# Patient Record
Sex: FEMALE | Race: WHITE | NOT HISPANIC OR LATINO | Employment: OTHER | URBAN - METROPOLITAN AREA
[De-identification: names, ages, dates, MRNs, and addresses within clinical notes are randomized per-mention and may not be internally consistent; named-entity substitution may affect disease eponyms.]

---

## 2017-07-07 ENCOUNTER — HOSPITAL ENCOUNTER (OUTPATIENT)
Dept: MAMMOGRAPHY | Facility: CLINIC | Age: 62
Discharge: HOME | End: 2017-07-07
Payer: COMMERCIAL

## 2017-07-07 DIAGNOSIS — Z12.31 ENCOUNTER FOR SCREENING MAMMOGRAM FOR MALIGNANT NEOPLASM OF BREAST: ICD-10-CM

## 2017-07-07 PROCEDURE — G0202 SCR MAMMO BI INCL CAD: HCPCS

## 2017-07-07 PROCEDURE — 77063 BREAST TOMOSYNTHESIS BI: CPT

## 2018-06-06 ENCOUNTER — ANNUAL EXAM (OUTPATIENT)
Dept: OBGYN CLINIC | Age: 63
End: 2018-06-06
Payer: MEDICARE

## 2018-06-06 VITALS
HEIGHT: 65 IN | DIASTOLIC BLOOD PRESSURE: 94 MMHG | SYSTOLIC BLOOD PRESSURE: 134 MMHG | WEIGHT: 168 LBS | BODY MASS INDEX: 27.99 KG/M2

## 2018-06-06 DIAGNOSIS — Z01.419 ENCOUNTER FOR GYNECOLOGICAL EXAMINATION WITHOUT ABNORMAL FINDING: Primary | ICD-10-CM

## 2018-06-06 DIAGNOSIS — Z78.0 POSTMENOPAUSAL: ICD-10-CM

## 2018-06-06 DIAGNOSIS — Z12.31 ENCOUNTER FOR SCREENING MAMMOGRAM FOR MALIGNANT NEOPLASM OF BREAST: ICD-10-CM

## 2018-06-06 DIAGNOSIS — Z82.62 FAMILY HISTORY OF DISUSE OSTEOPOROSIS: ICD-10-CM

## 2018-06-06 PROCEDURE — G0101 CA SCREEN;PELVIC/BREAST EXAM: HCPCS | Performed by: OBSTETRICS & GYNECOLOGY

## 2018-06-06 RX ORDER — CHLORAL HYDRATE 500 MG
1000 CAPSULE ORAL
COMMUNITY

## 2018-06-06 RX ORDER — FEXOFENADINE HYDROCHLORIDE 60 MG/1
TABLET, FILM COATED ORAL
COMMUNITY

## 2018-06-06 NOTE — PROGRESS NOTES
Assessment/Plan:    Encounter for gynecological examination without abnormal finding  PapHPV current, no longer indicated  Mammogram ordered  Colonoscopy current  DEXA ordered  Encourage healthy diet, exercise, Calcium 1200mg per day and at least 800 iu Vitamin D daily  Some pelvic floor weakness noted on exam  Discussed strengthening exercises  Diagnoses and all orders for this visit:    Encounter for gynecological examination without abnormal finding    Encounter for screening mammogram for malignant neoplasm of breast  -     Mammo screening bilateral w 3d & cad; Future    Postmenopausal  -     DXA bone density spine hip and pelvis; Future    Family history of disuse osteoporosis  -     DXA bone density spine hip and pelvis; Future    Other orders  -     Aspirin 81 MG EC tablet; Take by mouth  -     fexofenadine (ALLEGRA) 60 MG tablet; Take by mouth  -     Cyanocobalamin (VITAMIN B 12) 100 MCG LOZG; Take by mouth  -     NOVOLOG 100 UNIT/ML injection;   -     Insulin Infusion Pump KIT; by Does not apply route  -     Omega-3 Fatty Acids (FISH OIL) 1,000 mg; Take 1,000 mg by mouth          Subjective:      Patient ID: Faby Omer is a 70 y o  female  Patient here for yearly exam   Age of first period 14yrs old     lmp: patient is   Last pap: 9/14/15 neg HPV neg  Last mammo: 17 BR2 neg (3D)  Colonoscopy:  (due )  Patient is not a smoker  Patient is not a drinker  Patient tries to exercise  NOted some increase abdominal/pelvic pressure after this past weekend with her grandchildren  No changes in bowel or bladder  Exercises daily- cardio some weight training-upper body,legs      Gynecologic Exam   The patient's pertinent negatives include no genital itching, genital lesions, genital odor, genital rash, pelvic pain, vaginal bleeding or vaginal discharge  The patient is experiencing no pain  Associated symptoms include constipation   Pertinent negatives include no chills, diarrhea, fever, frequency, nausea, sore throat, urgency or vomiting  She is sexually active  She is postmenopausal        The following portions of the patient's history were reviewed and updated as appropriate:   She  has no past medical history on file  She   Patient Active Problem List    Diagnosis Date Noted    Encounter for gynecological examination without abnormal finding 06/06/2018    Type I diabetes mellitus, uncontrolled ([facility] ) 06/13/2012    DVT of upper extremity (deep vein thrombosis) ([facility] ) 04/03/2012     She  has no past surgical history on file  Her family history is not on file  She  reports that she has never smoked  She has never used smokeless tobacco  She reports that she does not drink alcohol or use drugs  Current Outpatient Prescriptions   Medication Sig Dispense Refill    Aspirin 81 MG EC tablet Take by mouth      Cyanocobalamin (VITAMIN B 12) 100 MCG LOZG Take by mouth      fexofenadine (ALLEGRA) 60 MG tablet Take by mouth      Insulin Infusion Pump KIT by Does not apply route      NOVOLOG 100 UNIT/ML injection       Omega-3 Fatty Acids (FISH OIL) 1,000 mg Take 1,000 mg by mouth       No current facility-administered medications for this visit  No current outpatient prescriptions on file prior to visit  No current facility-administered medications on file prior to visit  She is allergic to penicillins; sulfa antibiotics; and ampicillin       Review of Systems   Constitutional: Negative for activity change, appetite change, chills, fatigue and fever  HENT: Negative for rhinorrhea, sneezing and sore throat  Eyes: Negative for visual disturbance  Respiratory: Negative for cough, shortness of breath and wheezing  Cardiovascular: Negative for chest pain, palpitations and leg swelling  Gastrointestinal: Positive for constipation  Negative for abdominal distention, diarrhea, nausea and vomiting     Genitourinary: Negative for difficulty urinating, frequency, pelvic pain, urgency and vaginal discharge  Neurological: Negative for syncope and light-headedness  Objective:      /94 (BP Location: Right arm, Patient Position: Sitting, Cuff Size: Standard)   Ht 5' 4 8" (1 646 m)   Wt 76 2 kg (168 lb)   LMP  (LMP Unknown)   BMI 28 13 kg/m²          Physical Exam   Constitutional: She is oriented to person, place, and time  Genitourinary: Vagina normal and uterus normal  No breast swelling, tenderness, discharge or bleeding  There is no rash, tenderness, lesion or injury on the right labia  There is no rash, tenderness, lesion or injury on the left labia  Uterus is not deviated, not enlarged, not fixed and not tender  Cervix exhibits no motion tenderness, no discharge and no friability  Right adnexum displays no mass, no tenderness and no fullness  Left adnexum displays no mass, no tenderness and no fullness  No tenderness or bleeding in the vagina  No vaginal discharge found  Genitourinary Comments: Lax vaginal walls but mild prolapse  cystocele    Abdominal wall is firm from scar tissue from small bowel removal/fistula repair   Neurological: She is alert and oriented to person, place, and time

## 2018-06-06 NOTE — ASSESSMENT & PLAN NOTE
PapHPV current, no longer indicated  Mammogram ordered  Colonoscopy current  DEXA ordered  Encourage healthy diet, exercise, Calcium 1200mg per day and at least 800 iu Vitamin D daily  Some pelvic floor weakness noted on exam  Discussed strengthening exercises

## 2018-06-06 NOTE — PATIENT INSTRUCTIONS
Core Strengthening Exercises   WHAT YOU NEED TO KNOW:   What do I need to know about core strengthening exercises? Your core includes the muscles of your lower back, hip, pelvis, and abdomen  Core strengthening exercises help heal and strengthen these muscles  This helps prevent another injury, and keeps your pelvis, spine, and hips in the correct position  What do I need to know about exercise safety? Talk to your healthcare provider before you start an exercise program  A physical therapist can teach you how to do core strengthening exercises safely  · Do the exercises on a mat or firm surface  A firm surface will support your spine and avoid low back pain  Do not do these exercises on a bed  · Move slowly and smoothly  Avoid fast or jerky motions  · Stop if you feel pain  Core exercises should not feel painful  Stop if you feel pain  · Breathe normally during core exercises  Do not hold your breath  This may cause an increase in blood pressure and prevent muscle strengthening  Your healthcare provider will tell you when to inhale and exhale during the exercise  · Begin all of your exercises with abdominal bracing  Abdominal bracing helps warm up your core muscles  You can also practice abdominal bracing throughout the day while you are sitting or standing  Lie on your back with your knees bent and feet flat on the floor  Place your arms in a relaxed position beside your body  Tighten your abdominal muscles  Pull your belly button in and up toward your spine  Hold for 5 seconds  Relax your muscles  Repeat 10 times  How do I perform core strengthening exercises? Your healthcare provider will tell you how often to do these exercises  The provider will also tell you how many repetitions of each exercise you should do  Hold each exercise for 5 seconds or as directed  As you get stronger, increase your hold to 10 to 15 seconds   You can do some of these exercises on a stability ball, or with a weight  Ask your healthcare provider how to use a stability ball or weight for these exercises:  · Bent leg side bridge:  Lie on one side with your legs, hips, and shoulders in a straight line  Prop yourself up onto your forearm so your elbow is directly below your shoulder  Bend your knees to 90°  Lift your hips off the floor  Balance yourself on your forearm and the side of your knee  Hold this position  Repeat on the other side  · Straight leg side bridge:  Lie on one side with your legs, hips, and shoulders in a straight line  Prop yourself up onto your forearm so your elbow is directly below your shoulder  Your top leg can rest in front of your bottom leg for support  Lift your hips off the floor  Balance yourself on your forearm and the outside of your flexed foot  Do not let your ankle bend sideways  Hold this position  Repeat on the other side  · Superman:  Lie on your stomach  Extend your arms forward on the floor  Tighten your abdominal muscles and lift your right hand and left leg off the floor  Hold this position  Slowly return to the starting position  Tighten your abdominal muscles and lift your left hand and right leg off the floor  Hold this position  Slowly return to the starting position  · Curl up:  Lie on your back with your knees bent and feet flat on the floor  Place your hands, palms down, underneath your lower back  Next, with your elbows on the floor, lift your shoulders and chest 2 to 3 inches off the floor  Keep your head in line with your shoulders  Hold this position  Slowly return to the starting position  · Straight leg raises:  Lie on your back with one leg straight  Bend the other knee and place your foot flat on the floor  Tighten your abdominal muscles  Keep your leg straight and slowly lift it straight up 6 to 12 inches off the floor  Hold this position  Lower your leg slowly  Do as many repetitions as directed on this side   Repeat with the other leg  · Plank:  Lie on your stomach  Bend your elbows and place your forearms flat on the floor  Lift your chest, stomach, and knees off the floor  Make sure your elbows are below your shoulders  Your body should be in a straight line  Do not let your hips or lower back sink to the ground  Squeeze your abdominal muscles together and hold for 15 seconds  To make this exercise harder, hold for 30 seconds or lift 1 leg at a time  · Bicycles:  Lie on your back  Bend both knees and bring them toward your chest  Your calves should be parallel to the floor  Place the palms of your hands on the back of your head  Straighten your right leg and keep it lifted 2 inches off the floor  Raise your head and shoulders off the floor and twist towards your left  Keep your head and shoulders lifted  Bend your right knee while you straighten your left leg  Keep your left leg 2 inches off the floor  Twist your head and chest towards the left leg  Continue to straighten 1 leg at a time and twist        When should I contact my healthcare provider? · You have sharp or worsening pain during exercise or at rest     · You have questions or concerns about your condition, care, or exercise program   CARE AGREEMENT:   You have the right to help plan your care  Learn about your health condition and how it may be treated  Discuss treatment options with your caregivers to decide what care you want to receive  You always have the right to refuse treatment  The above information is an  only  It is not intended as medical advice for individual conditions or treatments  Talk to your doctor, nurse or pharmacist before following any medical regimen to see if it is safe and effective for you  © 2017 [employer] Information is for End User's use only and may not be sold, redistributed or otherwise used for commercial purposes   All illustrations and images included in CareNotes® are the copyrighted property of [employer]  or [employer]  Wellness Visit for Adults   WHAT YOU NEED TO KNOW:   What is a wellness visit? A wellness visit is when you see your healthcare provider to get screened for health problems  You can also get advice on how to stay healthy  Write down your questions so you remember to ask them  Ask your healthcare provider how often you should have a wellness visit  What happens at a wellness visit? Your healthcare provider will ask about your health, and your family history of health problems  This includes high blood pressure, heart disease, and cancer  He or she will ask if you have symptoms that concern you, if you smoke, and about your mood  You may also be asked about your intake of medicines, supplements, food, and alcohol  Any of the following may be done:  · Your weight  will be checked  Your height may also be checked so your body mass index (BMI) can be calculated  Your BMI shows if you are at a healthy weight  · Your blood pressure  and heart rate will be checked  Your temperature may also be checked  · Blood and urine tests  may be done  Blood tests may be done to check your cholesterol levels  Abnormal cholesterol levels increase your risk for heart disease and stroke  You may also need a blood or urine test to check for diabetes if you are at increased risk  Urine tests may be done to look for signs of an infection or kidney disease  · A physical exam  includes checking your heartbeat and lungs with a stethoscope  Your healthcare provider may also check your skin to look for sun damage  · Screening tests  may be recommended  A screening test is done to check for diseases that may not cause symptoms  The screening tests you may need depend on your age, gender, family history, and lifestyle habits  For example, colorectal screening may be recommended if you are 48years old or older  What screening tests do I need if I am a woman? · A Pap smear  is used to screen for cervical cancer  Pap smears are usually done every 3 to 5 years depending on your age  You may need them more often if you have had abnormal Pap smear test results in the past  Ask your healthcare provider how often you should have a Pap smear  · A mammogram  is an x-ray of your breasts to screen for breast cancer  Experts recommend mammograms every 2 years starting at age 48 years  You may need a mammogram at age 52 years or younger if you have an increased risk for breast cancer  Talk to your healthcare provider about when you should start having mammograms and how often you need them  What vaccines might I need? · Get an influenza vaccine  every year  The influenza vaccine protects you from the flu  Several types of viruses cause the flu  The viruses change over time, so new vaccines are made each year  · Get a tetanus-diphtheria (Td) booster vaccine  every 10 years  This vaccine protects you against tetanus and diphtheria  Tetanus is a severe infection that may cause painful muscle spasms and lockjaw  Diphtheria is a severe bacterial infection that causes a thick covering in the back of your mouth and throat  · Get a human papillomavirus (HPV) vaccine  if you are female and aged 23 to 32 or male 23 to 24 and never received it  This vaccine protects you from HPV infection  HPV is the most common infection spread by sexual contact  HPV may also cause vaginal, penile, and anal cancers  · Get a pneumococcal vaccine  if you are aged 72 years or older  The pneumococcal vaccine is an injection given to protect you from pneumococcal disease  Pneumococcal disease is an infection caused by pneumococcal bacteria  The infection may cause pneumonia, meningitis, or an ear infection  · Get a shingles vaccine  if you are aged 61 or older, even if you have had shingles before  The shingles vaccine is an injection to protect you from the varicella-zoster virus   This is the same virus that causes chickenpox  Shingles is a painful rash that develops in people who had chickenpox or have been exposed to the virus  How can I eat healthy? My Plate is a model for planning healthy meals  It shows the types and amounts of foods that should go on your plate  Fruits and vegetables make up about half of your plate, and grains and protein make up the other half  A serving of dairy is included on the side of your plate  The amount of calories and serving sizes you need depends on your age, gender, weight, and height  Examples of healthy foods are listed below:  · Eat a variety of vegetables  such as dark green, red, and orange vegetables  You can also include canned vegetables low in sodium (salt) and frozen vegetables without added butter or sauces  · Eat a variety of fresh fruits , canned fruit in 100% juice, frozen fruit, and dried fruit  · Include whole grains  At least half of the grains you eat should be whole grains  Examples include whole-wheat bread, wheat pasta, brown rice, and whole-grain cereals such as oatmeal     · Eat a variety of protein foods such as seafood (fish and shellfish), lean meat, and poultry without skin (turkey and chicken)  Examples of lean meats include pork leg, shoulder, or tenderloin, and beef round, sirloin, tenderloin, and extra lean ground beef  Other protein foods include eggs and egg substitutes, beans, peas, soy products, nuts, and seeds  · Choose low-fat dairy products such as skim or 1% milk or low-fat yogurt, cheese, and cottage cheese  · Limit unhealthy fats  such as butter, hard margarine, and shortening  How much exercise do I need? Exercise at least 30 minutes per day on most days of the week  Some examples of exercise include walking, biking, dancing, and swimming  You can also fit in more physical activity by taking the stairs instead of the elevator or parking farther away from stores   Include muscle strengthening activities 2 days each week  Regular exercise provides many health benefits  It helps you manage your weight, and decreases your risk for type 2 diabetes, heart disease, stroke, and high blood pressure  Exercise can also help improve your mood  Ask your healthcare provider about the best exercise plan for you  What are some general health and safety guidelines I should follow? · Do not smoke  Nicotine and other chemicals in cigarettes and cigars can cause lung damage  Ask your healthcare provider for information if you currently smoke and need help to quit  E-cigarettes or smokeless tobacco still contain nicotine  Talk to your healthcare provider before you use these products  · Limit alcohol  A drink of alcohol is 12 ounces of beer, 5 ounces of wine, or 1½ ounces of liquor  · Lose weight, if needed  Being overweight increases your risk of certain health conditions  These include heart disease, high blood pressure, type 2 diabetes, and certain types of cancer  · Protect your skin  Do not sunbathe or use tanning beds  Use sunscreen with a SPF 15 or higher  Apply sunscreen at least 15 minutes before you go outside  Reapply sunscreen every 2 hours  Wear protective clothing, hats, and sunglasses when you are outside  · Drive safely  Always wear your seatbelt  Make sure everyone in your car wears a seatbelt  A seatbelt can save your life if you are in an accident  Do not use your cell phone when you are driving  This could distract you and cause an accident  Pull over if you need to make a call or send a text message  · Practice safe sex  Use latex condoms if are sexually active and have more than one partner  Your healthcare provider may recommend screening tests for sexually transmitted infections (STIs)  · Wear helmets, lifejackets, and protective gear  Always wear a helmet when you ride a bike or motorcycle, go skiing, or play sports that could cause a head injury   Wear protective equipment when you play sports  Wear a lifejacket when you are on a boat or doing water sports  CARE AGREEMENT:   You have the right to help plan your care  Learn about your health condition and how it may be treated  Discuss treatment options with your caregivers to decide what care you want to receive  You always have the right to refuse treatment  The above information is an  only  It is not intended as medical advice for individual conditions or treatments  Talk to your doctor, nurse or pharmacist before following any medical regimen to see if it is safe and effective for you  © 2017 [employer] Information is for End User's use only and may not be sold, redistributed or otherwise used for commercial purposes  All illustrations and images included in CareNotes® are the copyrighted property of [employer]  or [employer]

## 2018-10-11 ENCOUNTER — HOSPITAL ENCOUNTER (OUTPATIENT)
Dept: MAMMOGRAPHY | Facility: CLINIC | Age: 63
Discharge: HOME | End: 2018-10-11
Payer: MEDICARE

## 2018-10-11 DIAGNOSIS — Z82.62 FAMILY HISTORY OF DISUSE OSTEOPOROSIS: ICD-10-CM

## 2018-10-11 DIAGNOSIS — Z78.0 POSTMENOPAUSAL: ICD-10-CM

## 2018-10-11 DIAGNOSIS — Z12.31 ENCOUNTER FOR SCREENING MAMMOGRAM FOR MALIGNANT NEOPLASM OF BREAST: ICD-10-CM

## 2018-10-11 PROCEDURE — 77067 SCR MAMMO BI INCL CAD: CPT

## 2018-10-11 PROCEDURE — 77063 BREAST TOMOSYNTHESIS BI: CPT

## 2018-10-11 PROCEDURE — 77080 DXA BONE DENSITY AXIAL: CPT

## 2018-10-15 ENCOUNTER — TELEPHONE (OUTPATIENT)
Dept: OBGYN CLINIC | Age: 63
End: 2018-10-15

## 2018-10-15 NOTE — TELEPHONE ENCOUNTER
Called kamran's home to advise of normal bone density results, also to encourage healthy diet, exercise, calcium 1200mg per day and at least 800 iu vitamin D daily  Unable to physically speak with patient so left detailed voicemail advising patient  Also advised if any further questions or concerns to give our office a call back  [place] office number provided

## 2018-10-15 NOTE — TELEPHONE ENCOUNTER
----- Message from Kasi Causey MD sent at 10/15/2018  3:58 PM EDT -----  Please call Natalio Mary - her DEXA shows normal bone density  Encourage healthy diet, exercise, Calcium 1200mg per day and at least 800 iu Vitamin D daily

## 2019-12-06 ENCOUNTER — TRANSCRIBE ORDERS (OUTPATIENT)
Dept: ADMINISTRATIVE | Facility: HOSPITAL | Age: 64
End: 2019-12-06

## 2019-12-06 DIAGNOSIS — Z12.31 SCREENING MAMMOGRAM FOR HIGH-RISK PATIENT: Primary | ICD-10-CM

## 2019-12-12 ENCOUNTER — HOSPITAL ENCOUNTER (OUTPATIENT)
Dept: MAMMOGRAPHY | Facility: CLINIC | Age: 64
Discharge: HOME | End: 2019-12-12
Payer: MEDICARE

## 2019-12-12 VITALS — WEIGHT: 168 LBS | BODY MASS INDEX: 27.99 KG/M2 | HEIGHT: 65 IN

## 2019-12-12 DIAGNOSIS — Z12.31 SCREENING MAMMOGRAM FOR HIGH-RISK PATIENT: ICD-10-CM

## 2019-12-12 PROCEDURE — 77063 BREAST TOMOSYNTHESIS BI: CPT

## 2019-12-12 PROCEDURE — 77067 SCR MAMMO BI INCL CAD: CPT

## 2019-12-19 ENCOUNTER — HOSPITAL ENCOUNTER (OUTPATIENT)
Dept: ULTRASOUND IMAGING | Facility: CLINIC | Age: 64
Discharge: HOME | End: 2019-12-19
Payer: MEDICARE

## 2019-12-19 ENCOUNTER — HOSPITAL ENCOUNTER (OUTPATIENT)
Dept: MAMMOGRAPHY | Facility: CLINIC | Age: 64
Discharge: HOME | End: 2019-12-19
Payer: MEDICARE

## 2019-12-19 DIAGNOSIS — R92.8 ABNORMAL MAMMOGRAM: ICD-10-CM

## 2019-12-19 PROCEDURE — 77065 DX MAMMO INCL CAD UNI: CPT

## 2019-12-19 PROCEDURE — 76642 ULTRASOUND BREAST LIMITED: CPT

## 2019-12-19 PROCEDURE — G0279 TOMOSYNTHESIS, MAMMO: HCPCS

## 2019-12-19 RX ORDER — DOCUSATE SODIUM 100 MG/1
100 CAPSULE, LIQUID FILLED ORAL 2 TIMES DAILY
COMMUNITY
End: 2020-10-28 | Stop reason: ALTCHOICE

## 2019-12-19 NOTE — PROGRESS NOTES
Met with patient and Dr Reynoso Guardian                regarding recommendation for;      _____ RIGHT __x(2)____LEFT      __x___Ultrasound guided  ______Stereotactic  Breast biopsy  __x___Verbalized understanding        Blood thinners:  _____yes __x___no    Date stopped: ____n/a_______    Biopsy teaching sheet given:  ____x___yes ______no

## 2019-12-23 ENCOUNTER — HOSPITAL ENCOUNTER (OUTPATIENT)
Dept: MAMMOGRAPHY | Facility: CLINIC | Age: 64
Discharge: HOME | End: 2019-12-23

## 2019-12-23 ENCOUNTER — HOSPITAL ENCOUNTER (OUTPATIENT)
Dept: ULTRASOUND IMAGING | Facility: CLINIC | Age: 64
Discharge: HOME | End: 2019-12-23
Admitting: RADIOLOGY
Payer: MEDICARE

## 2019-12-23 VITALS — SYSTOLIC BLOOD PRESSURE: 168 MMHG | DIASTOLIC BLOOD PRESSURE: 87 MMHG | HEART RATE: 70 BPM

## 2019-12-23 DIAGNOSIS — R92.8 ABNORMAL MAMMOGRAM: ICD-10-CM

## 2019-12-23 DIAGNOSIS — R92.8 ABNORMAL ULTRASOUND OF BREAST: ICD-10-CM

## 2019-12-23 PROCEDURE — 88361 TUMOR IMMUNOHISTOCHEM/COMPUT: CPT | Performed by: PATHOLOGY

## 2019-12-23 PROCEDURE — 19083 BX BREAST 1ST LESION US IMAG: CPT

## 2019-12-23 PROCEDURE — 88341 IMHCHEM/IMCYTCHM EA ADD ANTB: CPT | Performed by: PATHOLOGY

## 2019-12-23 PROCEDURE — 88305 TISSUE EXAM BY PATHOLOGIST: CPT | Performed by: PATHOLOGY

## 2019-12-23 PROCEDURE — 88342 IMHCHEM/IMCYTCHM 1ST ANTB: CPT | Performed by: PATHOLOGY

## 2019-12-23 RX ORDER — LIDOCAINE HYDROCHLORIDE 10 MG/ML
5 INJECTION, SOLUTION INFILTRATION; PERINEURAL ONCE
Status: COMPLETED | OUTPATIENT
Start: 2019-12-23 | End: 2019-12-23

## 2019-12-23 RX ADMIN — LIDOCAINE HYDROCHLORIDE 5 ML: 10 INJECTION, SOLUTION INFILTRATION; PERINEURAL at 09:24

## 2019-12-23 RX ADMIN — LIDOCAINE HYDROCHLORIDE 5 ML: 10 INJECTION, SOLUTION INFILTRATION; PERINEURAL at 09:25

## 2019-12-23 NOTE — PROGRESS NOTES
Procedure type:    ___x__ultrasound guided _____stereotactic    Breast:    __x___Left _____Right    Location: 2 o'clock 10 CMFn    Needle: 12 Gauge jimena    # of passes: 5    Clip: Tophat    Performed by: Dr Ashly Suazo held for 5 minutes by: Amira Engel Strips:    ___x__yes _____no    Band aid:    __x___yes_____no    Tape and guaze:    ___x__yes _____no    Tolerated procedure:    ___x__yes _____no

## 2019-12-23 NOTE — PROGRESS NOTES
Ice pack given:    ___x__yes _____no    Discharge instructions signed by patient:    ___x__yes _____no    Discharge instructions given to patient:    __x___yes _____no    Discharged via:    __x___amulatory    _____wheelchair    _____stretcher    Stable on discharge:    ___x__yes ____no  Patient would like results over the phone

## 2019-12-23 NOTE — DISCHARGE INSTR - OTHER ORDERS
POST LARGE CORE BREAST BIOPSY PATIENT INFORMATION      1  Place an ice pack inside your bra over the top of the dressing every hour for 20 minutes (20 minutes on, 60 minutes off)  Do this until bedtime  2  Do not shower or bathe until the following morning  3  You may bathe your breast carefully with the steri-strips in place  Be careful    Not to loosen them  The steri-strips will fall off in 3-5 days  4  You may have mild discomfort, and you may have some bruising where the   Needle entered the skin  This should clear within 5-7 days  5  If you need medicine for discomfort, take acetaminophen products such as   Tylenol  You may also take Advil or Motrin products  6  Do not participate in strenuous activities such as-tennis, aerobics, skiing,  Weight lifting, etc  for 24 hours  Refrain from swimming/soaking for 72 hours  7  Wearing a bra for sleeping may be more comfortable for the first 24-48 hours  8  Watch for continued bleeding, pain or fever over 101; please call with any questions or concerns  For procedures done at the [facility] call:  Karlene Nageotte RN at [phone]  Floyd Mustafa RN at [phone]                    *After 4 PM call the Interventional Radiology Department                    [phone] and ask to speak with the nurse on call  For procedures done at the [address] [place] [address] call:         Venice James RN at   *After 4 PM call the Interventional Radiology Department   [phone] and ask to speak with the nurse on call  For procedures done at [address] [place] [address] call: The Radiology Nurse at [phone]  *After 4 PM call your physician, or go to the Emergency Department  9          The final results of your biopsy are usually available within one week

## 2019-12-24 NOTE — PROGRESS NOTES
Post procedure call completed    Bleeding: _____yes __X___no    Pain: _____yes ___X___no (Pt denies breast pain, pt states she is sore in shoulder and ribs from positioning, used ice and took ibuprofen)    Redness/Swelling: ______yes ___X___no (Pt with sm amt of bruising)    Band aid removed: __X___yes _____no    Steri-Strips intact: ___X___yes _____no    Pt with no questions at this time, adv will call when results available, adv to call with any questions or concerns, has name/# for contact

## 2019-12-26 ENCOUNTER — TELEPHONE (OUTPATIENT)
Dept: MAMMOGRAPHY | Facility: CLINIC | Age: 64
End: 2019-12-26

## 2019-12-26 ENCOUNTER — TELEPHONE (OUTPATIENT)
Dept: HEMATOLOGY ONCOLOGY | Facility: CLINIC | Age: 64
End: 2019-12-26

## 2019-12-26 NOTE — TELEPHONE ENCOUNTER
Call made to patient, adv appt made with Dr Paradise Hoffmann's office on Thursday January 16th at 56, arrive by [phone], directions given to office, adv new patient paperwork would be mailed to her for completion prior to arrival, pt with no questions at this time, pt has name/# for contact, adv to contact me anytime for questions/concerns/help

## 2019-12-26 NOTE — TELEPHONE ENCOUNTER
New Patient Encounter    New Patient Intake Form   Patient Details:  Kathleen Jules  1955  [id number]    Background Information:  [address] starts by opening a telephone encounter and gathering the following information   Who is calling to schedule? If not self, relationship to patient? Christine   Referring Provider RBC   What is the diagnosis? Left invasive ductal carcinoma   Is there any prior history of Cancer? No   If yes, please explain n/a   When was the diagnosis? 12/2019   Is patient aware of diagnosis? Yes   Reason for visit? NP DX   Have you had any testing done? If so: when, where? Yes   Are records in [place]? yes   Was the patient told to bring a disk? no   Scheduling Information:   Preferred [place]: [facility]     Requesting Specific Provider? Dr Coco Carlson   Are there any dates/time the patient cannot be seen? Would like an appt between 10am and 1pm      Miscellaneous:    After completing the above information, please route to Financial Counselor and the appropriate Nurse Navigator for review

## 2019-12-26 NOTE — TELEPHONE ENCOUNTER
Call placed to patient after pt given biopsy results from radiologist, Dr Waqas Harris, questions answered, adv next step is to set patient up with surgeon, options discussed and patient wanted to have appt made with Dr Cristiane Carlson, determined pt availability (would like appointment between 10a-1p at the [facility]) and adv pt I would make appt for her and call her back

## 2020-01-14 PROBLEM — Z17.0 MALIGNANT NEOPLASM OF UPPER-OUTER QUADRANT OF LEFT BREAST IN FEMALE, ESTROGEN RECEPTOR POSITIVE (HCC): Status: ACTIVE | Noted: 2020-01-14

## 2020-01-14 PROBLEM — C50.412 MALIGNANT NEOPLASM OF UPPER-OUTER QUADRANT OF LEFT BREAST IN FEMALE, ESTROGEN RECEPTOR POSITIVE (HCC): Status: ACTIVE | Noted: 2020-01-14

## 2020-01-15 NOTE — TELEPHONE ENCOUNTER
Pt has active medicare part A & B effective 01/01/18  Called [employer] & spoke to Carlos Zarate call ref# [id number]  This is an active supplemental plan that is effective 01/01/18  This plan follows medicare guidelines  If medicare pays they pay  There will be no pt responsibility   No need for me to call the pt

## 2020-01-16 ENCOUNTER — DOCUMENTATION (OUTPATIENT)
Dept: HEMATOLOGY ONCOLOGY | Facility: CLINIC | Age: 65
End: 2020-01-16

## 2020-01-16 ENCOUNTER — TELEPHONE (OUTPATIENT)
Dept: SURGICAL ONCOLOGY | Facility: CLINIC | Age: 65
End: 2020-01-16

## 2020-01-16 ENCOUNTER — CONSULT (OUTPATIENT)
Dept: SURGICAL ONCOLOGY | Facility: CLINIC | Age: 65
End: 2020-01-16
Payer: MEDICARE

## 2020-01-16 VITALS
BODY MASS INDEX: 28.49 KG/M2 | HEIGHT: 65 IN | WEIGHT: 171 LBS | DIASTOLIC BLOOD PRESSURE: 104 MMHG | SYSTOLIC BLOOD PRESSURE: 204 MMHG | HEART RATE: 80 BPM | TEMPERATURE: 98.1 F | RESPIRATION RATE: 16 BRPM

## 2020-01-16 DIAGNOSIS — Z17.0 MALIGNANT NEOPLASM OF UPPER-OUTER QUADRANT OF LEFT BREAST IN FEMALE, ESTROGEN RECEPTOR POSITIVE (HCC): Primary | ICD-10-CM

## 2020-01-16 DIAGNOSIS — Z80.3 FAMILY HISTORY OF BREAST CANCER: ICD-10-CM

## 2020-01-16 DIAGNOSIS — C50.412 MALIGNANT NEOPLASM OF UPPER-OUTER QUADRANT OF LEFT BREAST IN FEMALE, ESTROGEN RECEPTOR POSITIVE (HCC): Primary | ICD-10-CM

## 2020-01-16 PROCEDURE — 99205 OFFICE O/P NEW HI 60 MIN: CPT | Performed by: SURGERY

## 2020-01-16 NOTE — PROGRESS NOTES
BCN Navigator:  Met with patient and her spouse after MD visit  Introduced myself, & explained role of nurse navigator  Offered therapeutic listening & emotional support  Answered all questions to their satisfaction  Informed and provided written material pertaining to cancer support services available, including: Cancer counselors, LAFF program, P/T, Nutrition and treatment classes, Nick Archer and My Chart  Patient was given the patient guidebook and my contact information  I encouraged her to call for questions/concerns  Patient escorted to surgery scheduler  Will follow up as needed

## 2020-01-16 NOTE — PROGRESS NOTES
Surgical Oncology Consult Note       [address] [facility]  [facility] [place]  [address] [facility] [address]  [place] [address]    Pagan Japanese  1955  [id number]  [address]  [facility] [place]  [address] [facility] [place] [address]      Chief Complaint:     Chief Complaint   Patient presents with    Consult    Breast Cancer     New Diagnosis       Assessment and Plan:   Assessment/Plan   The patient presents with a new diagnosis of left breast invasive carcinoma  There may be a 2nd site  I have reviewed this with Dr Vincent Franz  We have recommended an MRI  The patient also has a family history of breast cancer and we recommended genetic testing which will try to coordinate today  I will see her back following these studies  Oncology History:        Malignant neoplasm of upper-outer quadrant of left breast in female, estrogen receptor positive ([facility] [address] )    12/23/2019 Biopsy     Left breast ultrasound-guided biopsy  2 o'clock, 10 cm from nipple  Invasive mammary carcinoma of no special type  Grade 1    OK 90  HER2 1+    Concordant  Appears unifocal - difficulty assessing accurate size on mammo vs US  2 1 cm vs 1 1 cm; possible concern for adjacent mass on original US not able to be visualized on biopsy  Left axilla US negative  Right breast clear  History of Present Illness: This is a 49-year-old woman who went for a screening mammogram which demonstrated stable calcifications on the left  There are no abnormalities on the right side  There is a new mass at the 2 o'clock position 10 cm from the nipple  The patient subsequent had diagnostic mammogram and ultrasound which suggested that there was a 2nd lesion approximately a cm and half away  Both of these were recommended to be biopsied however at the time of biopsy only the initial lesion could be seen and it was biopsied and anti Pat clip was placed    The pathology returned invasive ductal carcinoma grade 1 %, AZ 90% HER2 1+/negative  Evaluation of the axilla with ultrasound showed no evidence of adenopathy  The patient does have a family history of her mother having breast cancer in her 45s as well as a sister in her 62s  The patient presents now for an opinion regarding further management  Review of Systems:   Review of Systems   All other systems reviewed and are negative  Past Medical History:      Patient Active Problem List   Diagnosis    DVT of upper extremity (deep vein thrombosis) (HCC)    Type I diabetes mellitus, uncontrolled ([facility] [address] )    Encounter for gynecological examination without abnormal finding    Malignant neoplasm of upper-outer quadrant of left breast in female, estrogen receptor positive ([facility] [address] )      History reviewed  No pertinent past medical history       Past Surgical History:   Procedure Laterality Date    BREAST BIOPSY Left 2009    neg    US GUIDED BREAST BIOPSY LEFT COMPLETE Left 12/23/2019        Family History   Problem Relation Age of Onset    Breast cancer Mother 43    Cervical cancer Mother 58    Breast cancer Sister 61    No Known Problems Maternal Grandmother     No Known Problems Paternal Grandmother     No Known Problems Maternal Aunt     Bone cancer Paternal Aunt 43    No Known Problems Paternal Aunt     Cancer Father         Bladder        Social History     Socioeconomic History    Marital status: /Civil Union     Spouse name: Not on file    Number of children: Not on file    Years of education: Not on file    Highest education level: Not on file   Occupational History    Not on file   Social Needs    Financial resource strain: Not on file    Food insecurity:     Worry: Not on file     Inability: Not on file    Transportation needs:     Medical: Not on file     Non-medical: Not on file   Tobacco Use    Smoking status: Never Smoker    Smokeless tobacco: Never Used   Substance and Sexual Activity    Alcohol use: No    Drug use: No    Sexual activity: Not Currently     Birth control/protection: Post-menopausal   Lifestyle    Physical activity:     Days per week: Not on file     Minutes per session: Not on file    Stress: Not on file   Relationships    Social connections:     Talks on phone: Not on file     Gets together: Not on file     Attends [employer] service: Not on file     Active member of club or organization: Not on file     Attends meetings of clubs or organizations: Not on file     Relationship status: Not on file    Intimate partner violence:     Fear of current or ex partner: Not on file     Emotionally abused: Not on file     Physically abused: Not on file     Forced sexual activity: Not on file   Other Topics Concern    Not on file   Social History Narrative    Not on file        Current Outpatient Medications:     Aspirin 81 MG EC tablet, Take by mouth, Disp: , Rfl:     Cholecalciferol (VITAMIN D3 PO), Take by mouth, Disp: , Rfl:     Cyanocobalamin (VITAMIN B 12) 100 MCG LOZG, Take by mouth, Disp: , Rfl:     docusate sodium (COLACE) 100 mg capsule, Take 100 mg by mouth 2 (two) times a day, Disp: , Rfl:     fexofenadine (ALLEGRA) 60 MG tablet, Take by mouth, Disp: , Rfl:     hydrocortisone 0 5 % cream, Apply topically 2 (two) times a day, Disp: , Rfl:     Insulin Infusion Pump KIT, by Does not apply route, Disp: , Rfl:     LOSARTAN POTASSIUM PO, Take 50 mg by mouth, Disp: , Rfl:     NOVOLOG 100 UNIT/ML injection, , Disp: , Rfl:     Omega-3 Fatty Acids (FISH OIL) 1,000 mg, Take 1,000 mg by mouth, Disp: , Rfl:     Specialty Vitamins Products (MARTI-RX DIABETIC VITAMIN PO), Take by mouth daily, Disp: , Rfl:      Allergies   Allergen Reactions    Penicillins Hives    Sulfa Antibiotics     Ampicillin Rash       Physical Exam:     Vitals:    01/16/20 1249   BP: (!) 204/104   Pulse: 80   Resp: 16   Temp: 98 1 °F (36 7 °C)     Physical Exam   Constitutional: She is oriented to person, place, and time  She appears well-developed and well-nourished  HENT:   Head: Normocephalic and atraumatic  Mouth/Throat: Oropharynx is clear and moist    Eyes: Pupils are equal, round, and reactive to light  EOM are normal    Neck: Normal range of motion  Neck supple  No JVD present  No tracheal deviation present  No thyromegaly present  Cardiovascular: Normal rate, regular rhythm, normal heart sounds and intact distal pulses  Exam reveals no gallop and no friction rub  No murmur heard  Pulmonary/Chest: Effort normal and breath sounds normal  No respiratory distress  She has no wheezes  She has no rales  Examination of the right breast in both the sitting and supine position demonstrate no skin changes nipple discharge dominant masses or axillary adenopathy  Examination of the left breast demonstrates a well-healed prior incision in the upper inner breast   At the 2 to 3 o'clock position is area of ecchymosis  There is a small mass consistent with a hematoma  I do not appreciate any adenopath  There is no nipple discharge or worrisome skin findings  Abdominal: Soft  She exhibits no distension and no mass  There is no hepatomegaly  There is no tenderness  There is no rebound and no guarding  The patient has a draining fistula track cover with clean gauze as outlined on the diagram    Musculoskeletal: Normal range of motion  She exhibits no edema or tenderness  Lymphadenopathy:     She has no cervical adenopathy  Neurological: She is alert and oriented to person, place, and time  No cranial nerve deficit  Skin: Skin is warm and dry  No rash noted  No erythema  Psychiatric: She has a normal mood and affect  Her behavior is normal    Vitals reviewed  Results:   I reviewed her mammogram and ultrasound with Dr Haleigh Sanchez  He is concerned that there may be a 2nd malignancy after reviewing the films  He has recommended an MRI I agree with this approach    This was conveyed to the patient  Patient has a Kim filter in place her card states that is made out of titanium and is MRI safe  Discussion/Summary:   Clinically this is a stage I left breast cancer however it may be multifocal   We have coordinated an MRI  I have also recommended genetic testing  We will see her back following these studies  The a 2nd area identified which may be malignant could most likely be encompassed with a single lumpectomy  This was conveyed to the patient  Advance Care Planning/Advance Directives:  I discussed the disease status, treatment plans and follow-up with the patient

## 2020-01-17 ENCOUNTER — TELEPHONE (OUTPATIENT)
Dept: GYNECOLOGIC ONCOLOGY | Facility: CLINIC | Age: 65
End: 2020-01-17

## 2020-01-17 NOTE — TELEPHONE ENCOUNTER
Pre-Test Genetic Counseling Consult Note    Patient Name: Sushma Baker   /Age: 1955/65 y o  Referring Provider: Renetta Han MD, PhD, FACS    Date of Service: 2020  Genetic Counselor: Cornell Metzger [facility]    Interpretation Services: None    Service: Type: Telephone consult   Length of Visit: 30 minutes      Светлана Gibbons was referred to the [address] [place] [address] and Genetic Assessment Program due to her personal and family history of breast cancer  Cancer and Treatment History:    Malignant neoplasm of upper-outer quadrant of left breast in female, estrogen receptor positive ([facility] [address] )     2019 Biopsy- Left breast ultrasound-guided biopsy  2 o'clock, 10 cm from nipple  Invasive mammary carcinoma of no special type  Grade 1  % Positive   AZ 90% Positive   HER2 1+   Appears unifocal - difficulty assessing accurate size on mammo vs US  2 1 cm vs 1 1 cm; possible concern for adjacent mass on original US not able to be visualized on biopsy  Left axilla US negative  Right breast clear  Appointment with Dr Rachel Centeno on 2020    Medical and Surgical History  Pertinent surgical history:   Past Surgical History:   Procedure Laterality Date    BREAST BIOPSY Left     neg    US GUIDED BREAST BIOPSY LEFT COMPLETE Left 2019      Pertinent medical history:No past medical history on file        Other History:  Height:   Ht Readings from Last 1 Encounters:   20 5' 4 5" (1 638 m)     Weight:   Wt Readings from Last 1 Encounters:   20 77 6 kg (171 lb)      Relevant Family History:  Ancestry: Mexican/Yemeni (Non-Orthodoxy) (maternal & paternal)    Please refer to the scanned pedigree in the Media Tab for a full pedigree    Relevant History:  -Mother breast cancer age 39  -Sister breast cancer 63's  -Maternal first cousin (male) prostate cancer 52's    -Father bladder cancer 63's  -Paternal aunt bone cancer  -Paternal fist-cousin once removed (male)  from melanoma in his 30's    *All history is reported as provided by the patient; records are not available for review, except where indicated  Assessment:    Meets NCCN V1 2020 Testing Criteria for High-Penetrance Breast and/or Ovarian Caner Susceptibility genes   (This often includes BRCA1, BRCA2, CDH1, PALB2, PTEN and TP53 among others )   1  Breast cancer with at least one of the following:   o Diagnosed at any age with:  o ?1 close blood relative with breast cancer at age ? Xiomara Damon meets NCCN testing criteria as she was diagnosed with breast cancer (any age) and has a mother (first-degree relative) diagnosed with breast cancer under the age of 48  Genetic testing for hereditary cancer is available via single gene analysis or panel testing of multiple genes associated with an increased risk of cancer  Since [employer] personal and family history is most concerning for hereditary breast cancer and there are no other cancers such as colon, uterine or ovarian cancer in her family history, she decided to focus on genes associated with hereditary breast cancer  The risks, benefits, and limitations of genetic testing were reviewed with the patient, as well as genetic discrimination laws, and possible test results (positive, negative, variants of uncertain significance) and their clinical implications  Ellie Erwin decided to proceed with testing and provided consent  Plan:     Summary: Genetic testing is indicated for Ellie Erwin based on meeting NCCN testing criteria  Sample Collection: Ellie Erwin was mailed an [facility] Blood Collection Kit and requisition form overnight to her home  We encouraged her to go to her local [facility] for a blood drawn as soon as possible  Genetic Testing Preformed:    1  [facility] Breast Cancer STAT Panel (9 genes): ALEKSANDAR, BRCA1, BRCA2, CDH1, CHEK2, PALB2, PTEN, STK11, TP53    2   [facility] Breast Cancer Panel (16 genes): ALEKSANDAR, BARD1, BRCA1, BRCA2, BRIP1, CDH1, CHEK2, NBN, NF1, PALB2, PTEN, RAD50, RAD51C, RAD51D, STK11, TP53    Genetic Testing Lab: [facility]    Results take approximately 2-3 weeks to complete once test is started  We will contact Dayana Kirk once results are available  Additional recommendations for surveillance/medical management will be made pending genetic test results

## 2020-01-19 ENCOUNTER — HOSPITAL ENCOUNTER (OUTPATIENT)
Dept: RADIOLOGY | Facility: HOSPITAL | Age: 65
Discharge: HOME | End: 2020-01-19
Attending: SURGERY
Payer: MEDICARE

## 2020-01-19 VITALS — BODY MASS INDEX: 28.16 KG/M2 | WEIGHT: 169 LBS | HEIGHT: 65 IN

## 2020-01-19 DIAGNOSIS — Z17.0 MALIGNANT NEOPLASM OF UPPER-OUTER QUADRANT OF LEFT BREAST IN FEMALE, ESTROGEN RECEPTOR POSITIVE (HCC): ICD-10-CM

## 2020-01-19 DIAGNOSIS — C50.412 MALIGNANT NEOPLASM OF UPPER-OUTER QUADRANT OF LEFT BREAST IN FEMALE, ESTROGEN RECEPTOR POSITIVE (HCC): ICD-10-CM

## 2020-01-19 PROCEDURE — A9585 GADOBUTROL INJECTION: HCPCS | Performed by: SURGERY

## 2020-01-19 PROCEDURE — C8937 CAD BREAST MRI: HCPCS

## 2020-01-19 PROCEDURE — C8908 MRI W/O FOL W/CONT, BREAST,: HCPCS

## 2020-01-19 RX ADMIN — GADOBUTROL 7 ML: 604.72 INJECTION INTRAVENOUS at 14:44

## 2020-01-20 ENCOUNTER — APPOINTMENT (OUTPATIENT)
Dept: LAB | Facility: CLINIC | Age: 65
End: 2020-01-20
Payer: MEDICARE

## 2020-01-20 ENCOUNTER — TRANSCRIBE ORDERS (OUTPATIENT)
Dept: ADMINISTRATIVE | Facility: HOSPITAL | Age: 65
End: 2020-01-20

## 2020-01-20 DIAGNOSIS — Z80.3 FAMILY HISTORY OF MALIGNANT NEOPLASM OF BREAST: Primary | ICD-10-CM

## 2020-01-20 DIAGNOSIS — C50.412 MALIGNANT NEOPLASM OF UPPER-OUTER QUADRANT OF LEFT FEMALE BREAST, UNSPECIFIED ESTROGEN RECEPTOR STATUS (HCC): ICD-10-CM

## 2020-01-20 DIAGNOSIS — Z80.3 FAMILY HISTORY OF MALIGNANT NEOPLASM OF BREAST: ICD-10-CM

## 2020-01-20 DIAGNOSIS — Z17.0 ESTROGEN RECEPTOR POSITIVE: ICD-10-CM

## 2020-01-20 PROCEDURE — 36415 COLL VENOUS BLD VENIPUNCTURE: CPT

## 2020-01-22 LAB — MISCELLANEOUS LAB TEST RESULT: NORMAL

## 2020-01-23 ENCOUNTER — TELEPHONE (OUTPATIENT)
Dept: SURGICAL ONCOLOGY | Facility: CLINIC | Age: 65
End: 2020-01-23

## 2020-01-23 NOTE — TELEPHONE ENCOUNTER
Called patient to review breast MRI results  There was no answer; message left with call back number provided  Patient returned the phone call  Explained that the breast MRI did not show any new worrisome findings  Informed patient that Dr James Cordoba reviewed her MRI and based on the report and images, would take a little bit larger piece during her surgery  Patient verbalized understanding and was appreciative of the phone call  Denies any questions at this time

## 2020-01-27 ENCOUNTER — TELEPHONE (OUTPATIENT)
Dept: SURGICAL ONCOLOGY | Facility: CLINIC | Age: 65
End: 2020-01-27

## 2020-01-27 NOTE — TELEPHONE ENCOUNTER
Called patient regarding her genetic testing results, which were negative  Patient verbalized understanding and was appreciative of the phone call  Denies any questions at this time  Informed patient that a paper copy of her results would be given at her appointment  Pre-op appointment with Dr Kimberly Solis verified for 1/30/2020

## 2020-01-30 ENCOUNTER — APPOINTMENT (OUTPATIENT)
Dept: LAB | Facility: CLINIC | Age: 65
End: 2020-01-30
Payer: MEDICARE

## 2020-01-30 ENCOUNTER — HOSPITAL ENCOUNTER (OUTPATIENT)
Dept: RADIOLOGY | Facility: HOSPITAL | Age: 65
Discharge: HOME | End: 2020-01-30
Attending: SURGERY
Payer: MEDICARE

## 2020-01-30 ENCOUNTER — OFFICE VISIT (OUTPATIENT)
Dept: SURGICAL ONCOLOGY | Facility: CLINIC | Age: 65
End: 2020-01-30
Payer: MEDICARE

## 2020-01-30 VITALS
DIASTOLIC BLOOD PRESSURE: 78 MMHG | TEMPERATURE: 98 F | SYSTOLIC BLOOD PRESSURE: 140 MMHG | HEIGHT: 65 IN | BODY MASS INDEX: 28.82 KG/M2 | HEART RATE: 88 BPM | RESPIRATION RATE: 16 BRPM | WEIGHT: 173 LBS

## 2020-01-30 DIAGNOSIS — Z17.0 MALIGNANT NEOPLASM OF UPPER-OUTER QUADRANT OF LEFT BREAST IN FEMALE, ESTROGEN RECEPTOR POSITIVE (HCC): Primary | ICD-10-CM

## 2020-01-30 DIAGNOSIS — Z01.818 PREOP EXAMINATION: ICD-10-CM

## 2020-01-30 DIAGNOSIS — C50.412 MALIGNANT NEOPLASM OF UPPER-OUTER QUADRANT OF LEFT BREAST IN FEMALE, ESTROGEN RECEPTOR POSITIVE (HCC): ICD-10-CM

## 2020-01-30 DIAGNOSIS — Z17.0 MALIGNANT NEOPLASM OF UPPER-OUTER QUADRANT OF LEFT BREAST IN FEMALE, ESTROGEN RECEPTOR POSITIVE (HCC): ICD-10-CM

## 2020-01-30 DIAGNOSIS — Z13.71 BRCA GENE MUTATION NEGATIVE: ICD-10-CM

## 2020-01-30 DIAGNOSIS — C50.412 MALIGNANT NEOPLASM OF UPPER-OUTER QUADRANT OF LEFT BREAST IN FEMALE, ESTROGEN RECEPTOR POSITIVE (HCC): Primary | ICD-10-CM

## 2020-01-30 LAB
ALBUMIN SERPL BCP-MCNC: 3.5 G/DL (ref 3.5–5)
ALP SERPL-CCNC: 99 U/L (ref 46–116)
ALT SERPL W P-5'-P-CCNC: 22 U/L (ref 12–78)
ANION GAP SERPL CALCULATED.3IONS-SCNC: 8 MMOL/L (ref 4–13)
AST SERPL W P-5'-P-CCNC: 16 U/L (ref 5–45)
ATRIAL RATE: 67 BPM
BASOPHILS # BLD AUTO: 0.03 THOUSANDS/ΜL (ref 0–0.1)
BASOPHILS NFR BLD AUTO: 1 % (ref 0–1)
BILIRUB SERPL-MCNC: 0.17 MG/DL (ref 0.2–1)
BUN SERPL-MCNC: 15 MG/DL (ref 5–25)
CALCIUM ALBUM COR SERPL-MCNC: 10.6 MG/DL (ref 8.3–10.1)
CALCIUM SERPL-MCNC: 10.2 MG/DL (ref 8.3–10.1)
CHLORIDE SERPL-SCNC: 106 MMOL/L (ref 100–108)
CO2 SERPL-SCNC: 28 MMOL/L (ref 21–32)
CREAT SERPL-MCNC: 0.68 MG/DL (ref 0.6–1.3)
EOSINOPHIL # BLD AUTO: 0.13 THOUSAND/ΜL (ref 0–0.61)
EOSINOPHIL NFR BLD AUTO: 2 % (ref 0–6)
ERYTHROCYTE [DISTWIDTH] IN BLOOD BY AUTOMATED COUNT: 13.4 % (ref 11.6–15.1)
GFR SERPL CREATININE-BSD FRML MDRD: 91 ML/MIN/1.73SQ M
GLUCOSE SERPL-MCNC: 171 MG/DL (ref 65–140)
HCT VFR BLD AUTO: 39.2 % (ref 34.8–46.1)
HGB BLD-MCNC: 12.5 G/DL (ref 11.5–15.4)
IMM GRANULOCYTES # BLD AUTO: 0.01 THOUSAND/UL (ref 0–0.2)
IMM GRANULOCYTES NFR BLD AUTO: 0 % (ref 0–2)
LYMPHOCYTES # BLD AUTO: 1.95 THOUSANDS/ΜL (ref 0.6–4.47)
LYMPHOCYTES NFR BLD AUTO: 36 % (ref 14–44)
MCH RBC QN AUTO: 28 PG (ref 26.8–34.3)
MCHC RBC AUTO-ENTMCNC: 31.9 G/DL (ref 31.4–37.4)
MCV RBC AUTO: 88 FL (ref 82–98)
MONOCYTES # BLD AUTO: 0.35 THOUSAND/ΜL (ref 0.17–1.22)
MONOCYTES NFR BLD AUTO: 6 % (ref 4–12)
NEUTROPHILS # BLD AUTO: 2.98 THOUSANDS/ΜL (ref 1.85–7.62)
NEUTS SEG NFR BLD AUTO: 55 % (ref 43–75)
NRBC BLD AUTO-RTO: 0 /100 WBCS
P AXIS: 0 DEGREES
PLATELET # BLD AUTO: 147 THOUSANDS/UL (ref 149–390)
PMV BLD AUTO: 10.3 FL (ref 8.9–12.7)
POTASSIUM SERPL-SCNC: 4 MMOL/L (ref 3.5–5.3)
PR INTERVAL: 150 MS
PROT SERPL-MCNC: 7.4 G/DL (ref 6.4–8.2)
QRS AXIS: 53 DEGREES
QRSD INTERVAL: 80 MS
QT INTERVAL: 392 MS
QTC INTERVAL: 414 MS
RBC # BLD AUTO: 4.46 MILLION/UL (ref 3.81–5.12)
SODIUM SERPL-SCNC: 142 MMOL/L (ref 136–145)
T WAVE AXIS: 37 DEGREES
VENTRICULAR RATE: 67 BPM
WBC # BLD AUTO: 5.45 THOUSAND/UL (ref 4.31–10.16)

## 2020-01-30 PROCEDURE — 99214 OFFICE O/P EST MOD 30 MIN: CPT | Performed by: SURGERY

## 2020-01-30 PROCEDURE — 71046 X-RAY EXAM CHEST 2 VIEWS: CPT

## 2020-01-30 PROCEDURE — 93010 ELECTROCARDIOGRAM REPORT: CPT | Performed by: INTERNAL MEDICINE

## 2020-01-30 PROCEDURE — 80053 COMPREHEN METABOLIC PANEL: CPT

## 2020-01-30 PROCEDURE — 85025 COMPLETE CBC W/AUTO DIFF WBC: CPT

## 2020-01-30 PROCEDURE — 36415 COLL VENOUS BLD VENIPUNCTURE: CPT

## 2020-01-30 PROCEDURE — 93005 ELECTROCARDIOGRAM TRACING: CPT

## 2020-01-30 RX ORDER — DICYCLOMINE HCL 20 MG
20 TABLET ORAL EVERY 6 HOURS
COMMUNITY
End: 2020-02-13

## 2020-01-30 RX ORDER — OXYCODONE HYDROCHLORIDE AND ACETAMINOPHEN 5; 325 MG/1; MG/1
1 TABLET ORAL EVERY 6 HOURS PRN
Qty: 6 TABLET | Refills: 0 | Status: SHIPPED | OUTPATIENT
Start: 2020-01-30 | End: 2021-01-15 | Stop reason: ALTCHOICE

## 2020-01-30 NOTE — PATIENT INSTRUCTIONS
Pre-Surgery Instructions:   Medication Instructions    Aspirin 81 MG EC tablet per anesthesia guidelines     Cholecalciferol (VITAMIN D3 PO) Stop taking 1 week prior to surgery    Cyanocobalamin (VITAMIN B 12) 100 MCG LOZG Stop taking 1 week prior to surgery    dicyclomine (BENTYL) 20 mg tablet Stop taking 1 days prior to surgery    docusate sodium (COLACE) 100 mg capsule Stop taking 1 days prior to surgery    fexofenadine (ALLEGRA) 60 MG tablet Stop taking 1 days prior to surgery    hydrocortisone 0 5 % cream Stop taking 1 days prior to surgery         LOSARTAN POTASSIUM PO Stop taking 1 days prior to surgery    NOVOLOG 100 UNIT/ML injection Stop taking 1 days prior to surgery    Omega-3 Fatty Acids (FISH OIL) 1,000 mg Stop taking 1 week prior to surgery    Specialty Vitamins Products (MARTI-RX DIABETIC VITAMIN PO) Stop taking 1 week prior to surgery             Breast Lumpectomy   AMBULATORY CARE:   What you need to know about a lumpectomy:  A lumpectomy is surgery to remove a mass in your breast  Breast tissue that surrounds the mass may also be taken  A lumpectomy is also known as breast-conserving surgery, a partial mastectomy, or a segmental mastectomy  How to prepare for a lumpectomy:   · You may need a mammogram or ultrasound before surgery  These tests may be done the same day as your surgery or at an earlier time  Your healthcare provider may use pictures from these tests to soco the location of the mass  The marker will show him where to make your incision  · Your healthcare provider will talk to you about how to prepare for surgery  He may tell you not to eat or drink anything after midnight on the day of your surgery  He will tell you what medicines to take or not take on the day of your surgery  You may need to stop taking blood thinners or aspirin several days before your surgery  Arrange for someone to drive you home and stay with you for 24 hours after surgery   This person can help care for you, and monitor for any problems  What will happen during a lumpectomy:  You will be given general anesthesia to keep you asleep and free from pain during surgery  You may be given an antibiotic through your IV to help prevent a bacterial infection  Your healthcare provider will make an incision in your breast and remove the mass  He may also remove breast tissue or lymph nodes that are close to the mass  A drain may be inserted near your incision to remove extra fluid  This will decrease swelling and help your incision heal  Your healthcare provider will close your incision with stitches or strips of medical tape and cover it with a bandage  He may also wrap a tight-fitting bandage around both of your breasts  This may decrease swelling, bleeding, and pain  What will happen after a lumpectomy:  Healthcare providers will monitor you until you are awake  You may able to go home when you are awake and your pain is controlled  Instead you may need to spend the night in the hospital    Risks of a lumpectomy:  You may bleed more than expected or get an infection  Nerves, blood vessels, and muscles may be damaged during your surgery  You may have swelling in your arm closest to the lumpectomy or where lymph nodes were removed  This swelling is called lymphedema  Lymphedema may cause tingling, numbness, stiffness, and weakness in your arm  This may be permanent  You may get a blood clot in your arm or leg  The blood clot may travel to your heart lungs, or brain  This may become life-threatening  Call 911 for any of the following:   · You feel lightheaded, short of breath, and have chest pain  · You cough up blood  · You have trouble breathing  Seek care immediately if:   · Blood soaks through your bandage  · Your stitches come apart  · Your bruise suddenly gets bigger  · Your leg or arm is larger than normal and painful    Contact your healthcare provider if:   · You have a fever or chills  · Your wound is red, swollen, or draining pus  · You have nausea or are vomiting  · Your skin is itchy, swollen, or you have a rash  · Your pain does not get better after you take pain medicine  · Your drain falls out or stops draining fluid  · Your drain has pus or foul-smelling fluid coming out of it  · You have questions or concerns about your condition or care  Medicines: You may need any of the following:  · Antibiotics  help prevent a bacterial infection  · Prescription pain medicine  may be given  Ask your healthcare provider how to take this medicine safely  Some prescription pain medicines contain acetaminophen  Do not take other medicines that contain acetaminophen without talking to your healthcare provider  Too much acetaminophen may cause liver damage  Prescription pain medicine may cause constipation  Ask your healthcare provider how to prevent or treat constipation  · NSAIDs , such as ibuprofen, help decrease swelling, pain, and fever  NSAIDs can cause stomach bleeding or kidney problems in certain people  If you take blood thinner medicine, always ask your healthcare provider if NSAIDs are safe for you  Always read the medicine label and follow directions  · Take your medicine as directed  Contact your healthcare provider if you think your medicine is not helping or if you have side effects  Tell him or her if you are allergic to any medicine  Keep a list of the medicines, vitamins, and herbs you take  Include the amounts, and when and why you take them  Bring the list or the pill bottles to follow-up visits  Carry your medicine list with you in case of an emergency  Care for your wound as directed: If you have a tight-fitting bandage, you can remove it in 24 to 48 hours, or as directed  Ask your healthcare provider when your incision can get wet  You may need to take a sponge bath until your drain is removed   Carefully wash around the incision with soap and water  It is okay to allow the soap and water to gently run over your incision  Gently pat dry the area and put on new, clean bandages as directed  Change your bandages when they get wet or dirty  Check your incision every day for redness, pus, or swelling  Self-care:   · Apply ice  on your breast for 15 to 20 minutes every hour or as directed  Use an ice pack, or put crushed ice in a plastic bag  Cover it with a towel  Ice helps prevent tissue damage and decreases swelling and pain  · Rest  as directed  Do not lift anything heavy  Do not push or pull with your arms  Take short walks around the house  Gradually walk further as you feel better  Ask your healthcare provider when you can return to your normal activities  · Empty your drain  as directed  You may need to write down how much you empty from your drain each day  Ask your healthcare provider for more information about how to empty your drain  · Wear a supportive bra  as directed  Wait until you remove the tight-fitting bandage to wear a bra  You may be given a surgical bra or told to wear a sports bra  A supportive bra may help hold your bandages in place  It may also help with swelling and pain  Do not  wear bras with lace or underwire  They may rub against your incision and cause discomfort  Arm stretches: Your healthcare provider may show you how to do arm stretches  Arm stretches may prevent stiff arms or shoulders  You may need to wait until after your drains are removed to begin stretching  Do not do arm stretches until your healthcare provider says it is okay  Ask your healthcare provider for more information about arm stretches  Follow up with your healthcare provider as directed:  Write down your questions so you remember to ask them during your visits  © 2017 [employer] Information is for End User's use only and may not be sold, redistributed or otherwise used for commercial purposes   All illustrations and images included in CareNotes® are the copyrighted property of [employer]  or [employer]  The above information is an  only  It is not intended as medical advice for individual conditions or treatments  Talk to your doctor, nurse or pharmacist before following any medical regimen to see if it is safe and effective for you  Breast Cancer [place] Lymph Node Biopsy   AMBULATORY CARE:   What you need to know about a sentinel lymph node biopsy (SLNB):  A sentinel lymph node (SLN) is usually the lymph node closest to the breast tumor  It is usually found in the armpit, or along the sternum (breastbone) or collarbone  A biopsy is a procedure used to find and remove a SLN  During the biopsy, the SLN will be tested for cancer cells  If the test is positive, it may mean that breast cancer has spread outside of your breast  This information can help your healthcare provider decide what other treatments you need  How to prepare for a SLNB:   · You may need a nuclear scan before your procedure  During a nuclear scan, healthcare providers will inject a small amount of radioactive liquid in your breast  Radioactive liquid will move to the location of your lymph nodes and help them show up better in pictures  A camera will take pictures of the lymph nodes  The pictures will help your healthcare provider plan for your procedure  · Your healthcare provider will talk to you about how to prepare for your procedure  He may tell you not to eat or drink anything after midnight on the day of your procedure  He will tell you what medicines to take or not take on the day of your procedure  You may be given contrast liquid during your biopsy  Tell your healthcare provider if you have ever had an allergic reaction to contrast liquid  Arrange for someone to drive you home and stay with you after your procedure    What will happen during a SLNB:   · You may be given an antibiotic through your IV to help prevent a bacterial infection  Tell the healthcare provider if you have ever had an allergic reaction to an antibiotic  You may be given general anesthesia to keep you asleep and free from pain during your procedure  You may instead be given local anesthesia to numb the area  With local anesthesia, you may still feel pressure or pushing during the procedure, but you should not feel any pain  · Your healthcare provider will inject blue contrast liquid, radioactive liquid, or both near the tumor  The liquid will move to the SLN  Your healthcare provider may use an instrument to help find the SLN  He will do this by gently moving an instrument over your skin  The instrument will show pictures of the SLN on a monitor  Your healthcare provider will make a small incision in the skin that covers the SLN  The incision is usually in your armpit or chest  The SLN will be removed and checked for cancer cells  If cancer is found, your healthcare provider may remove several more lymph nodes for testing  Your incision may be closed with stitches or strips of medical tape and covered with a bandage  What will happen after a SLNB:  Healthcare providers will monitor you until you are awake  You may be able to go home after you are awake and your pain is controlled  Your urine or bowel movement may be blue for 24 to 48 hours after your procedure  This is caused by the blue contrast liquid given to you during the procedure  You may have bruising or swelling at the biopsy site  This is normal and expected  The arm closest to the biopsy site may be sore  This should get better within 48 to 72 hours  Risks of a SLNB:  You may bleed more than expected or get an infection  You may develop a condition called lymphedema  Lymphedema is tissue swelling in your arm nearest to where the SLN was removed  You may have long-term pain or discomfort in your arm  Your skin in the arm may be permanently thick or hard   Your nerves may be damaged during your procedure  This may cause numbness or tingling in your arm  It may also cause difficulty moving your arm  You may have an allergic reaction to the contrast liquid  This may require medicine or other treatments  Seek care immediately if:   · Blood soaks through your bandage  · Your stitches come apart  · Your bruise suddenly gets larger and feels firm  Contact your healthcare provider if:   · You have a fever or chills  · Your wound is red, swollen, or draining pus  · You have nausea or are vomiting  · Your skin is itchy, swollen, or you have a rash  · Your pain does not get better after you take medicine for pain  · You have questions or concerns about your condition or care  Medicines: You may need any of the following:  · NSAIDs , such as ibuprofen, help decrease swelling, pain, and fever  This medicine is available with or without a doctor's order  NSAIDs can cause stomach bleeding or kidney problems in certain people  If you take blood thinner medicine, always ask your healthcare provider if NSAIDs are safe for you  Always read the medicine label and follow directions  · Acetaminophen  decreases pain and fever  It is available without a doctor's order  Ask how much to take and how often to take it  Follow directions  Read the labels of all other medicines you are using to see if they also contain acetaminophen, or ask your doctor or pharmacist  Acetaminophen can cause liver damage if not taken correctly  Do not use more than 4 grams (4,000 milligrams) total of acetaminophen in one day  · Prescription pain medicine  may be given  Ask your healthcare provider how to take this medicine safely  Some prescription pain medicines contain acetaminophen  Do not take other medicines that contain acetaminophen without talking to your healthcare provider  Too much acetaminophen may cause liver damage  Prescription pain medicine may cause constipation   Ask your healthcare provider how to prevent or treat constipation  · Take your medicine as directed  Contact your healthcare provider if you think your medicine is not helping or if you have side effects  Tell him or her if you are allergic to any medicine  Keep a list of the medicines, vitamins, and herbs you take  Include the amounts, and when and why you take them  Bring the list or the pill bottles to follow-up visits  Carry your medicine list with you in case of an emergency  Care for your incision wound as directed:  Ask your healthcare provider when your wound can get wet  Carefully wash around the wound with soap and water  It is okay to let soap and water gently run over your wound  Do not  scrub your wound  Gently pat dry the area and put on new, clean bandages as directed  Change your bandages when they get wet or dirty  If you have strips of medical tape, let them fall of on their own  It may take 10 to 14 days for them to fall off  Check your wound every day for signs of infection, such as redness, swelling, or pus  Do not put powders or lotions on your wound  If lymph nodes have been taken from your armpit, ask your healthcare provider when you can wear deodorant  Self-care:   · Apply ice  on your wound for 15 to 20 minutes every hour or as directed  Use an ice pack, or put crushed ice in a plastic bag  Cover it with a towel before you apply it to your skin  Ice helps prevent tissue damage and decreases swelling and pain  · Elevate  your arm nearest to the biopsy site as often as you can  This will help decrease swelling and pain  Prop your arm on pillows or blankets to keep it elevated above the level of your heart comfortably  · Do not do strenuous activities  for 24 to 48 hours  Strenuous activities include heavy lifting, sports, or running  If lymph nodes were taken from your armpit, do not push or pull with your arm  These activities may put too much stress on your wound   Rest and take short walks around the house  Ask your healthcare provider when you can return to your normal activities  · Drink plenty of liquids  as directed  This will help flush out contrast liquid from your body  Ask how much liquid to drink each day and which liquids are best for you  Ask your healthcare provider how to prevent lymphedema and infection:  Lymphedema is fluid buildup in fatty tissues under your skin  Lymphedema may happen in the arm closest to where lymph nodes were removed  An infection in your skin can make lymphedema worse  Ask your healthcare provider how you can decrease your risk for skin infections and lymphedema  Follow up with your healthcare provider as directed:  Write down your questions so you remember to ask them during your visits  © 2017 [address] [employer]  Information is for End User's use only and may not be sold, redistributed or otherwise used for commercial purposes  All illustrations and images included in CareNotes® are the copyrighted property of [employer]  or [employer]  The above information is an  only  It is not intended as medical advice for individual conditions or treatments  Talk to your doctor, nurse or pharmacist before following any medical regimen to see if it is safe and effective for you

## 2020-01-30 NOTE — H&P (VIEW-ONLY)
Surgical Oncology Follow Up       [address] [facility],[address]  [facility]  [address] [facility] [address]    Ralph Hernandez  1955  [id number]  [address] [facility],[address]  [facility] [place]  [address] [address]    Chief Complaint   Patient presents with    Pre-op Exam          Assessment & Plan:   Patient presents for follow-up visit  She has had her MRI which demonstrated a possible 2nd lesion adjacent to the 1st and could be resected in a single resection  Her genetic testing was negative  We went through the process of informed consent for a left breast needle localization lumpectomy in conjunction with a sentinel lymph node biopsy and possible axillary dissection  We will coordinate the surgery next mutual convenience  Cancer History:        Malignant neoplasm of upper-outer quadrant of left breast in female, estrogen receptor positive ([facility] [address] )    12/23/2019 Biopsy     Left breast ultrasound-guided biopsy  2 o'clock, 10 cm from nipple  Invasive mammary carcinoma of no special type  Grade 1    IN 90  HER2 1+    Concordant  Appears unifocal - difficulty assessing accurate size on mammo vs US  2 1 cm vs 1 1 cm; possible concern for adjacent mass on original US not able to be visualized on biopsy  Left axilla US negative  Right breast clear  1/17/2020 Genetic Testing     The following genes were evaluated: ALEKSANDAR, BARD1, BRCA1, BRCA2, BRIP1, CDH1, CHEK2, NBN, NF1, PALB2, PTEN, RAD50, STK11, TP53  Negative result  No pathogenic sequence variants or deletions/dupllications identified  [facility]           Interval History:   Patient has had her genetic testing as well as an MRI  I have reviewed the films  We have contact her regarding her genetic salts previously  Review of Systems:   Review of Systems   All other systems reviewed and are negative        Past Medical History     Patient Active Problem List   Diagnosis  DVT of upper extremity (deep vein thrombosis) (HCC)    Type I diabetes mellitus, uncontrolled ([facility] [address] )    Encounter for gynecological examination without abnormal finding    Malignant neoplasm of upper-outer quadrant of left breast in female, estrogen receptor positive ([facility] [address] )     Past Medical History:   Diagnosis Date    BRCA gene mutation negative 01/2020    [facility]    Breast cancer [facility])      Past Surgical History:   Procedure Laterality Date    BREAST BIOPSY Left 2009    neg    BREAST BIOPSY Left 2019    positive- could not find second lump     US GUIDED BREAST BIOPSY LEFT COMPLETE Left 12/23/2019     Family History   Problem Relation Age of Onset    Breast cancer Mother 43    Cervical cancer Mother 58    Breast cancer Sister 61    No Known Problems Maternal Grandmother     No Known Problems Paternal Grandmother     No Known Problems Maternal Aunt     Bone cancer Paternal Aunt 43    No Known Problems Paternal Aunt     Cancer Father         Bladder     Social History     Socioeconomic History    Marital status: /Civil Union     Spouse name: Not on file    Number of children: Not on file    Years of education: Not on file    Highest education level: Not on file   Occupational History    Not on file   Social Needs    Financial resource strain: Not on file    Food insecurity:     Worry: Not on file     Inability: Not on file    Transportation needs:     Medical: Not on file     Non-medical: Not on file   Tobacco Use    Smoking status: Never Smoker    Smokeless tobacco: Never Used   Substance and Sexual Activity    Alcohol use: No    Drug use: No    Sexual activity: Not Currently     Birth control/protection: Post-menopausal   Lifestyle    Physical activity:     Days per week: Not on file     Minutes per session: Not on file    Stress: Not on file   Relationships    Social connections:     Talks on phone: Not on file     Gets together: Not on file     Attends Synagogue service: Not on file     Active member of club or organization: Not on file     Attends meetings of clubs or organizations: Not on file     Relationship status: Not on file    Intimate partner violence:     Fear of current or ex partner: Not on file     Emotionally abused: Not on file     Physically abused: Not on file     Forced sexual activity: Not on file   Other Topics Concern    Not on file   Social History Narrative    Not on file       Current Outpatient Medications:     Aspirin 81 MG EC tablet, Take by mouth, Disp: , Rfl:     Cholecalciferol (VITAMIN D3 PO), Take by mouth, Disp: , Rfl:     Cyanocobalamin (VITAMIN B 12) 100 MCG LOZG, Take by mouth, Disp: , Rfl:     dicyclomine (BENTYL) 20 mg tablet, Take 20 mg by mouth every 6 (six) hours, Disp: , Rfl:     docusate sodium (COLACE) 100 mg capsule, Take 100 mg by mouth 2 (two) times a day, Disp: , Rfl:     fexofenadine (ALLEGRA) 60 MG tablet, Take by mouth, Disp: , Rfl:     hydrocortisone 0 5 % cream, Apply topically 2 (two) times a day, Disp: , Rfl:     Insulin Infusion Pump KIT, by Does not apply route, Disp: , Rfl:     LOSARTAN POTASSIUM PO, Take 50 mg by mouth, Disp: , Rfl:     NOVOLOG 100 UNIT/ML injection, , Disp: , Rfl:     Omega-3 Fatty Acids (FISH OIL) 1,000 mg, Take 1,000 mg by mouth, Disp: , Rfl:     Specialty Vitamins Products (MARTI-RX DIABETIC VITAMIN PO), Take by mouth daily, Disp: , Rfl:   Allergies   Allergen Reactions    Penicillins Hives    Sulfa Antibiotics     Ampicillin Rash       Physical Exam:     Vitals:    01/30/20 1509   BP: 140/78   Pulse: 88   Resp: 16   Temp: 98 °F (36 7 °C)     Physical Exam   Constitutional: She is oriented to person, place, and time  She appears well-developed and well-nourished  HENT:   Head: Normocephalic and atraumatic  Mouth/Throat: Oropharynx is clear and moist    Eyes: Pupils are equal, round, and reactive to light  EOM are normal    Neck: Normal range of motion  Neck supple  No JVD present   No tracheal deviation present  No thyromegaly present  Cardiovascular: Normal rate, regular rhythm, normal heart sounds and intact distal pulses  Exam reveals no gallop and no friction rub  No murmur heard  Pulmonary/Chest: Effort normal and breath sounds normal  No respiratory distress  She has no wheezes  She has no rales  Examination of the right breast demonstrates no worrisome skin findings dominant masses or axillary adenopathy  Examination of the left breast demonstrates a well-healed biopsy soco in the upper outer quadrant there is a subtle area of increased parenchymal tissue possibly be resulting from her prior hematoma  But I cannot describe this is a dominant mass  I do not appreciate any axillary adenopathy  Abdominal: Soft  She exhibits no distension and no mass  There is no hepatomegaly  There is no tenderness  There is no rebound and no guarding  Musculoskeletal: Normal range of motion  She exhibits no edema or tenderness  Lymphadenopathy:     She has no cervical adenopathy  Neurological: She is alert and oriented to person, place, and time  No cranial nerve deficit  Skin: Skin is warm and dry  No rash noted  No erythema  Psychiatric: She has a normal mood and affect  Her behavior is normal    Vitals reviewed  Results & Discussion:   I reviewed her MRI films as well as the report  I discussed these findings with the patient  We agreed to move forward with the lumpectomy  She is aware that we are trying to target basically 2 lesions based on a single clip  All complications as outlined on the consent form were reviewed with the patient  Additionally she understands that we would recommend radiation therapy and anti hormonal therapy as adjuvant therapies and possibly chemotherapy based on factors on her final pathology  We will coordinate the surgery next mutual convenience            Advance Care Planning/Advance Directives:  I discussed the disease status, treatment plans and follow-up with the patient

## 2020-01-30 NOTE — PROGRESS NOTES
Surgical Oncology Follow Up       [address] [facility],[address]  [facility] [place]  [address] [facility] [address]    [facility]  1955  [id number]  [address] [facility],[address] [facility]  [facility] [place]  [address] [facility] [address]    Chief Complaint   Patient presents with    Pre-op Exam          Assessment & Plan:   Patient presents for follow-up visit  She has had her MRI which demonstrated a possible 2nd lesion adjacent to the 1st and could be resected in a single resection  Her genetic testing was negative  We went through the process of informed consent for a left breast needle localization lumpectomy in conjunction with a sentinel lymph node biopsy and possible axillary dissection  We will coordinate the surgery next mutual convenience  Cancer History:        Malignant neoplasm of upper-outer quadrant of left breast in female, estrogen receptor positive ([facility] [address] )    12/23/2019 Biopsy     Left breast ultrasound-guided biopsy  2 o'clock, 10 cm from nipple  Invasive mammary carcinoma of no special type  Grade 1    GA 90  HER2 1+    Concordant  Appears unifocal - difficulty assessing accurate size on mammo vs US  2 1 cm vs 1 1 cm; possible concern for adjacent mass on original US not able to be visualized on biopsy  Left axilla US negative  Right breast clear  1/17/2020 Genetic Testing     The following genes were evaluated: ALEKSANDAR, BARD1, BRCA1, BRCA2, BRIP1, CDH1, CHEK2, NBN, NF1, PALB2, PTEN, RAD50, STK11, TP53  Negative result  No pathogenic sequence variants or deletions/dupllications identified  [facility]           Interval History:   Patient has had her genetic testing as well as an MRI  I have reviewed the films  We have contact her regarding her genetic salts previously  Review of Systems:   Review of Systems   All other systems reviewed and are negative        Past Medical History     Patient Active Problem List   Diagnosis  DVT of upper extremity (deep vein thrombosis) (HCC)    Type I diabetes mellitus, uncontrolled ([facility] [address] )    Encounter for gynecological examination without abnormal finding    Malignant neoplasm of upper-outer quadrant of left breast in female, estrogen receptor positive ([facility] [address] )     Past Medical History:   Diagnosis Date    BRCA gene mutation negative 01/2020    [facility]    Breast cancer [facility])      Past Surgical History:   Procedure Laterality Date    BREAST BIOPSY Left 2009    neg    BREAST BIOPSY Left 2019    positive- could not find second lump     US GUIDED BREAST BIOPSY LEFT COMPLETE Left 12/23/2019     Family History   Problem Relation Age of Onset    Breast cancer Mother 43    Cervical cancer Mother 58    Breast cancer Sister 61    No Known Problems Maternal Grandmother     No Known Problems Paternal Grandmother     No Known Problems Maternal Aunt     Bone cancer Paternal Aunt 43    No Known Problems Paternal Aunt     Cancer Father         Bladder     Social History     Socioeconomic History    Marital status: /Civil Union     Spouse name: Not on file    Number of children: Not on file    Years of education: Not on file    Highest education level: Not on file   Occupational History    Not on file   Social Needs    Financial resource strain: Not on file    Food insecurity:     Worry: Not on file     Inability: Not on file    Transportation needs:     Medical: Not on file     Non-medical: Not on file   Tobacco Use    Smoking status: Never Smoker    Smokeless tobacco: Never Used   Substance and Sexual Activity    Alcohol use: No    Drug use: No    Sexual activity: Not Currently     Birth control/protection: Post-menopausal   Lifestyle    Physical activity:     Days per week: Not on file     Minutes per session: Not on file    Stress: Not on file   Relationships    Social connections:     Talks on phone: Not on file     Gets together: Not on file     Attends Judaism service: Not on file     Active member of club or organization: Not on file     Attends meetings of clubs or organizations: Not on file     Relationship status: Not on file    Intimate partner violence:     Fear of current or ex partner: Not on file     Emotionally abused: Not on file     Physically abused: Not on file     Forced sexual activity: Not on file   Other Topics Concern    Not on file   Social History Narrative    Not on file       Current Outpatient Medications:     Aspirin 81 MG EC tablet, Take by mouth, Disp: , Rfl:     Cholecalciferol (VITAMIN D3 PO), Take by mouth, Disp: , Rfl:     Cyanocobalamin (VITAMIN B 12) 100 MCG LOZG, Take by mouth, Disp: , Rfl:     dicyclomine (BENTYL) 20 mg tablet, Take 20 mg by mouth every 6 (six) hours, Disp: , Rfl:     docusate sodium (COLACE) 100 mg capsule, Take 100 mg by mouth 2 (two) times a day, Disp: , Rfl:     fexofenadine (ALLEGRA) 60 MG tablet, Take by mouth, Disp: , Rfl:     hydrocortisone 0 5 % cream, Apply topically 2 (two) times a day, Disp: , Rfl:     Insulin Infusion Pump KIT, by Does not apply route, Disp: , Rfl:     LOSARTAN POTASSIUM PO, Take 50 mg by mouth, Disp: , Rfl:     NOVOLOG 100 UNIT/ML injection, , Disp: , Rfl:     Omega-3 Fatty Acids (FISH OIL) 1,000 mg, Take 1,000 mg by mouth, Disp: , Rfl:     Specialty Vitamins Products (MARTI-RX DIABETIC VITAMIN PO), Take by mouth daily, Disp: , Rfl:   Allergies   Allergen Reactions    Penicillins Hives    Sulfa Antibiotics     Ampicillin Rash       Physical Exam:     Vitals:    01/30/20 1509   BP: 140/78   Pulse: 88   Resp: 16   Temp: 98 °F (36 7 °C)     Physical Exam   Constitutional: She is oriented to person, place, and time  She appears well-developed and well-nourished  HENT:   Head: Normocephalic and atraumatic  Mouth/Throat: Oropharynx is clear and moist    Eyes: Pupils are equal, round, and reactive to light  EOM are normal    Neck: Normal range of motion  Neck supple  No JVD present   No tracheal deviation present  No thyromegaly present  Cardiovascular: Normal rate, regular rhythm, normal heart sounds and intact distal pulses  Exam reveals no gallop and no friction rub  No murmur heard  Pulmonary/Chest: Effort normal and breath sounds normal  No respiratory distress  She has no wheezes  She has no rales  Examination of the right breast demonstrates no worrisome skin findings dominant masses or axillary adenopathy  Examination of the left breast demonstrates a well-healed biopsy soco in the upper outer quadrant there is a subtle area of increased parenchymal tissue possibly be resulting from her prior hematoma  But I cannot describe this is a dominant mass  I do not appreciate any axillary adenopathy  Abdominal: Soft  She exhibits no distension and no mass  There is no hepatomegaly  There is no tenderness  There is no rebound and no guarding  Musculoskeletal: Normal range of motion  She exhibits no edema or tenderness  Lymphadenopathy:     She has no cervical adenopathy  Neurological: She is alert and oriented to person, place, and time  No cranial nerve deficit  Skin: Skin is warm and dry  No rash noted  No erythema  Psychiatric: She has a normal mood and affect  Her behavior is normal    Vitals reviewed  Results & Discussion:   I reviewed her MRI films as well as the report  I discussed these findings with the patient  We agreed to move forward with the lumpectomy  She is aware that we are trying to target basically 2 lesions based on a single clip  All complications as outlined on the consent form were reviewed with the patient  Additionally she understands that we would recommend radiation therapy and anti hormonal therapy as adjuvant therapies and possibly chemotherapy based on factors on her final pathology  We will coordinate the surgery next mutual convenience            Advance Care Planning/Advance Directives:  I discussed the disease status, treatment plans and follow-up with the patient

## 2020-01-31 PROCEDURE — 1124F ACP DISCUSS-NO DSCNMKR DOCD: CPT | Performed by: SURGERY

## 2020-02-07 ENCOUNTER — TELEPHONE (OUTPATIENT)
Dept: GYNECOLOGIC ONCOLOGY | Facility: CLINIC | Age: 65
End: 2020-02-07

## 2020-02-07 NOTE — TELEPHONE ENCOUNTER
I left a VM for Komal Steen letting her know that her genetic test result is complete  I encouraged her to call the main Genetics number [phone] to discuss further

## 2020-02-13 NOTE — TELEPHONE ENCOUNTER
Post-Test Genetic Counseling Consult Note    Patient Name: Ga Mcdaniel DOB/Age: 1955/65 y o  Referring Provider: Elena Sultana MD, PhD, FACS    Date of Service: 2020  [facility]    Interpretation Services: None    Service: Type: Telephone consult   Length of Visit: <30 minutes       Pastor Cornejo and I spoke today for post-test genetic counseling to discuss the results of her genetic test for hereditary cancer  We met previously on 20 for pre-test counseling  SUMMARY:    Test(s):     1  [facility] Breast Cancer STAT Panel (9 genes): ALEKSANDAR, BRCA1, BRCA2, CDH1, CHEK2, PALB2, PTEN, STK11, TP53     Result: Negative - No reportable genetic variants were identified     2  [facility] Breast Cancer Panel (16 genes): ALEKSANDAR, BARD1, BRCA1, BRCA2, BRIP1, CDH1, CHEK2, NBN, NF1, PALB2, PTEN, RAD50, RAD51C, RAD51D, STK11, TP53     Result: Negative - No reportable genetic variants were identified    Assessment:     A negative result significantly reduces the likelihood that Pastor Cornejo has a hereditary cancer syndrome  However, this testing is unable to completely rule out the presence of hereditary cancer  It remains possible that:  - There is a variant in an area of a gene which was not tested or there is a variant not detectable due to technical limitations of this this test      - There is a variant in another gene that was not included in this test or in a gene not known to be linked to cancer or tumors  - A family member has a genetic variant that the patient did not inherit  - The cancer in the family is sporadic and is related to non-hereditary factors  Recommendations:  Recommendations for Pastor Cornejo are outlined below however the surveillance and medical management should continue as clinically indicated and as determined appropriate by her healthcare providers      Breast Cancer Screening:  -Follow the surveillance and medical management recommendations made by treating healthcare providers regarding personal history of breast cancer  Colon Cancer Screening:   -Population-based screening guidelines are appropriate  Gynecologic Cancer Screening/Risk Reduction  -Routine gynecologic screening   -There are no standard screening recommendations for endometrial or ovarian cancer  Fransisco Reji was strongly encouraged to contact us regarding any changes in her personal or family history of cancer as these changes could alter our recommendation regarding genetic testing and/or cancer screening

## 2020-02-27 ENCOUNTER — ANESTHESIA EVENT (OUTPATIENT)
Dept: PERIOP | Facility: HOSPITAL | Age: 65
End: 2020-02-27
Payer: MEDICARE

## 2020-02-28 ENCOUNTER — ANESTHESIA (OUTPATIENT)
Dept: PERIOP | Facility: HOSPITAL | Age: 65
End: 2020-02-28
Payer: MEDICARE

## 2020-02-28 ENCOUNTER — HOSPITAL ENCOUNTER (OUTPATIENT)
Dept: MAMMOGRAPHY | Facility: HOSPITAL | Age: 65
Discharge: HOME | End: 2020-02-28
Attending: SURGERY
Payer: MEDICARE

## 2020-02-28 ENCOUNTER — APPOINTMENT (OUTPATIENT)
Dept: MAMMOGRAPHY | Facility: HOSPITAL | Age: 65
End: 2020-02-28
Payer: MEDICARE

## 2020-02-28 ENCOUNTER — HOSPITAL ENCOUNTER (OUTPATIENT)
Facility: HOSPITAL | Age: 65
Setting detail: OUTPATIENT SURGERY
Discharge: HOME | End: 2020-02-28
Attending: SURGERY | Admitting: SURGERY
Payer: MEDICARE

## 2020-02-28 ENCOUNTER — HOSPITAL ENCOUNTER (OUTPATIENT)
Dept: NUCLEAR MEDICINE | Facility: HOSPITAL | Age: 65
Discharge: HOME | End: 2020-02-28
Attending: SURGERY
Payer: MEDICARE

## 2020-02-28 VITALS
WEIGHT: 173 LBS | TEMPERATURE: 98.4 F | RESPIRATION RATE: 18 BRPM | DIASTOLIC BLOOD PRESSURE: 84 MMHG | SYSTOLIC BLOOD PRESSURE: 127 MMHG | HEIGHT: 65 IN | BODY MASS INDEX: 28.82 KG/M2 | OXYGEN SATURATION: 94 % | HEART RATE: 93 BPM

## 2020-02-28 VITALS — BODY MASS INDEX: 28.82 KG/M2 | WEIGHT: 173 LBS | HEIGHT: 65 IN

## 2020-02-28 DIAGNOSIS — C50.412 MALIGNANT NEOPLASM OF UPPER-OUTER QUADRANT OF LEFT BREAST IN FEMALE, ESTROGEN RECEPTOR POSITIVE (HCC): ICD-10-CM

## 2020-02-28 DIAGNOSIS — Z17.0 MALIGNANT NEOPLASM OF UPPER-OUTER QUADRANT OF LEFT BREAST IN FEMALE, ESTROGEN RECEPTOR POSITIVE (HCC): ICD-10-CM

## 2020-02-28 LAB — GLUCOSE SERPL-MCNC: 201 MG/DL (ref 65–140)

## 2020-02-28 PROCEDURE — 38792 RA TRACER ID OF SENTINL NODE: CPT | Performed by: SURGERY

## 2020-02-28 PROCEDURE — 82948 REAGENT STRIP/BLOOD GLUCOSE: CPT

## 2020-02-28 PROCEDURE — 19301 PARTIAL MASTECTOMY: CPT | Performed by: SURGERY

## 2020-02-28 PROCEDURE — A9541 TC99M SULFUR COLLOID: HCPCS

## 2020-02-28 PROCEDURE — NC001 PR NO CHARGE: Performed by: SURGERY

## 2020-02-28 PROCEDURE — 38792 RA TRACER ID OF SENTINL NODE: CPT

## 2020-02-28 PROCEDURE — 19281 PERQ DEVICE BREAST 1ST IMAG: CPT

## 2020-02-28 PROCEDURE — 88307 TISSUE EXAM BY PATHOLOGIST: CPT | Performed by: PATHOLOGY

## 2020-02-28 PROCEDURE — 38900 IO MAP OF SENT LYMPH NODE: CPT | Performed by: SURGERY

## 2020-02-28 PROCEDURE — 19301 PARTIAL MASTECTOMY: CPT | Performed by: PHYSICIAN ASSISTANT

## 2020-02-28 PROCEDURE — 38525 BIOPSY/REMOVAL LYMPH NODES: CPT | Performed by: SURGERY

## 2020-02-28 RX ORDER — DIPHENHYDRAMINE HYDROCHLORIDE 50 MG/ML
12.5 INJECTION INTRAMUSCULAR; INTRAVENOUS ONCE AS NEEDED
Status: DISCONTINUED | OUTPATIENT
Start: 2020-02-28 | End: 2020-02-28 | Stop reason: HOSPADM

## 2020-02-28 RX ORDER — EPHEDRINE SULFATE 50 MG/ML
INJECTION INTRAVENOUS AS NEEDED
Status: DISCONTINUED | OUTPATIENT
Start: 2020-02-28 | End: 2020-02-28 | Stop reason: SURG

## 2020-02-28 RX ORDER — FENTANYL CITRATE 50 UG/ML
INJECTION, SOLUTION INTRAMUSCULAR; INTRAVENOUS AS NEEDED
Status: DISCONTINUED | OUTPATIENT
Start: 2020-02-28 | End: 2020-02-28 | Stop reason: SURG

## 2020-02-28 RX ORDER — GLYCOPYRROLATE 0.2 MG/ML
INJECTION INTRAMUSCULAR; INTRAVENOUS AS NEEDED
Status: DISCONTINUED | OUTPATIENT
Start: 2020-02-28 | End: 2020-02-28 | Stop reason: SURG

## 2020-02-28 RX ORDER — CLINDAMYCIN PHOSPHATE 900 MG/50ML
900 INJECTION INTRAVENOUS ONCE
Status: COMPLETED | OUTPATIENT
Start: 2020-02-28 | End: 2020-02-28

## 2020-02-28 RX ORDER — ONDANSETRON 2 MG/ML
INJECTION INTRAMUSCULAR; INTRAVENOUS AS NEEDED
Status: DISCONTINUED | OUTPATIENT
Start: 2020-02-28 | End: 2020-02-28 | Stop reason: SURG

## 2020-02-28 RX ORDER — LIDOCAINE HYDROCHLORIDE 10 MG/ML
5 INJECTION, SOLUTION EPIDURAL; INFILTRATION; INTRACAUDAL; PERINEURAL ONCE
Status: COMPLETED | OUTPATIENT
Start: 2020-02-28 | End: 2020-02-28

## 2020-02-28 RX ORDER — FENTANYL CITRATE/PF 50 MCG/ML
25 SYRINGE (ML) INJECTION
Status: DISCONTINUED | OUTPATIENT
Start: 2020-02-28 | End: 2020-02-28 | Stop reason: HOSPADM

## 2020-02-28 RX ORDER — SODIUM CHLORIDE, SODIUM LACTATE, POTASSIUM CHLORIDE, CALCIUM CHLORIDE 600; 310; 30; 20 MG/100ML; MG/100ML; MG/100ML; MG/100ML
125 INJECTION, SOLUTION INTRAVENOUS CONTINUOUS
Status: DISCONTINUED | OUTPATIENT
Start: 2020-02-28 | End: 2020-02-28 | Stop reason: HOSPADM

## 2020-02-28 RX ORDER — HYDROMORPHONE HCL/PF 1 MG/ML
0.5 SYRINGE (ML) INJECTION
Status: DISCONTINUED | OUTPATIENT
Start: 2020-02-28 | End: 2020-02-28 | Stop reason: HOSPADM

## 2020-02-28 RX ORDER — OXYCODONE HYDROCHLORIDE AND ACETAMINOPHEN 5; 325 MG/1; MG/1
1 TABLET ORAL EVERY 4 HOURS PRN
Status: DISCONTINUED | OUTPATIENT
Start: 2020-02-28 | End: 2020-02-28 | Stop reason: HOSPADM

## 2020-02-28 RX ORDER — SODIUM CHLORIDE, SODIUM LACTATE, POTASSIUM CHLORIDE, CALCIUM CHLORIDE 600; 310; 30; 20 MG/100ML; MG/100ML; MG/100ML; MG/100ML
INJECTION, SOLUTION INTRAVENOUS CONTINUOUS PRN
Status: DISCONTINUED | OUTPATIENT
Start: 2020-02-28 | End: 2020-02-28

## 2020-02-28 RX ORDER — MAGNESIUM HYDROXIDE 1200 MG/15ML
LIQUID ORAL AS NEEDED
Status: DISCONTINUED | OUTPATIENT
Start: 2020-02-28 | End: 2020-02-28 | Stop reason: HOSPADM

## 2020-02-28 RX ORDER — BUPIVACAINE HYDROCHLORIDE 2.5 MG/ML
INJECTION, SOLUTION EPIDURAL; INFILTRATION; INTRACAUDAL AS NEEDED
Status: DISCONTINUED | OUTPATIENT
Start: 2020-02-28 | End: 2020-02-28 | Stop reason: HOSPADM

## 2020-02-28 RX ORDER — MIDAZOLAM HYDROCHLORIDE 2 MG/2ML
INJECTION, SOLUTION INTRAMUSCULAR; INTRAVENOUS AS NEEDED
Status: DISCONTINUED | OUTPATIENT
Start: 2020-02-28 | End: 2020-02-28 | Stop reason: SURG

## 2020-02-28 RX ORDER — DEXAMETHASONE SODIUM PHOSPHATE 10 MG/ML
INJECTION, SOLUTION INTRAMUSCULAR; INTRAVENOUS AS NEEDED
Status: DISCONTINUED | OUTPATIENT
Start: 2020-02-28 | End: 2020-02-28 | Stop reason: SURG

## 2020-02-28 RX ORDER — PROPOFOL 10 MG/ML
INJECTION, EMULSION INTRAVENOUS AS NEEDED
Status: DISCONTINUED | OUTPATIENT
Start: 2020-02-28 | End: 2020-02-28 | Stop reason: SURG

## 2020-02-28 RX ORDER — LIDOCAINE HYDROCHLORIDE 10 MG/ML
INJECTION, SOLUTION EPIDURAL; INFILTRATION; INTRACAUDAL; PERINEURAL AS NEEDED
Status: DISCONTINUED | OUTPATIENT
Start: 2020-02-28 | End: 2020-02-28 | Stop reason: SURG

## 2020-02-28 RX ORDER — ONDANSETRON 2 MG/ML
4 INJECTION INTRAMUSCULAR; INTRAVENOUS ONCE AS NEEDED
Status: DISCONTINUED | OUTPATIENT
Start: 2020-02-28 | End: 2020-02-28 | Stop reason: HOSPADM

## 2020-02-28 RX ADMIN — ONDANSETRON 4 MG: 2 INJECTION INTRAMUSCULAR; INTRAVENOUS at 11:20

## 2020-02-28 RX ADMIN — EPHEDRINE SULFATE 15 MG: 50 INJECTION, SOLUTION INTRAVENOUS at 12:02

## 2020-02-28 RX ADMIN — FENTANYL CITRATE 25 MCG: 50 INJECTION INTRAMUSCULAR; INTRAVENOUS at 11:38

## 2020-02-28 RX ADMIN — SODIUM CHLORIDE, SODIUM LACTATE, POTASSIUM CHLORIDE, AND CALCIUM CHLORIDE: .6; .31; .03; .02 INJECTION, SOLUTION INTRAVENOUS at 12:28

## 2020-02-28 RX ADMIN — PROPOFOL 50 MG: 10 INJECTION, EMULSION INTRAVENOUS at 11:21

## 2020-02-28 RX ADMIN — DEXAMETHASONE SODIUM PHOSPHATE 4 MG: 10 INJECTION, SOLUTION INTRAMUSCULAR; INTRAVENOUS at 11:20

## 2020-02-28 RX ADMIN — SODIUM CHLORIDE, SODIUM LACTATE, POTASSIUM CHLORIDE, AND CALCIUM CHLORIDE: .6; .31; .03; .02 INJECTION, SOLUTION INTRAVENOUS at 11:07

## 2020-02-28 RX ADMIN — PHENYLEPHRINE HYDROCHLORIDE 100 MCG: 10 INJECTION INTRAVENOUS at 11:38

## 2020-02-28 RX ADMIN — PROPOFOL 150 MG: 10 INJECTION, EMULSION INTRAVENOUS at 11:20

## 2020-02-28 RX ADMIN — LIDOCAINE HYDROCHLORIDE 5 ML: 10 INJECTION, SOLUTION EPIDURAL; INFILTRATION; INTRACAUDAL; PERINEURAL at 09:28

## 2020-02-28 RX ADMIN — GLYCOPYRROLATE 0.2 MG: 0.2 INJECTION, SOLUTION INTRAMUSCULAR; INTRAVENOUS at 12:01

## 2020-02-28 RX ADMIN — LIDOCAINE HYDROCHLORIDE 50 MG: 10 INJECTION, SOLUTION EPIDURAL; INFILTRATION; INTRACAUDAL; PERINEURAL at 11:20

## 2020-02-28 RX ADMIN — CLINDAMYCIN PHOSPHATE 900 MG: 18 INJECTION, SOLUTION INTRAMUSCULAR; INTRAVENOUS at 11:23

## 2020-02-28 RX ADMIN — MIDAZOLAM HYDROCHLORIDE 2 MG: 1 INJECTION, SOLUTION INTRAMUSCULAR; INTRAVENOUS at 11:09

## 2020-02-28 RX ADMIN — EPHEDRINE SULFATE 5 MG: 50 INJECTION, SOLUTION INTRAVENOUS at 11:59

## 2020-02-28 RX ADMIN — FENTANYL CITRATE 25 MCG: 50 INJECTION INTRAMUSCULAR; INTRAVENOUS at 12:26

## 2020-02-28 RX ADMIN — FENTANYL CITRATE 25 MCG: 50 INJECTION INTRAMUSCULAR; INTRAVENOUS at 11:20

## 2020-02-28 RX ADMIN — FENTANYL CITRATE 25 MCG: 50 INJECTION INTRAMUSCULAR; INTRAVENOUS at 11:49

## 2020-02-28 NOTE — OP NOTE
OPERATIVE REPORT  PATIENT NAME: Luis F Francis    :  1955  MRN: [id number]  Pt Location: AN OR ROOM 02    SURGERY DATE: 2020    Surgeon(s) and Role:     * Alyce Winters MD - Primary     * Marisela Fletcher PA-C - Assisting    Preop Diagnosis:  Malignant neoplasm of upper-outer quadrant of left breast in female, estrogen receptor positive ([facility] ) [C50 412, Z17 0]    Post-Op Diagnosis Codes:     * Malignant neoplasm of upper-outer quadrant of left breast in female, estrogen receptor positive ([facility] ) [C50 412, Z17 0]    Procedure(s) (LRB):  BREAST NEEDLE LOCALIZED LUMPECTOMY (NEEDLE LOC AT 0900) (Left)  SENTINEL LYMPH NODE BIOPSY; LYMPHATIC MAPPING WITH BLUE DYE AND RADIOACTIVE DYE (INJECT AT 1100 BY DR BENOIT IN THE OR) (Left)    Specimen(s):  ID Type Source Tests Collected by Time Destination   1 : Final lateral margin red Tissue Breast, Left TISSUE Joaquin Ferrera MD 2020 1144    2 : Left breast lumpectomy marked per protocol Tissue Breast, Left TISSUE Joaquin Ferrera MD 2020 1153    3 : Edwards lymph node left axilla Tissue Lymph Node, Edwards TISSUE EXAM Alyce Winters MD 2020 1201    4 : medial margin yellow Tissue Breast, Left TISSUE EXAM Alyce Winters MD 2020 1204    5 : superior margin orange Tissue Breast, Left TISSUE Joaquin Ferrera MD 2020 1205    6 : inferior margin blue Tissue Breast, Left TISSUE Joaquin Ferrera MD 2020 1205    7 : anterior margin green Tissue Breast, Left TISSUE Joaquin Ferrera MD 2020 1207    8 : posterior margin black Tissue Breast, Left TISSUE EXAM Alyce Winters MD 2020 1207        Estimated Blood Loss:   Minimal    Drains:  * No LDAs found *    Anesthesia Type:   General    Operative Indications:  Malignant neoplasm of upper-outer quadrant of left breast in female, estrogen receptor positive ([facility] ) [C50 412, Z17 0]      Operative Findings:  Good specimen radiograph, sentinel lymph node was blue and radioactive enlarged but appeared to be fatty replaced    Complications:   None    Procedure and Technique:  Lumpectomy  The patient was brought to the operation room and placed under general anesthesia  Attention was paid to ensure appropriate padding and correct positioning  The left breast was injected intradermally with technetium sulfur colloid followed by 0 3cc of methylene blue  These areas were vigorously massaged to facilitate identification of the sentinel lymph node (SLN)  The breast and axillary region were then prepped and draped in a sterile fashion  I initiated a time-out, identifying the patient, the correct side and the above procedure  All parties agreed with the time out  The planned incision was then injected with 0 25% Marcaine for local anesthesia  The incision was sharply incised encompassing the blue dye  The localizing wire was used to guide the dissection  Superior, inferior, medial and lateral planes were developed around the localizing wire and the specimen truncated posteriorly with electrocautery just beyond the tip of the wire  The specimen was then inked for orientation purposes  A specimen radiograph was taken in two dimensions  Additional margins were removed to optimize complete removal of the tumor  The additional margins were inked on the most distal area  a lateral is skin, final medial was muscle  All specimens were sent to pathology for permanent analysis  Superficial bleeding controlled with electrocautery and the wound was extensively irrigated  The wound was closed with two layers, an inner layer of 3-0 vicryl and a subcuticular layer of 4-0 monocryl  Steri-strips were applied  SLN Biopsy  The previously marked location of the sentinel lymph node was injected with 0 25% Marcaine  A curivilinear incision was made and dissection was taken down into the axillar proper with electrocautery  The mau counter was used to help localize the SLN    The sentinel lymph node was identified and removed with electrocautery  SLN Findings  The SLN was blue and radioactive  The lymph node was slightly enlarged but appeared to be fatty replaced and it was sent for permanent pathologic analysis  I was present for the entire procedure, no resident was available for the case the advanced practitioner was used to facilitate retraction particular for a deep sentinel lymph node      Patient Disposition:  PACU     SIGNATURE: Krysten Noriega MD  DATE: February 28, 2020  TIME: 12:11 PM

## 2020-02-28 NOTE — ANESTHESIA PREPROCEDURE EVALUATION
Review of Systems/Medical History  Patient summary reviewed  Chart reviewed  No history of anesthetic complications     Cardiovascular  Exercise tolerance (METS): >4,  Hypertension controlled,   Comment: PE 2011, only on ASA  , PE,  Pulmonary  Negative pulmonary ROS        GI/Hepatic  Negative GI/hepatic ROS     Comment: Pancreatitis c/b PE, Mucocuanteous fistual      Negative  ROS        Endo/Other  Diabetes well controlled type 2 Insulin,   Comment: Insulin pump at basal rate (0 95-1u/hour)  GYN  Negative gynecology ROS   Breast cancer        Hematology   Musculoskeletal       Neurology  Negative neurology ROS      Psychology   Negative psychology ROS              Physical Exam    Airway    Mallampati score: II  TM Distance: >3 FB  Neck ROM: full     Dental   No notable dental hx     Cardiovascular  Rhythm: regular, Rate: normal, Cardiovascular exam normal    Pulmonary  Pulmonary exam normal Breath sounds clear to auscultation,     Other Findings        Anesthesia Plan  ASA Score- 2     Anesthesia Type- general with ASA Monitors  Additional Monitors:   Airway Plan: LMA  Plan Factors-  Patient did not smoke on day of surgery  Induction- intravenous  Postoperative Plan- Plan for postoperative opioid use  Informed Consent- Anesthetic plan and risks discussed with patient  I personally reviewed this patient with the CRNA  Discussed and agreed on the Anesthesia Plan with the CRNA  Juan Scruggs

## 2020-02-28 NOTE — INTERVAL H&P NOTE
H&P reviewed  After examining the patient I find no changes in the patients condition since the H&P had been written      Vitals:    02/28/20 0823   BP: (!) 202/93   Pulse: (!) 114   Resp: 20   Temp: (!) 97 2 °F (36 2 °C)   SpO2: 98%

## 2020-02-28 NOTE — ANESTHESIA POSTPROCEDURE EVALUATION
Post-Op Assessment Note    CV Status:  Stable  Pain Score: 0    Pain management: adequate     Mental Status:  Awake and alert   Hydration Status:  Stable   PONV Controlled:  None   Airway Patency:  Patent   Post Op Vitals Reviewed: Yes      Staff: CRNA   Comments: Patient resting in PACU, airway patent, VSS  No c/o pain or nausea             BP  147/75 (105)   Temp  98 4   Pulse  99   Resp  10   SpO2   100% on FM

## 2020-02-28 NOTE — DISCHARGE INSTRUCTIONS
POST-OPERATIVE BREAST CARE INSTRUCTIONS    Wound Closure/Dressing: Your wound is closed with:   Steri strips-white pieces of tape that hold your incision together along with surgical glue           Dissolvable sutures-underneath the skin    Wound care:     If you have gauze over your wound you may remove it the day after surgery  Leave the Steri-strips on, they will fall off on their own in 1-2 weeks   You may shower using soap and water to clean your wound  Gently pat dry  Drain Care: If you do not have a drain, disregard this section   Visiting Nursing should have been arranged upon discharge from hospital   A nurse will call your home to schedule an initial visit  Please call the number below if you have not received a call within 48 hours of hospital discharge   You may shower with the PEYMAN drains  Wash area around the drains with soap and water and pat dry   Record drain outputs on chart given to you at pre op visit and bring that chart to office at post op appt   PEYMAN drains can be removed in the office by a nurse either at post op visit OR when drain output is 30 ccs or less in a 24 hour period for three days in a row   If you had plastic surgery or reconstructive surgery, the plastic surgeon will manage the drains  Other:       You can begin wearing your own bra when it feels comfortable   You may resume using deodorant the day after surgery                 Post-op visit:  your post-op visit is scheduled for:  2020 @ 3:00 PM    Call our office at [phone] if you have any  of the followin  Redness, swelling, heat, unusual drainage or heavy bleeding from wound  2  Fever greater than 101 °  3  Pain not relieved by medication

## 2020-03-18 ENCOUNTER — TELEPHONE (OUTPATIENT)
Dept: SURGICAL ONCOLOGY | Facility: CLINIC | Age: 65
End: 2020-03-18

## 2020-03-18 NOTE — TELEPHONE ENCOUNTER
Contacted patient regarding their appointment tomorrow  Patient would like to keep their appointment  Explained that [address] is working to keep all of our patients safe during this time and that we need to ask them pre-screening questions  Patient answered NO to all screening questions  Informed patient that [address] has implanted a new policy that allows them to bring only 1 other person to the visit (and the visitor will have to answer the screening questions at time of appointment)  Explained that if there are any changes prior to their appointment we will be in contact

## 2020-03-19 ENCOUNTER — OFFICE VISIT (OUTPATIENT)
Dept: SURGICAL ONCOLOGY | Facility: CLINIC | Age: 65
End: 2020-03-19

## 2020-03-19 VITALS
SYSTOLIC BLOOD PRESSURE: 162 MMHG | TEMPERATURE: 98 F | WEIGHT: 173 LBS | HEIGHT: 65 IN | BODY MASS INDEX: 28.82 KG/M2 | HEART RATE: 74 BPM | DIASTOLIC BLOOD PRESSURE: 90 MMHG | RESPIRATION RATE: 14 BRPM

## 2020-03-19 DIAGNOSIS — C50.412 MALIGNANT NEOPLASM OF UPPER-OUTER QUADRANT OF LEFT BREAST IN FEMALE, ESTROGEN RECEPTOR POSITIVE (HCC): ICD-10-CM

## 2020-03-19 DIAGNOSIS — Z17.0 MALIGNANT NEOPLASM OF UPPER-OUTER QUADRANT OF LEFT BREAST IN FEMALE, ESTROGEN RECEPTOR POSITIVE (HCC): ICD-10-CM

## 2020-03-19 DIAGNOSIS — Z71.89 OTHER SPECIFIED COUNSELING: ICD-10-CM

## 2020-03-19 DIAGNOSIS — Z98.890 STATUS POST LEFT BREAST LUMPECTOMY: ICD-10-CM

## 2020-03-19 PROCEDURE — 99024 POSTOP FOLLOW-UP VISIT: CPT | Performed by: SURGERY

## 2020-03-19 NOTE — PROGRESS NOTES
Surgical Oncology Breast Post-Op       [address] [facility],[address]  [facility] [place]  [address] [facility] [address]    Ester Farrah  1955  [id number]  [address] [facility] [facility]  [facility] [place]  [address] [facility] [address]    Chief Complaint:   Jimmie Badillo is seen for a post-operative visit of her recent breast surgery  Oncology History:        Malignant neoplasm of upper-outer quadrant of left breast in female, estrogen receptor positive ([facility] [address] )    12/23/2019 Biopsy     Left breast ultrasound-guided biopsy  2 o'clock, 10 cm from nipple  Invasive mammary carcinoma of no special type  Grade 1    MO 90  HER2 1+    Concordant  Appears unifocal - difficulty assessing accurate size on mammo vs US  2 1 cm vs 1 1 cm; possible concern for adjacent mass on original US not able to be visualized on biopsy  Left axilla US negative  Right breast clear  1/17/2020 Genetic Testing     The following genes were evaluated: ALEKSANDAR, BARD1, BRCA1, BRCA2, BRIP1, CDH1, CHEK2, NBN, NF1, PALB2, PTEN, RAD50, STK11, TP53  Negative result  No pathogenic sequence variants or deletions/dupllications identified  [facility]      2/28/2020 Surgery     Left breast needle localized lumpectomy with sentinel lymph node biopsy  Invasive carcinoma of no special type  Grade 1  1 5 cm  Margins negative  0/1 Lymph node  Anatomic/Prognostic Stage IA         Assessment & Plan:   Assessment/Plan    Patient presents for postop visit  Her pathology demonstrated a 1 5 cm invasive cancer that was ER MO positive  She is node-negative her margins are negative  We have reviewed this with Dr Karl Fairbanks who is recommending only anti hormonal therapy  We will have her see Radiation Oncology  We will see her back in 3 months  She is agreeable to see our advanced practitioner at that time    History of Present Illness:   See Oncology History    Interval History:   Patient had no complaints referable to her surgery  Her wounds are healing well  Review of Systems:   Review of Systems   All other systems reviewed and are negative  Past Medical History:     Patient Active Problem List   Diagnosis    DVT of upper extremity (deep vein thrombosis) (HCC)    Type I diabetes mellitus, uncontrolled ([facility] [address] )    Encounter for gynecological examination without abnormal finding    Malignant neoplasm of upper-outer quadrant of left breast in female, estrogen receptor positive ([facility] [address] )     Past Medical History:   Diagnosis Date    BRCA gene mutation negative 01/2020    [facility]    Breast cancer ([facility] [address] )     Diabetes mellitus ([facility] [address] )     History of transfusion     2010    Hypertension     Pancreatitis     pancreatic cyst that burst    Pulmonary embolism on left [facility])     and right side     Past Surgical History:   Procedure Laterality Date    BREAST BIOPSY Left 2009    neg    BREAST BIOPSY Left 2019    positive- could not find second lump     BREAST LUMPECTOMY Left 2/28/2020    Procedure: BREAST NEEDLE LOCALIZED LUMPECTOMY (NEEDLE LOC AT 0900); Surgeon: Jacky Lange MD;  [facility];  Service: Surgical Oncology    COLONOSCOPY      FISTULA REPAIR      small intestine    ILEOSTOMY      with reversal    LYMPH NODE BIOPSY Left 2/28/2020    Procedure: SENTINEL LYMPH NODE BIOPSY; LYMPHATIC MAPPING WITH BLUE DYE AND RADIOACTIVE DYE (INJECT AT 1100 BY DR BENOIT IN THE OR);   Surgeon: Jacky Lange MD;  [facility];  Service: Surgical Oncology    MAMMO NEEDLE LOCALIZATION LEFT (ALL INC) Left 2/28/2020    TRACHEOSTOMY  2010    with repair    US GUIDED BREAST BIOPSY LEFT COMPLETE Left 12/23/2019     Family History   Problem Relation Age of Onset    Breast cancer Mother 43    Cervical cancer Mother 58    Breast cancer Sister 61    No Known Problems Maternal Grandmother     No Known Problems Paternal Grandmother     No Known Problems Maternal Aunt     Bone cancer Paternal Aunt 37  No Known Problems Paternal Aunt     Cancer Father         Bladder     Social History     Socioeconomic History    Marital status: /Civil Union     Spouse name: Not on file    Number of children: Not on file    Years of education: Not on file    Highest education level: Not on file   Occupational History    Not on file   Social Needs    Financial resource strain: Not on file    Food insecurity:     Worry: Not on file     Inability: Not on file    Transportation needs:     Medical: Not on file     Non-medical: Not on file   Tobacco Use    Smoking status: Never Smoker    Smokeless tobacco: Never Used   Substance and Sexual Activity    Alcohol use: No    Drug use: No    Sexual activity: Not Currently     Birth control/protection: Post-menopausal   Lifestyle    Physical activity:     Days per week: Not on file     Minutes per session: Not on file    Stress: Not on file   Relationships    Social connections:     Talks on phone: Not on file     Gets together: Not on file     Attends Christian service: Not on file     Active member of club or organization: Not on file     Attends meetings of clubs or organizations: Not on file     Relationship status: Not on file    Intimate partner violence:     Fear of current or ex partner: Not on file     Emotionally abused: Not on file     Physically abused: Not on file     Forced sexual activity: Not on file   Other Topics Concern    Not on file   Social History Narrative    Not on file       Current Outpatient Medications:     Aspirin 81 MG EC tablet, Take by mouth, Disp: , Rfl:     Cholecalciferol (VITAMIN D3 PO), Take by mouth, Disp: , Rfl:     Cyanocobalamin (VITAMIN B 12) 100 MCG LOZG, Take by mouth, Disp: , Rfl:     docusate sodium (COLACE) 100 mg capsule, Take 100 mg by mouth 2 (two) times a day, Disp: , Rfl:     fexofenadine (ALLEGRA) 60 MG tablet, Take by mouth, Disp: , Rfl:     hydrocortisone 0 5 % cream, Apply topically 2 (two) times a day, Disp: , Rfl:     Insulin Infusion Pump KIT, by Does not apply route , Disp: , Rfl:     LOSARTAN POTASSIUM PO, Take 50 mg by mouth, Disp: , Rfl:     NOVOLOG 100 UNIT/ML injection, , Disp: , Rfl:     Omega-3 Fatty Acids (FISH OIL) 1,000 mg, Take 1,000 mg by mouth, Disp: , Rfl:     Specialty Vitamins Products (MARTI-RX DIABETIC VITAMIN PO), Take by mouth daily, Disp: , Rfl:     oxyCODONE-acetaminophen (PERCOCET) 5-325 mg per tablet, Take 1 tablet by mouth every 6 (six) hours as needed for moderate painMax Daily Amount: 4 tablets (Patient not taking: Reported on 3/19/2020), Disp: 6 tablet, Rfl: 0  Allergies   Allergen Reactions    Penicillins Hives    Ampicillin Rash    Sulfa Antibiotics Fatigue       Physical Exams:     Vitals:    03/19/20 1229   BP: 162/90   Pulse: 74   Resp: 14   Temp: 98 °F (36 7 °C)     Physical Exam   Pulmonary/Chest:   Examination of the left breast demonstrates 2 well-healed incisions  I removed the Steri-Strips from the breast they have already come off the axillary incision  There is no evidence of infection hematoma or seroma  Results & Discussion:   I provided the patient a copy of her pathology results I reviewed it with her  I recommended radiation therapy as well as anti hormonal therapy as outlined above  Will coordinate these appointments for her  We will see her back in 3 months  I discussed radiation therapy as well as the types and time required  Also reviewed anti hormonal therapy as well as its indications  We also talked about chemotherapy though after reviewing this with Dr Brittney Beach he would not offer this  All questions were answered the patient's satisfaction  I have spent 15 minutes with Patient and family today in which greater than 50% of this time was spent in counseling/coordination of care regarding Diagnostic results, methods of RT and side effects of anti-hormonal therapy

## 2020-03-24 ENCOUNTER — TELEPHONE (OUTPATIENT)
Dept: RADIATION ONCOLOGY | Facility: CLINIC | Age: 65
End: 2020-03-24

## 2020-03-24 NOTE — TELEPHONE ENCOUNTER
Radiation Oncology Consult 3/25/20 COVID-19 HEALTH SCREENING COMPLETED WITH PATIENT, VISITOR POLICY REVIEWED   KD

## 2020-03-25 ENCOUNTER — RADIATION ONCOLOGY CONSULT (OUTPATIENT)
Dept: RADIATION ONCOLOGY | Facility: CLINIC | Age: 65
End: 2020-03-25
Attending: RADIOLOGY
Payer: MEDICARE

## 2020-03-25 VITALS
SYSTOLIC BLOOD PRESSURE: 164 MMHG | RESPIRATION RATE: 18 BRPM | TEMPERATURE: 97.8 F | BODY MASS INDEX: 29.07 KG/M2 | DIASTOLIC BLOOD PRESSURE: 84 MMHG | WEIGHT: 174.5 LBS | HEIGHT: 65 IN | HEART RATE: 84 BPM

## 2020-03-25 DIAGNOSIS — C50.412 MALIGNANT NEOPLASM OF UPPER-OUTER QUADRANT OF LEFT BREAST IN FEMALE, ESTROGEN RECEPTOR POSITIVE (HCC): Primary | ICD-10-CM

## 2020-03-25 DIAGNOSIS — Z17.0 MALIGNANT NEOPLASM OF UPPER-OUTER QUADRANT OF LEFT BREAST IN FEMALE, ESTROGEN RECEPTOR POSITIVE (HCC): Primary | ICD-10-CM

## 2020-03-25 PROCEDURE — 99211 OFF/OP EST MAY X REQ PHY/QHP: CPT | Performed by: RADIOLOGY

## 2020-03-25 NOTE — PROGRESS NOTES
Sapna Johnson 1955 is a 77 y o  female    Oncology History    Family medical history includes breast cancer in mother and breast cancer in sister  12/12/19 Bilateral screening mammogram  FINDINGS:   LEFT  1) ARCHITECTURAL DISTORTION: There is architectural distortion seen in the upper outer quadrant of the left breast in the posterior depth       BILATERAL  2) CALCIFICATIONS: There are round calcifications in a diffuse distribution seen in both breasts  Compared to the previous study, there are no significant changes  12/19/19 Left diagnostic mammogram with ultrasound  FINDINGS:   LEFT  1) Mass  Mammogram:  There is architectural distortion in the upper outer quadrant of the left breast in the posterior depth  Limited ultrasound: There is an irregular hypoechoic shadowing mass at 2 o'clock, 10 cm from the nipple  This corresponds with the area of architectural distortion seen on the mammogram   The mass measures 11 x 10 x 7 mm  This is suspicious and ultrasound-guided core needle biopsy is recommended  2) Mass  On ultrasound evaluation of the upper outer quadrant, a 2nd mass is seen at 1 o'clock, 10 cm from the nipple  This mass measures 8 x 6 x 6 mm  These 2 masses are  by approximately 1 6 cm in the antiradial plane  However, the margins are very poorly defined and it is difficult to determine if these findings represent 2 distinct masses or 1 irregular mass  Biopsy of both sites is recommended  Axilla: The lymph nodes appear somewhat prominent and contain both echogenic and hypoechoic fat  However, on real-time scanning, there are no areas of cortical thickening  This was compared with the contralateral axilla and the lymph nodes are similar appearing  No definite abnormal lymph nodes were demonstrated in the left axilla      12/23/19 Breast biopsy  Left breast, 2:00 position, 10 cm from nipple (core needle biopsy):  - Invasive mammary carcinoma of no special type (ductal, not otherwise specified)  - [place] grade: Score 3 (of 9) grade 1     - Invasive carcinoma involves two (2) of five (5) cores [6mm greatest dimension]  - 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  % positive, OH 90% positive  HER2 negative    1/16/20 Mona Liu MD consultation  MRI and genetic testing recommended    1/19/20 Bilateral breast MRI  Right breast:  No MRI evidence of malignancy      Left breast:    1  Moderate enhancement in the biopsy proven malignancy in the upper outer quadrant  2  Faint non mass enhancement extending inferior/posterior from the malignancy is nonspecific in appearance  It may be reasonable to include this within the lumpectomy specimen (see comments above)     1/27/20 Notified of her genetic testing results, which were negative    1/30/20 Dr Ronaldo Castleman   MRI demonstrated a possible 2nd lesion adjacent to the 1st and could be resected in a single resection  Plan lumpectomy with sentinel node biopsy    2/28/20 Left lumpectomy with sentinel node biopsy   Breast, left, lumpectomy:  -  Invasive mammary carcinoma of no special type (ductal, not otherwise specified)  -  Ductal carcinoma in situ, low grade, cribriform type  -  Background biopsy site and fibrocystic changes present  Upper outer quadrant  Grade 1  Invasive Carcinoma Margins  Uninvolved by invasive carcinoma   DCIS Margins  Uninvolved by DCIS   Greatest dimension of largest invasive focus (Millimeters): 15 mm     C  Lymph node, left axillary sentinel, excision:  -  One benign lymph node, negative for metastatic carcinoma  3/19/20 Mona Liu MD   reviewed pathology with Dr Reena Art who is recommending only anti hormonal therapy   She will see Radiation Oncology    4/22/20 Dr Reena Art [facility]        Malignant neoplasm of upper-outer quadrant of left breast in female, estrogen receptor positive ([facility] [address] )    12/23/2019 Biopsy     Left breast ultrasound-guided biopsy  2 o'clock, 10 cm from nipple  Invasive mammary carcinoma of no special type  Grade 1    CT 90  HER2 1+    Concordant  Appears unifocal - difficulty assessing accurate size on mammo vs US  2 1 cm vs 1 1 cm; possible concern for adjacent mass on original US not able to be visualized on biopsy  Left axilla US negative  Right breast clear  2020 Genetic Testing     The following genes were evaluated: ALEKSANDAR, BARD1, BRCA1, BRCA2, BRIP1, CDH1, CHEK2, NBN, NF1, PALB2, PTEN, RAD50, STK11, TP53  Negative result  No pathogenic sequence variants or deletions/dupllications identified  [facility]      2020 Surgery     Left breast needle localized lumpectomy with sentinel lymph node biopsy  Invasive carcinoma of no special type  Grade 1  1 5 cm  Margins negative  0/1 Lymph node  Anatomic/Prognostic Stage IA       Clinical Trial: no    OB/GYN History:  The patient underwent menarche at 4 years  Menopause Status Pre, Lizette, Unknown and No Answer  No LMP recorded (lmp unknown)  Patient is postmenopausal   Menopause at 46 years  Menopause Reason natural  Hormone replacement therapy: no      3   Para 3   Age at first delivery being 21 years       Health Maintenance   Topic Date Due    Hepatitis C Screening  1955    Depression Screening PHQ  1955    Medicare Annual Wellness Visit (AWV)  1955    CRC Screening: Colonoscopy  1955    Diabetic Foot Exam  1963    DM Eye Exam  1963    BMI: Followup Plan  1971    Fall Risk  2018    Pneumococcal Vaccine: 65+ Years (1 of 2 - PCV13) 2018    HEMOGLOBIN A1C  2020    MAMMOGRAM  2020    BMI: Adult  2021    DTaP,Tdap,and Td Vaccines (2 - Td) 2024    Influenza Vaccine  Completed    Pneumococcal Vaccine: Pediatrics (0 to 5 Years) and At-Risk Patients (6 to 59 Years)  Aged Out    HIB Vaccine  Aged Out    Hepatitis B Vaccine  Aged Out    IPV Vaccine  Aged Out    Hepatitis A Vaccine  Aged Out    Meningococcal ACWY Vaccine  Aged Out    HPV Vaccine  Aged Out       Past Medical History:   Diagnosis Date    BRCA gene mutation negative 01/2020    [facility]    [facility] cancer ([facility] [address] )     Diabetes mellitus ([facility] [address] )     History of transfusion     2010    Hypertension     Pancreatitis     pancreatic cyst that burst    Pulmonary embolism on left [facility])     and right side       Past Surgical History:   Procedure Laterality Date    BREAST BIOPSY Left 2009    neg    BREAST BIOPSY Left 2019    positive- could not find second lump     BREAST LUMPECTOMY Left 2/28/2020    Procedure: BREAST NEEDLE LOCALIZED LUMPECTOMY (NEEDLE LOC AT 0900); Surgeon: Krysten Noriega MD;  [facility];  Service: Surgical Oncology    COLONOSCOPY      FISTULA REPAIR      small intestine    ILEOSTOMY      with reversal    LYMPH NODE BIOPSY Left 2/28/2020    Procedure: SENTINEL LYMPH NODE BIOPSY; LYMPHATIC MAPPING WITH BLUE DYE AND RADIOACTIVE DYE (INJECT AT 1100 BY DR BENOIT IN THE OR);   Surgeon: Krysten Noriega MD;  [facility];  Service: Surgical Oncology    MAMMO NEEDLE LOCALIZATION LEFT (ALL INC) Left 2/28/2020    TRACHEOSTOMY  2010    with repair    US GUIDED BREAST BIOPSY LEFT COMPLETE Left 12/23/2019       Family History   Problem Relation Age of Onset    Breast cancer Mother 43    Cervical cancer Mother 58    Breast cancer Sister 61    No Known Problems Maternal Grandmother     No Known Problems Paternal Grandmother     No Known Problems Maternal Aunt     Bone cancer Paternal Aunt 43    No Known Problems Paternal Aunt     Cancer Father         Bladder       Social History     Tobacco Use    Smoking status: Never Smoker    Smokeless tobacco: Never Used   Substance Use Topics    Alcohol use: No    Drug use: No          Current Outpatient Medications:     Aspirin 81 MG EC tablet, Take by mouth, Disp: , Rfl:     Cholecalciferol (VITAMIN D3 PO), Take by mouth, Disp: , Rfl:     Cyanocobalamin (VITAMIN B 12) 100 MCG LOZG, Take by mouth, Disp: , Rfl:     docusate sodium (COLACE) 100 mg capsule, Take 100 mg by mouth 2 (two) times a day, Disp: , Rfl:     fexofenadine (ALLEGRA) 60 MG tablet, Take by mouth, Disp: , Rfl:     hydrocortisone 0 5 % cream, Apply topically 2 (two) times a day, Disp: , Rfl:     Insulin Infusion Pump KIT, by Does not apply route , Disp: , Rfl:     LOSARTAN POTASSIUM PO, Take 50 mg by mouth, Disp: , Rfl:     NOVOLOG 100 UNIT/ML injection, , Disp: , Rfl:     Omega-3 Fatty Acids (FISH OIL) 1,000 mg, Take 1,000 mg by mouth, Disp: , Rfl:     Specialty Vitamins Products (MARTI-RX DIABETIC VITAMIN PO), Take by mouth daily, Disp: , Rfl:     oxyCODONE-acetaminophen (PERCOCET) 5-325 mg per tablet, Take 1 tablet by mouth every 6 (six) hours as needed for moderate painMax Daily Amount: 4 tablets (Patient not taking: Reported on 3/19/2020), Disp: 6 tablet, Rfl: 0    Allergies   Allergen Reactions    Penicillins Hives    Ampicillin Rash    Sulfa Antibiotics Fatigue        Review of Systems:  Review of Systems   Constitutional: Negative  HENT: Negative  Eyes: Negative  Respiratory: Negative  Cardiovascular: Negative  Gastrointestinal: Negative  Endocrine: Negative  Genitourinary: Negative  Musculoskeletal: Negative  Skin: Negative  Allergic/Immunologic: Negative  Neurological: Negative  Hematological: Negative  Psychiatric/Behavioral: Negative  Vitals:    03/25/20 0928   BP: 164/84   Pulse: 84   Resp: 18   Temp: 97 8 °F (36 6 °C)   Weight: 79 2 kg (174 lb 8 oz)   Height: 5' 5" (1 651 m)       Pain Score: 0-No pain    Imaging:No images are attached to the encounter       Teaching Done and packet given

## 2020-03-25 NOTE — PROGRESS NOTES
Consultation - Radiation Oncology     CPA:[id number] : 1955  Encounter: [id number]  Patient Information: [address]  Chief Complaint   Patient presents with    Breast Cancer    Consult     Cancer Staging  Stage IA ([id number]), G1         History of Present Illness   René Barrera is a 77y o  year old female with past medical history significant for ruptured pancreatic cyst requiring diversion and 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 status post surgical repair and second fistula in the left abdomen who was noted with architectural distortion in the upper outer quadrant of the left breast on routine screening bilateral mammogram on 19  Of note, patient has family history significant for breast cancer in mother and sister       19 Further views confirmed architectural distortion in the upper outer quadrant of the left breast   An irregular 1 1cm hypoechoic shadowing mass at 2 o'clock corresponding with the area of architectural distortion on the mammogram   A 2nd mass was seen at 1 o'clock measuring 8mm  It is difficult to determine if these findings represent 2 distinct masses or 1 irregular mass         19 Left breast core needle biopsy at 2:00 position, 10 cm from nipple:  - Invasive mammary carcinoma of no special type (ductal, not otherwise specified)  - Anette grade: Score 3 (of 9) grade 1     - Invasive carcinoma involves two (2) of five (5) cores [6mm greatest dimension]  - 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  % positive, MO 90% positive  HER2 negative      20 Bilateral breast MRI  Right breast:  No MRI evidence of malignancy    Left breast:    1  Moderate enhancement in the biopsy proven malignancy in the upper outer quadrant     2  Faint non mass enhancement extending inferior/posterior from the malignancy is nonspecific in appearance   It may be reasonable to include this within the lumpectomy specimen        20 Genetic testing were negative      1/30/20 Dr Ladonna Gonzalez recommended resection of second lesion at time of lumpectomy with sentinel node biopsy      2/28/20 Left lumpectomy with sentinel node biopsy  Pathology demonstrated 1 5cm grade 1 invasive mammary carcinoma associated with low grade DCIS  Invasive carcinoma and DCIS margins were negative  0/1 sentinel lymph node was negative      4/22/20 Dr Tabitha Julian consultation at [facility]  Currently, plan is for adjuvant hormone therapy only  The patient states that she generally feels well  She denies any breast pain or significant tenderness  She has occasional mild incisional tenderness, but does not take any pain medications for this  She denies any new palpable masses, suspicious skin changes, or nipple discharge of the breasts bilaterally  She denies any upper extremity edema or shoulder restriction  She denies any vaginal discharge or bleeding  The patient does have a fistula in the left abdominal wall a complication from previous pancreatic pseudocyst rupture requiring surgery  This area is stable  Historical Information      Malignant neoplasm of upper-outer quadrant of left breast in female, estrogen receptor positive ([facility] [address] )    12/23/2019 Biopsy     Left breast ultrasound-guided biopsy  2 o'clock, 10 cm from nipple  Invasive mammary carcinoma of no special type  Grade 1    ME 90  HER2 1+    Concordant  Appears unifocal - difficulty assessing accurate size on mammo vs US  2 1 cm vs 1 1 cm; possible concern for adjacent mass on original US not able to be visualized on biopsy  Left axilla US negative  Right breast clear  1/17/2020 Genetic Testing     The following genes were evaluated: ALEKSANDAR, BARD1, BRCA1, BRCA2, BRIP1, CDH1, CHEK2, NBN, NF1, PALB2, PTEN, RAD50, STK11, TP53  Negative result   No pathogenic sequence variants or deletions/dupllications identified  [employer]      2/28/2020 Surgery     Left breast needle localized lumpectomy with sentinel lymph node biopsy  Invasive carcinoma of no special type  Grade 1  1 5 cm  Margins negative  0/1 Lymph node  Anatomic/Prognostic Stage IA       OB/GYN History:  The patient underwent menarche at 4 years  Patient is postmenopausal   Menopause at 46 years  Menopause Reason natural  Hormone replacement therapy: no      3 Para 3   Age at first delivery being 21 years  Status post bilateral tubal ligation      Past Medical History:   Diagnosis Date    BRCA gene mutation negative 2020    [facility]    Breast cancer ([facility] [address] )     Diabetes mellitus ([facility] [address] )     History of transfusion     2010    Hypertension     Pancreatitis     pancreatic cyst that burst    Pulmonary embolism on left [facility])     and right side     Past Surgical History:   Procedure Laterality Date    BREAST BIOPSY Left 2009    neg    BREAST BIOPSY Left 2019    positive- could not find second lump     BREAST LUMPECTOMY Left 2020    Procedure: BREAST NEEDLE LOCALIZED LUMPECTOMY (NEEDLE LOC AT 0900); Surgeon: Krysten Noriega MD;  [facility];  Service: Surgical Oncology    COLONOSCOPY      FISTULA REPAIR      small intestine    ILEOSTOMY      with reversal    LYMPH NODE BIOPSY Left 2020    Procedure: SENTINEL LYMPH NODE BIOPSY; LYMPHATIC MAPPING WITH BLUE DYE AND RADIOACTIVE DYE (INJECT AT 1100 BY DR BENOIT IN THE OR);   Surgeon: Krysten Noriega MD;  [facility];  Service: Surgical Oncology    MAMMO NEEDLE LOCALIZATION LEFT (ALL INC) Left 2020    TRACHEOSTOMY      with repair    US GUIDED BREAST BIOPSY LEFT COMPLETE Left 2019       Family History   Problem Relation Age of Onset    Breast cancer Mother 43    Cervical cancer Mother 58    Breast cancer Sister 61    No Known Problems Maternal Grandmother     No Known Problems Paternal Grandmother     No Known Problems Maternal Aunt     Bone cancer Paternal Aunt 43    No Known Problems Paternal Aunt     Cancer Father         Bladder       Social History   Social History     Substance and Sexual Activity   Alcohol Use No     Social History     Substance and Sexual Activity   Drug Use No     Social History     Tobacco Use   Smoking Status Never Smoker   Smokeless Tobacco Never Used       Meds/Allergies     Current Outpatient Medications:     Aspirin 81 MG EC tablet, Take by mouth, Disp: , Rfl:     Cholecalciferol (VITAMIN D3 PO), Take by mouth, Disp: , Rfl:     Cyanocobalamin (VITAMIN B 12) 100 MCG LOZG, Take by mouth, Disp: , Rfl:     docusate sodium (COLACE) 100 mg capsule, Take 100 mg by mouth 2 (two) times a day, Disp: , Rfl:     fexofenadine (ALLEGRA) 60 MG tablet, Take by mouth, Disp: , Rfl:     hydrocortisone 0 5 % cream, Apply topically 2 (two) times a day, Disp: , Rfl:     Insulin Infusion Pump KIT, by Does not apply route , Disp: , Rfl:     LOSARTAN POTASSIUM PO, Take 50 mg by mouth, Disp: , Rfl:     NOVOLOG 100 UNIT/ML injection, , Disp: , Rfl:     Omega-3 Fatty Acids (FISH OIL) 1,000 mg, Take 1,000 mg by mouth, Disp: , Rfl:     Specialty Vitamins Products (MARTI-RX DIABETIC VITAMIN PO), Take by mouth daily, Disp: , Rfl:     oxyCODONE-acetaminophen (PERCOCET) 5-325 mg per tablet, Take 1 tablet by mouth every 6 (six) hours as needed for moderate painMax Daily Amount: 4 tablets (Patient not taking: Reported on 3/19/2020), Disp: 6 tablet, Rfl: 0  Allergies   Allergen Reactions    Penicillins Hives    Ampicillin Rash    Sulfa Antibiotics Fatigue     Review of Systems  Constitutional: Negative  HENT: Negative  Eyes: Negative  Respiratory: Negative  Cardiovascular: Negative  Gastrointestinal: Negative  Endocrine: Negative  Genitourinary: Negative  Musculoskeletal: Negative  Skin: Negative  Allergic/Immunologic: Negative  Neurological: Negative  Hematological: Negative  Psychiatric/Behavioral: Negative          OBJECTIVE:   /84   Pulse 84   Temp 97 8 °F (36 6 °C)   Resp 18   Ht 5' 5" (1 651 m) Wt 79 2 kg (174 lb 8 oz)   LMP  (LMP Unknown)   BMI 29 04 kg/m²   Pain Assessment:  0  Performance Status: Karnofsky: 90 - Able to carry on normal activity; minor signs or symptoms of disease     Physical Exam   Constitutional: She is oriented to person, place, and time  She appears well-developed and well-nourished  No distress  HENT:   Mouth/Throat: Oropharynx is clear and moist    Eyes: No scleral icterus  Cardiovascular: Normal rate and regular rhythm  No murmur heard  Pulmonary/Chest: Effort normal  No respiratory distress  She has no wheezes  She has no rhonchi  She has no rales  Breast examination demonstrates the patient to be symmetric in contour and size  There is a well-healed lumpectomy scar in the lateral left breast with a second well healed axillary scar   There are no palpable masses or suspicious skin changes of the breasts bilaterally  Abdominal: Soft  She exhibits no distension  There is no tenderness  There is a well-healed midline scar  Adhesive bandage in the upper left abdomen  Musculoskeletal: She exhibits no edema  No upper extremity edema or shoulder restriction appreciated bilaterally  Lymphadenopathy:     She has no cervical adenopathy  She has no axillary adenopathy  Right: No supraclavicular adenopathy present  Left: No supraclavicular adenopathy present  Neurological: She is alert and oriented to person, place, and time  Nursing note and vitals reviewed  RESULTS    Imaging Studies  Mammo Needle Localization Left (all Inc)    Result Date: 2/28/2020  Narrative: MAMMOGRAPHY NEEDLE LOCALIZATION Indication: Left breast carcinoma  Localize stereotactic clip preoperatively Comparison: Prior imaging studies of the left breast Procedure: Informed consent was obtained prior to the procedure, discussing possible complications such as bleeding, infection, or allergic reaction   After verifying patient and side of interest and initialing side of concern (time out procedure), and utilizing digital mammographic guidance and sterile technique, a needle localization procedure of the stereotactic clip in the upper outer quadrant was performed from a lateral approach  The localizing  wire was positioned adjacent to the targeted stereotactic clip  A radiopaque skin marker was placed at the wire entry site  The patient tolerated the procedure well, leaving the department in satisfactory condition, with  guidance images available on PACS  Impression: Impression: Successful needle localization procedure of the targeted stereotactic clip  Workstation performed: [id number]     Nm Sentinal Node Inj    Result Date: 2/28/2020  Narrative: SENTINEL NODE LYMPHOSCINTIGRAPHY INDICATION:  T44 289: Malignant neoplasm of upper-outer quadrant of left female breast Z17 0: Estrogen receptor positive status (ER+)  , localize sentinel node  FINDINGS: 0 45 mCi Tc-99m sulfur colloid (0 6 cc volume) was administered intradermally into the left breast by Dr Butch Leiva in the OR  No imaging was performed  Impression: Injection of  0 45 mCi Tc-99m sulfur colloid into the left breast in the OR  Workstation performed: [id number]     Mammo Breast Specimen Left (no Charge)    Result Date: 2/28/2020  Narrative: MAMMOGRAPHY BREAST SPECIMEN INDICATION: Evaluation of left breast surgical specimen TECHNIQUE: 2 digital images submitted; images marked with respect to specimen margins  FINDINGS: The specimen includes the localizing wire  The stereotactic clip  in question is included in the specimen  Impression: The breast specimen includes the localizing wire and targeted stereotactic clip   Workstation performed: [id number]         Pathology:Collected:  12/23/2019 09:31 Status:  Edited Result - FINAL   Visible to patient:  Yes (Jacobohart) Dx:  Abnormal mammogram; Abnormal ultrasou       Component    Case Report   Surgical Pathology Report                         Case: [id number]                                    Authorizing Provider:  Jamison Liao MD   Collected:           12/23/2019 0931               Ordering Location:     [facility] Received:            12/23/2019 [address]                                                              Pathologist:           Linda Nj MD                                                                Specimen:    Breast, Left, US Left Brst  10cmfn, 5 passes, 12g marquee                           Addendum 2      Test Description                        Result               Prognostic Interpretation     Estrogen Receptor/ER                100%                 Positive   Primary Antibody: SP1  Internal control: Positive                           Staining Intensity: Strong   External control: Positive             Karan Score*: 8     Progesterone Receptor/PgR       90%                   Positive                Primary Antibody:1E2  Internal control: Positive              Staining Intensity: Strong  External control: Positive             Karan Score*:  8        HER2 by IHC                               1+                      Negative   Primary Antibody:4B5  HER2 by FISH                            Not Indicated     A positive control for each antibody has been reviewed and accepted  Fixative: Formalin:   Duration of Fixation (hours): 21 30 hours -   Meets specified requirements of latest ASCO/CAP guidelines  Cold Ischemia Time (minutes): Not stated  Sample Adequacy: Adequate     These immunohistochemical tests were performed at [facility] in [place], [place] and interpreted by Dr Skylar Lee   An electronic copy of this report will be kept on file in the Medical Records Department at [facility]      * The Karan score is a semi quantitative system that takes into consideration the proportion of positive cells (scored on a scale of 0-5) and staining intensity (scored on a scale of (0-3)  The proportion and intensity are summed to produce total scores of 0 or 2 through 8  A score of 0-2 is regarded as negative while 3-8 as positive      Addendum electronically signed by Salú Chin MD on 1/6/2020 at 1421   Addendum   ER, AK, Her2 on block A3 was cancelled due to insufficient tumor in block  ER, AK, Her2 will be re-attempted on block A2  Addendum electronically signed by Saúl Chin MD on 1/2/2020 at 5906   Final Diagnosis   A  Left breast, 2:00 position, 10 cm from nipple (core needle biopsy):  - Invasive mammary carcinoma of no special type (ductal, not otherwise specified)  - Anette grade: Score 3 (of 9) grade 1        * Tubule formation: Score 1 (of 3)        * Nuclear pleomorphism: Score 1 (of 3)        * Mitoses: Score 1 (of 3)     - Invasive carcinoma involves two (2) of five (5) cores [6mm greatest dimension]  - 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  - Focal atypical ductal hyperplasia (ADH) present    - Microcalcifications: Present  - Best representative tumor block: A2     -- Sufficient tumor present for        Agendia Mammaprint/Blueprint (1 cm2 of invasive tumor in aggregate): No         MI Profile/Foundation One (at least 5 x 5 mm of tumor): No      Comment:   - CK AE1/3 highlights ductal epithelium    - SMHC, p63, calponin, p40 show absent myoepithelial cells in area of invasive carcinoma    - ER, AK, Her2 have been ordered and results will be indicated in an addendum    - Nurse navigator TK Robles was notified via phone of the diagnosis on 12/26/19 at 9:12 am       Interpretation performed at [facility], [address] [place] [id number]      Electronically signed by Saúl Chin MD on 12/26/2019 at [address]:  2/28/2020 11:44 Status:  Final result   Visible to patient:  Yes (MyChart) Dx: Malignant neoplasm of upper-outer rosemarie       Component    Case Report   Surgical Pathology Report Case: [id number]                                    Authorizing Provider:  Elena Sultana MD              Collected:           02/28/2020 1144               Ordering Location:     [facility]        Received:            02/28/2020 [address]                                                      Pathologist:           Mike Rhodes MD                                                        Specimens:   A) - Breast, Left, Final lateral margin red                                                         B) - Breast, Left, Left breast lumpectomy marked per protocol                                       C) - Lymph Node, Lynn, Lynn lymph node left axilla                                           D) - Breast, Left, medial margin yellow                                                             E) - Breast, Left, superior margin orange                                                           F) - Breast, Left, inferior margin blue                                                             G) - Breast, Left, anterior margin green                                                             H) - Breast, Left, posterior margin black                                                 Final Diagnosis   A  Breast, left final lateral margin, excision:  -  Benign breast parenchyma and associated skin, negative for atypia or malignancy  -  Specimen entirely submitted for evaluation of margin status  B   Breast, left, lumpectomy:  -  Invasive mammary carcinoma of no special type (ductal, not otherwise specified) (see synoptic report)  -  Ductal carcinoma in situ, low grade, cribriform type (see synoptic report)  -  Background biopsy site and fibrocystic changes present  C   Lymph node, left axillary sentinel, excision:  -  One benign lymph node, negative for metastatic carcinoma       D   Breast, left final medial margin, excision:  -  Benign breast parenchyma, negative for atypia or malignancy  -  Specimen entirely submitted for evaluation of margin status  E   Breast, left final superior margin, excision:  -  Benign breast parenchyma with fibrocystic change, negative for atypia or malignancy  -  Specimen entirely submitted for evaluation of margin status  F   Breast, left final inferior margin, excision:  -  Benign breast parenchyma, negative for atypia or malignancy  -  Specimen entirely submitted for evaluation of margin status  G   Breast, left final anterior margin, excision:  -  Benign breast parenchyma, negative for atypia or malignancy  -  Specimen entirely submitted for evaluation of margin status  H   Breast, left final posterior margin, excision:  -  Benign breast parenchyma, negative for atypia or malignancy  -  Specimen entirely submitted for evaluation of margin status  Electronically signed by Prosper Judd MD on 3/5/2020 at 1022   Note    1   - Repeat HER2 testing (2013 ASCO/CAP Recommendations):  Not Indicated      - Best representative tumor block:  B3       -- Sufficient tumor present for          Agendia Mammaprint/Blueprint (1 cm2 of invasive tumor in aggregate):  yes          MI Profile/Foundation One (at least 5 x 5 mm of tumor):  yes     2  Pathologic Stage Classification (pTNM, AJCC 8th Edition):       8th ed, AJCC Anatomic Stage:  at least Stage IA - pT1c, pN0(sn), cM0, G1     3   8th ed  AJCC Pathologic Prognostic Stage (use AJCC update):  IA      Additional Information    All reported additional testing was performed with appropriately reactive controls  These tests were developed and their performance characteristics determined by [facility] or appropriate performing facility, though some tests may be performed on tissues which have not been validated for performance characteristics (such as staining performed on alcohol exposed cell blocks and decalcified tissues)    Results should be interpreted with caution and in the context of the patients clinical condition  These tests may not be cleared or approved by the [place]  Food and Drug Administration, though the FDA has determined that such clearance or approval is not necessary  These tests are used for clinical purposes and they should not be regarded as investigational or for research  This laboratory has been approved by [place] 88, designated as a high-complexity laboratory and is qualified to perform these tests       Interpretation performed at [facility] CARCINOMA OF THE BREAST: Resection   Breast Invasive - All Specimens   8th Edition - Protocol posted: 2/27/2019   SPECIMEN   Procedure  Excision (less than total mastectomy)    Specimen Laterality  Left    TUMOR   Tumor Site  Upper outer quadrant    Clock Position of Tumor Site  2 o'clock    Histologic Type  Invasive carcinoma of no special type (invasive ductal carcinoma, not otherwise specified)    Glandular (Acinar) / Tubular Differentiation  Score 1    Nuclear Pleomorphism  Score 1    Mitotic Rate  Score 1    Overall Grade  Grade 1 (scores of 3, 4 or 5)    Tumor Size  Greatest dimension of largest invasive focus (Millimeters): 15 mm   Tumor Focality  Single focus of invasive carcinoma    Ductal Carcinoma In Situ (DCIS)  Present      Negative for extensive intraductal component (EIC)    Size (Extent) of DCIS     Number of Blocks with DCIS  3    Number of Blocks Examined  39    Architectural Patterns  Cribriform    Nuclear Grade  Grade I (low)    Necrosis  Not identified    Lobular Carcinoma In Situ (LCIS)  No LCIS in specimen    Tumor Extent     Lymphovascular Invasion  Not identified    Dermal Lymphovascular Invasion  Not identified    Microcalcifications  Present in non-neoplastic tissue    Treatment Effect  No known presurgical therapy    MARGINS   Invasive Carcinoma Margins  Uninvolved by invasive carcinoma    Distance from Closest Margin (Millimeters)  Greater than: 15 mm   Closest Margin  Superior    DCIS Margins  Uninvolved by DCIS    Distance from Closest Margin (Millimeters)  Greater than: 15 mm   Closest Margin  Superior    LYMPH NODES   Regional Lymph Nodes  Uninvolved by tumor cells    Number of Lymph Nodes Examined  1    Number of [place]ake Nodes Examined  1    PATHOLOGIC STAGE CLASSIFICATION (pTNM, AJCC 8th Edition)   Primary Tumor (pT)  pT1c    Regional Lymph Nodes (pN)     Modifier  (sn): Only sentinel node(s) evaluated  Category (pN)  pN0    ADDITIONAL FINDINGS   Additional Pathologic Findings  Background fibrocystic changes including sclerosing adenosis, usual ductal hyperplasia, apocrine metaplasia, fibroadenomatoid change, and microcysts; prior biopsy site changes present    SPECIAL STUDIES   Breast Biomarker Testing Performed on Previous Biopsy     Estrogen Receptor (ER)  Positive (percentage): 100 %   Breast Biomarker Testing Performed on Previous Biopsy     Progesterone Receptor (PgR)  Positive (percentage): 90 %   Breast Biomarker Testing Performed on Previous Biopsy     HER2 (by immunohistochemistry)  Negative (Score 1+)    Testing Performed on Case Number  [id number]      ASSESSMENT  1   Malignant neoplasm of upper-outer quadrant of left breast in female, estrogen receptor positive ([facility] [address] )  Ambulatory referral to Radiation Oncology    Radiation Simulation Treatment     Cancer Staging  Stage IA      PLAN/DISCUSSION  Orders Placed This Encounter   Procedures    Radiation Simulation Treatment        Zoya Sanchez is a 77y o  year old female with past medical history significant for ruptured pancreatic cyst requiring diversion and 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 status post surgical repair and second fistula in the left abdomen who was recently diagnosed with stage IA ([id number]) sees grade 1 invasive ductal carcinoma of the left breast status post lumpectomy and sentinel lymph node achieving negative margins  Tumor cells are ER/OR positive and HER2 Vamsi negative  She has healed well from surgery and is planned for adjuvant hormone therapy  I recommended that the patient undergo adjuvant radiation to the left breast using hypofractionated regimen to a total dose of 40Gy with boost to the lumpectomy cavity to a total dose of 50Gy  I feel that this would offer the patient significant benefit in terms of local control the potential disease-free survival   She will also be evaluated for deep inspiration breath hold technique  The rationale and potential benefits, as well as the risks and acute and late side effects and potential toxicities of radiation were discussed with the patient and her  at length  Side effects discussed included, but were not limited to: Fatigue, skin erythema, hyperpigmentation, desquamation, fibrosis, shrinkage of the breast resulting asymmetry, rib weakening, pulmonary fibrosis, lymphedema, increased risk for cardiovascular disease  Her fistula will be accounted for and need to be kept out of the treatment fields to prevent complications  She was given the opportunity to ask questions and all questions were answered to her satisfaction  I did explain to the patient that overall her prognosis is favorable and that her baseline risk for recurrence of any kind with adjuvant hormone therapy will be low  Local control benefit is still seen with adjuvant radiation, but hormone therapy alone would also be an option  She wished to proceed with the recommended radiation treatment plan  Due to Covid-19 pandemic, we will plan to schedule the patient to return for CT simulation at post-op week 8  Depending on status at of pandemic at that time she may be pushed up to 12 weeks post surgery  We will re-evaluate at that time  We are available for any questions or concerns that may arise        Shaheen Bennett MD  3/25/2020,2:25 PM      Portions of the record may have been created with voice recognition software   Occasional wrong word or "sound a like" substitutions may have occurred due to the inherent limitations of voice recognition software   Read the chart carefully and recognize, using context, where substitutions have occurred

## 2020-04-22 ENCOUNTER — TELEMEDICINE (OUTPATIENT)
Dept: HEMATOLOGY ONCOLOGY | Facility: CLINIC | Age: 65
End: 2020-04-22
Payer: MEDICARE

## 2020-04-22 DIAGNOSIS — Z17.0 MALIGNANT NEOPLASM OF UPPER-OUTER QUADRANT OF LEFT BREAST IN FEMALE, ESTROGEN RECEPTOR POSITIVE (HCC): ICD-10-CM

## 2020-04-22 DIAGNOSIS — C50.412 MALIGNANT NEOPLASM OF UPPER-OUTER QUADRANT OF LEFT BREAST IN FEMALE, ESTROGEN RECEPTOR POSITIVE (HCC): ICD-10-CM

## 2020-04-22 PROCEDURE — G2012 BRIEF CHECK IN BY MD/QHP: HCPCS | Performed by: INTERNAL MEDICINE

## 2020-04-22 RX ORDER — ANASTROZOLE 1 MG/1
1 TABLET ORAL DAILY
Qty: 90 TABLET | Refills: 1 | Status: SHIPPED | OUTPATIENT
Start: 2020-04-22 | End: 2020-10-15 | Stop reason: SDUPTHER

## 2020-05-01 ENCOUNTER — APPOINTMENT (OUTPATIENT)
Dept: RADIATION ONCOLOGY | Facility: CLINIC | Age: 65
End: 2020-05-01
Attending: RADIOLOGY
Payer: MEDICARE

## 2020-05-01 ENCOUNTER — DOCUMENTATION (OUTPATIENT)
Dept: INFUSION CENTER | Facility: CLINIC | Age: 65
End: 2020-05-01

## 2020-05-01 PROCEDURE — 77290 THER RAD SIMULAJ FIELD CPLX: CPT | Performed by: RADIOLOGY

## 2020-05-01 PROCEDURE — 77333 RADIATION TREATMENT AID(S): CPT | Performed by: RADIOLOGY

## 2020-05-07 PROCEDURE — 77300 RADIATION THERAPY DOSE PLAN: CPT | Performed by: RADIOLOGY

## 2020-05-07 PROCEDURE — 77295 3-D RADIOTHERAPY PLAN: CPT | Performed by: RADIOLOGY

## 2020-05-07 PROCEDURE — 77334 RADIATION TREATMENT AID(S): CPT | Performed by: RADIOLOGY

## 2020-05-08 ENCOUNTER — APPOINTMENT (OUTPATIENT)
Dept: RADIATION ONCOLOGY | Facility: CLINIC | Age: 65
End: 2020-05-08
Attending: RADIOLOGY
Payer: MEDICARE

## 2020-05-08 PROCEDURE — 77280 THER RAD SIMULAJ FIELD SMPL: CPT | Performed by: RADIOLOGY

## 2020-05-11 ENCOUNTER — APPOINTMENT (OUTPATIENT)
Dept: RADIATION ONCOLOGY | Facility: CLINIC | Age: 65
End: 2020-05-11
Attending: RADIOLOGY
Payer: MEDICARE

## 2020-05-11 PROCEDURE — 77387 GUIDANCE FOR RADJ TX DLVR: CPT | Performed by: RADIOLOGY

## 2020-05-11 PROCEDURE — 77412 RADIATION TX DELIVERY LVL 3: CPT | Performed by: RADIOLOGY

## 2020-05-11 PROCEDURE — 77331 SPECIAL RADIATION DOSIMETRY: CPT | Performed by: RADIOLOGY

## 2020-05-12 ENCOUNTER — APPOINTMENT (OUTPATIENT)
Dept: RADIATION ONCOLOGY | Facility: CLINIC | Age: 65
End: 2020-05-12
Attending: RADIOLOGY
Payer: MEDICARE

## 2020-05-12 PROCEDURE — 77412 RADIATION TX DELIVERY LVL 3: CPT | Performed by: RADIOLOGY

## 2020-05-12 PROCEDURE — 77387 GUIDANCE FOR RADJ TX DLVR: CPT | Performed by: RADIOLOGY

## 2020-05-13 ENCOUNTER — APPOINTMENT (OUTPATIENT)
Dept: RADIATION ONCOLOGY | Facility: CLINIC | Age: 65
End: 2020-05-13
Attending: RADIOLOGY
Payer: MEDICARE

## 2020-05-13 PROCEDURE — 77412 RADIATION TX DELIVERY LVL 3: CPT | Performed by: RADIOLOGY

## 2020-05-13 PROCEDURE — 77387 GUIDANCE FOR RADJ TX DLVR: CPT | Performed by: RADIOLOGY

## 2020-05-14 ENCOUNTER — APPOINTMENT (OUTPATIENT)
Dept: RADIATION ONCOLOGY | Facility: CLINIC | Age: 65
End: 2020-05-14
Attending: RADIOLOGY
Payer: MEDICARE

## 2020-05-14 PROCEDURE — 77412 RADIATION TX DELIVERY LVL 3: CPT | Performed by: RADIOLOGY

## 2020-05-14 PROCEDURE — 77387 GUIDANCE FOR RADJ TX DLVR: CPT | Performed by: RADIOLOGY

## 2020-05-15 ENCOUNTER — APPOINTMENT (OUTPATIENT)
Dept: RADIATION ONCOLOGY | Facility: CLINIC | Age: 65
End: 2020-05-15
Attending: RADIOLOGY
Payer: MEDICARE

## 2020-05-15 PROCEDURE — 77412 RADIATION TX DELIVERY LVL 3: CPT | Performed by: RADIOLOGY

## 2020-05-15 PROCEDURE — 77387 GUIDANCE FOR RADJ TX DLVR: CPT | Performed by: RADIOLOGY

## 2020-05-15 PROCEDURE — 77417 THER RADIOLOGY PORT IMAGE(S): CPT | Performed by: RADIOLOGY

## 2020-05-15 PROCEDURE — 77336 RADIATION PHYSICS CONSULT: CPT | Performed by: RADIOLOGY

## 2020-05-18 ENCOUNTER — APPOINTMENT (OUTPATIENT)
Dept: RADIATION ONCOLOGY | Facility: CLINIC | Age: 65
End: 2020-05-18
Attending: RADIOLOGY
Payer: MEDICARE

## 2020-05-18 PROCEDURE — 77387 GUIDANCE FOR RADJ TX DLVR: CPT | Performed by: RADIOLOGY

## 2020-05-18 PROCEDURE — 77412 RADIATION TX DELIVERY LVL 3: CPT | Performed by: RADIOLOGY

## 2020-05-19 ENCOUNTER — APPOINTMENT (OUTPATIENT)
Dept: RADIATION ONCOLOGY | Facility: CLINIC | Age: 65
End: 2020-05-19
Attending: RADIOLOGY
Payer: MEDICARE

## 2020-05-19 PROCEDURE — 77387 GUIDANCE FOR RADJ TX DLVR: CPT | Performed by: RADIOLOGY

## 2020-05-19 PROCEDURE — 77412 RADIATION TX DELIVERY LVL 3: CPT | Performed by: RADIOLOGY

## 2020-05-20 ENCOUNTER — APPOINTMENT (OUTPATIENT)
Dept: RADIATION ONCOLOGY | Facility: CLINIC | Age: 65
End: 2020-05-20
Attending: RADIOLOGY
Payer: MEDICARE

## 2020-05-20 PROCEDURE — 77412 RADIATION TX DELIVERY LVL 3: CPT | Performed by: RADIOLOGY

## 2020-05-20 PROCEDURE — 77387 GUIDANCE FOR RADJ TX DLVR: CPT | Performed by: RADIOLOGY

## 2020-05-21 ENCOUNTER — APPOINTMENT (OUTPATIENT)
Dept: RADIATION ONCOLOGY | Facility: CLINIC | Age: 65
End: 2020-05-21
Attending: RADIOLOGY
Payer: MEDICARE

## 2020-05-21 PROCEDURE — 77412 RADIATION TX DELIVERY LVL 3: CPT | Performed by: RADIOLOGY

## 2020-05-21 PROCEDURE — 77387 GUIDANCE FOR RADJ TX DLVR: CPT | Performed by: RADIOLOGY

## 2020-05-22 ENCOUNTER — APPOINTMENT (OUTPATIENT)
Dept: RADIATION ONCOLOGY | Facility: CLINIC | Age: 65
End: 2020-05-22
Attending: RADIOLOGY
Payer: MEDICARE

## 2020-05-22 PROCEDURE — 77417 THER RADIOLOGY PORT IMAGE(S): CPT | Performed by: RADIOLOGY

## 2020-05-22 PROCEDURE — 77387 GUIDANCE FOR RADJ TX DLVR: CPT | Performed by: RADIOLOGY

## 2020-05-22 PROCEDURE — 77336 RADIATION PHYSICS CONSULT: CPT | Performed by: RADIOLOGY

## 2020-05-22 PROCEDURE — 77412 RADIATION TX DELIVERY LVL 3: CPT | Performed by: RADIOLOGY

## 2020-05-26 ENCOUNTER — APPOINTMENT (OUTPATIENT)
Dept: RADIATION ONCOLOGY | Facility: CLINIC | Age: 65
End: 2020-05-26
Attending: RADIOLOGY
Payer: MEDICARE

## 2020-05-26 PROCEDURE — 77387 GUIDANCE FOR RADJ TX DLVR: CPT | Performed by: RADIOLOGY

## 2020-05-26 PROCEDURE — 77412 RADIATION TX DELIVERY LVL 3: CPT | Performed by: RADIOLOGY

## 2020-05-27 ENCOUNTER — APPOINTMENT (OUTPATIENT)
Dept: RADIATION ONCOLOGY | Facility: CLINIC | Age: 65
End: 2020-05-27
Attending: RADIOLOGY
Payer: MEDICARE

## 2020-05-27 PROCEDURE — 77412 RADIATION TX DELIVERY LVL 3: CPT | Performed by: RADIOLOGY

## 2020-05-27 PROCEDURE — 77387 GUIDANCE FOR RADJ TX DLVR: CPT | Performed by: RADIOLOGY

## 2020-05-28 ENCOUNTER — APPOINTMENT (OUTPATIENT)
Dept: RADIATION ONCOLOGY | Facility: CLINIC | Age: 65
End: 2020-05-28
Attending: RADIOLOGY
Payer: MEDICARE

## 2020-05-28 PROCEDURE — 77307 TELETHX ISODOSE PLAN CPLX: CPT | Performed by: RADIOLOGY

## 2020-05-28 PROCEDURE — 77387 GUIDANCE FOR RADJ TX DLVR: CPT | Performed by: RADIOLOGY

## 2020-05-28 PROCEDURE — 77334 RADIATION TREATMENT AID(S): CPT | Performed by: RADIOLOGY

## 2020-05-28 PROCEDURE — 77412 RADIATION TX DELIVERY LVL 3: CPT | Performed by: RADIOLOGY

## 2020-05-29 ENCOUNTER — APPOINTMENT (OUTPATIENT)
Dept: RADIATION ONCOLOGY | Facility: CLINIC | Age: 65
End: 2020-05-29
Attending: RADIOLOGY
Payer: MEDICARE

## 2020-05-29 PROCEDURE — 77412 RADIATION TX DELIVERY LVL 3: CPT | Performed by: RADIOLOGY

## 2020-05-29 PROCEDURE — 77387 GUIDANCE FOR RADJ TX DLVR: CPT | Performed by: RADIOLOGY

## 2020-06-01 ENCOUNTER — APPOINTMENT (OUTPATIENT)
Dept: RADIATION ONCOLOGY | Facility: CLINIC | Age: 65
End: 2020-06-01
Attending: RADIOLOGY
Payer: MEDICARE

## 2020-06-01 PROCEDURE — 77412 RADIATION TX DELIVERY LVL 3: CPT | Performed by: RADIOLOGY

## 2020-06-01 PROCEDURE — 77336 RADIATION PHYSICS CONSULT: CPT | Performed by: RADIOLOGY

## 2020-06-01 PROCEDURE — 77387 GUIDANCE FOR RADJ TX DLVR: CPT | Performed by: RADIOLOGY

## 2020-06-02 ENCOUNTER — APPOINTMENT (OUTPATIENT)
Dept: RADIATION ONCOLOGY | Facility: CLINIC | Age: 65
End: 2020-06-02
Attending: RADIOLOGY
Payer: MEDICARE

## 2020-06-02 PROCEDURE — 77280 THER RAD SIMULAJ FIELD SMPL: CPT | Performed by: RADIOLOGY

## 2020-06-02 PROCEDURE — 77387 GUIDANCE FOR RADJ TX DLVR: CPT | Performed by: RADIOLOGY

## 2020-06-02 PROCEDURE — 77412 RADIATION TX DELIVERY LVL 3: CPT | Performed by: RADIOLOGY

## 2020-06-02 PROCEDURE — 77331 SPECIAL RADIATION DOSIMETRY: CPT | Performed by: RADIOLOGY

## 2020-06-03 ENCOUNTER — APPOINTMENT (OUTPATIENT)
Dept: RADIATION ONCOLOGY | Facility: CLINIC | Age: 65
End: 2020-06-03
Attending: RADIOLOGY
Payer: MEDICARE

## 2020-06-03 PROCEDURE — 77412 RADIATION TX DELIVERY LVL 3: CPT | Performed by: RADIOLOGY

## 2020-06-03 PROCEDURE — 77387 GUIDANCE FOR RADJ TX DLVR: CPT | Performed by: RADIOLOGY

## 2020-06-04 ENCOUNTER — APPOINTMENT (OUTPATIENT)
Dept: RADIATION ONCOLOGY | Facility: CLINIC | Age: 65
End: 2020-06-04
Attending: RADIOLOGY
Payer: MEDICARE

## 2020-06-04 PROCEDURE — 77412 RADIATION TX DELIVERY LVL 3: CPT | Performed by: RADIOLOGY

## 2020-06-04 PROCEDURE — 77387 GUIDANCE FOR RADJ TX DLVR: CPT | Performed by: RADIOLOGY

## 2020-06-05 ENCOUNTER — APPOINTMENT (OUTPATIENT)
Dept: RADIATION ONCOLOGY | Facility: CLINIC | Age: 65
End: 2020-06-05
Attending: RADIOLOGY
Payer: MEDICARE

## 2020-06-05 PROCEDURE — 77387 GUIDANCE FOR RADJ TX DLVR: CPT | Performed by: RADIOLOGY

## 2020-06-05 PROCEDURE — 77412 RADIATION TX DELIVERY LVL 3: CPT | Performed by: RADIOLOGY

## 2020-06-08 ENCOUNTER — APPOINTMENT (OUTPATIENT)
Dept: RADIATION ONCOLOGY | Facility: CLINIC | Age: 65
End: 2020-06-08
Attending: RADIOLOGY
Payer: MEDICARE

## 2020-06-08 PROCEDURE — 77336 RADIATION PHYSICS CONSULT: CPT | Performed by: RADIOLOGY

## 2020-06-08 PROCEDURE — 77387 GUIDANCE FOR RADJ TX DLVR: CPT | Performed by: RADIOLOGY

## 2020-06-08 PROCEDURE — 77412 RADIATION TX DELIVERY LVL 3: CPT | Performed by: RADIOLOGY

## 2020-06-17 ENCOUNTER — OFFICE VISIT (OUTPATIENT)
Dept: SURGICAL ONCOLOGY | Facility: CLINIC | Age: 65
End: 2020-06-17
Payer: MEDICARE

## 2020-06-17 VITALS
HEART RATE: 90 BPM | HEIGHT: 65 IN | TEMPERATURE: 96.9 F | DIASTOLIC BLOOD PRESSURE: 90 MMHG | BODY MASS INDEX: 28.99 KG/M2 | RESPIRATION RATE: 16 BRPM | SYSTOLIC BLOOD PRESSURE: 144 MMHG | WEIGHT: 174 LBS

## 2020-06-17 DIAGNOSIS — Z17.0 MALIGNANT NEOPLASM OF UPPER-OUTER QUADRANT OF LEFT BREAST IN FEMALE, ESTROGEN RECEPTOR POSITIVE (HCC): Primary | ICD-10-CM

## 2020-06-17 DIAGNOSIS — Z79.811 USE OF ANASTROZOLE (ARIMIDEX): ICD-10-CM

## 2020-06-17 DIAGNOSIS — C50.412 MALIGNANT NEOPLASM OF UPPER-OUTER QUADRANT OF LEFT BREAST IN FEMALE, ESTROGEN RECEPTOR POSITIVE (HCC): Primary | ICD-10-CM

## 2020-06-17 PROCEDURE — 99214 OFFICE O/P EST MOD 30 MIN: CPT | Performed by: NURSE PRACTITIONER

## 2020-07-10 ENCOUNTER — TELEMEDICINE (OUTPATIENT)
Dept: RADIATION ONCOLOGY | Facility: CLINIC | Age: 65
End: 2020-07-10
Attending: RADIOLOGY

## 2020-07-10 DIAGNOSIS — Z17.0 MALIGNANT NEOPLASM OF UPPER-OUTER QUADRANT OF LEFT BREAST IN FEMALE, ESTROGEN RECEPTOR POSITIVE (HCC): Primary | ICD-10-CM

## 2020-07-10 DIAGNOSIS — C50.412 MALIGNANT NEOPLASM OF UPPER-OUTER QUADRANT OF LEFT BREAST IN FEMALE, ESTROGEN RECEPTOR POSITIVE (HCC): Primary | ICD-10-CM

## 2020-07-14 NOTE — PROGRESS NOTES
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 diagnosed with stage IA, grade 1 invasive ductal carcinoma of the left breast status post lumpectomy and sentinel lymph node biopsy achieving negative margins on 2/28/20  Tumor cells were ER/KS positive and [id number] negative  She  completed a course of adjuvant radiation on 6/8/20  Today, we called the patient for follow-up evaluation in effort to reduce risk of COVID-19 exposure  The patient states that her skin has healed well per patient  She notes continued hypoerpigmentation, but she denies erythema, tenderness, or pain of the breasts bilaterally  She denies new palpable nodules, suspicious skin changes, or nipple discharge of the breasts bilaterally  She denies vaginal discharge of bleeding  She is maintained on anastrazole, which she is tolerating well  Assessment/Plan:  Antoni Villalta is a 24-year-old with past medical history significant for ruptured pancreatic cyst requiring diversion a 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 diagnosed with stage IA, grade 1 invasive ductal carcinoma of the left breast status post resection followed by adjuvant radiation completed in June 2020  Tumor cells were ER/KS positive and [id number] negative and is maintained on anastrazole  She is recovering well from radiation  I instructed her to practice sun protection to the irradiated skin  I recommended continued daily emollient use and initiate light breast massage to promote continued tissue healing  She will continue follow-up with Dr Sheng Gage in October 2019  She is scheduled for surveillance mammogram in December 2020 with follow-up with Surgical Oncology in January 2020  She return for follow-up with us in 6 months  She knows to contact us with any questions or concerns that may arise in the interim

## 2020-10-15 DIAGNOSIS — Z17.0 MALIGNANT NEOPLASM OF UPPER-OUTER QUADRANT OF LEFT BREAST IN FEMALE, ESTROGEN RECEPTOR POSITIVE (HCC): ICD-10-CM

## 2020-10-15 DIAGNOSIS — C50.412 MALIGNANT NEOPLASM OF UPPER-OUTER QUADRANT OF LEFT BREAST IN FEMALE, ESTROGEN RECEPTOR POSITIVE (HCC): ICD-10-CM

## 2020-10-15 RX ORDER — ANASTROZOLE 1 MG/1
1 TABLET ORAL DAILY
Qty: 90 TABLET | Refills: 1 | Status: SHIPPED | OUTPATIENT
Start: 2020-10-15 | End: 2021-04-09

## 2020-10-15 RX ORDER — ANASTROZOLE 1 MG/1
1 TABLET ORAL DAILY
Qty: 90 TABLET | Refills: 0 | Status: CANCELLED | OUTPATIENT
Start: 2020-10-15

## 2020-10-28 ENCOUNTER — OFFICE VISIT (OUTPATIENT)
Dept: HEMATOLOGY ONCOLOGY | Facility: CLINIC | Age: 65
End: 2020-10-28
Payer: MEDICARE

## 2020-10-28 VITALS
BODY MASS INDEX: 28.82 KG/M2 | OXYGEN SATURATION: 98 % | HEART RATE: 90 BPM | RESPIRATION RATE: 18 BRPM | DIASTOLIC BLOOD PRESSURE: 82 MMHG | HEIGHT: 65 IN | SYSTOLIC BLOOD PRESSURE: 138 MMHG | WEIGHT: 173 LBS | TEMPERATURE: 97.7 F

## 2020-10-28 DIAGNOSIS — C50.412 MALIGNANT NEOPLASM OF UPPER-OUTER QUADRANT OF LEFT BREAST IN FEMALE, ESTROGEN RECEPTOR POSITIVE (HCC): Primary | ICD-10-CM

## 2020-10-28 DIAGNOSIS — Z17.0 MALIGNANT NEOPLASM OF UPPER-OUTER QUADRANT OF LEFT BREAST IN FEMALE, ESTROGEN RECEPTOR POSITIVE (HCC): Primary | ICD-10-CM

## 2020-10-28 PROCEDURE — 99214 OFFICE O/P EST MOD 30 MIN: CPT | Performed by: INTERNAL MEDICINE

## 2020-10-28 RX ORDER — BLOOD-GLUCOSE METER
KIT MISCELLANEOUS
COMMUNITY
Start: 2020-10-15

## 2020-12-14 ENCOUNTER — HOSPITAL ENCOUNTER (OUTPATIENT)
Dept: MAMMOGRAPHY | Facility: CLINIC | Age: 65
Discharge: HOME | End: 2020-12-14
Payer: MEDICARE

## 2020-12-14 VITALS — HEIGHT: 65 IN | BODY MASS INDEX: 28.32 KG/M2 | WEIGHT: 170 LBS

## 2020-12-14 DIAGNOSIS — C50.412 MALIGNANT NEOPLASM OF UPPER-OUTER QUADRANT OF LEFT BREAST IN FEMALE, ESTROGEN RECEPTOR POSITIVE (HCC): ICD-10-CM

## 2020-12-14 DIAGNOSIS — Z17.0 MALIGNANT NEOPLASM OF UPPER-OUTER QUADRANT OF LEFT BREAST IN FEMALE, ESTROGEN RECEPTOR POSITIVE (HCC): ICD-10-CM

## 2020-12-14 PROCEDURE — 77066 DX MAMMO INCL CAD BI: CPT

## 2020-12-14 PROCEDURE — G0279 TOMOSYNTHESIS, MAMMO: HCPCS

## 2021-01-07 ENCOUNTER — OFFICE VISIT (OUTPATIENT)
Dept: SURGICAL ONCOLOGY | Facility: CLINIC | Age: 66
End: 2021-01-07
Payer: MEDICARE

## 2021-01-07 VITALS
HEART RATE: 76 BPM | BODY MASS INDEX: 28.82 KG/M2 | WEIGHT: 173 LBS | SYSTOLIC BLOOD PRESSURE: 166 MMHG | HEIGHT: 65 IN | TEMPERATURE: 97.6 F | DIASTOLIC BLOOD PRESSURE: 80 MMHG

## 2021-01-07 DIAGNOSIS — Z79.811 USE OF ANASTROZOLE (ARIMIDEX): ICD-10-CM

## 2021-01-07 DIAGNOSIS — Z17.0 MALIGNANT NEOPLASM OF UPPER-OUTER QUADRANT OF LEFT BREAST IN FEMALE, ESTROGEN RECEPTOR POSITIVE (HCC): Primary | ICD-10-CM

## 2021-01-07 DIAGNOSIS — C50.412 MALIGNANT NEOPLASM OF UPPER-OUTER QUADRANT OF LEFT BREAST IN FEMALE, ESTROGEN RECEPTOR POSITIVE (HCC): Primary | ICD-10-CM

## 2021-01-07 DIAGNOSIS — Z78.9 NEED FOR FOLLOW-UP BY SOCIAL WORKER: ICD-10-CM

## 2021-01-07 PROCEDURE — 99214 OFFICE O/P EST MOD 30 MIN: CPT | Performed by: NURSE PRACTITIONER

## 2021-01-07 NOTE — PROGRESS NOTES
Surgical Oncology Follow Up       [address]  CANCER [facility] SURGICAL ONCOLOGY [place]  [address]  1955  [id number]  [address] Steele  CANCER [facility] SURGICAL ONCOL[facility] [place]  [address] [facility] [id number]    Chief Complaint   Patient presents with    Breast Cancer     Pt is here for 6 month f/u       Assessment/Plan:  1  Need for follow-up by   - Ambulatory referral to social work care management program; Future    2  Malignant neoplasm of upper-outer quadrant of left breast in female, estrogen receptor positive ([facility] [address] )  - 6 mo f/u visit    3  Use of anastrozole (Arimidex)  - Continue use per medical oncology    Discussion/Summary: Patient is a 26-year-old female who presents today for a six-month follow-up visit for left breast cancer diagnosed in December of 2019  Her pathology revealed invasive mammary carcinoma, %, SD 90%, her 2-  She underwent genetic testing which was negative  She underwent a left lumpectomy and sentinel node biopsy by Dr Cristobal Parmar  She completed whole breast radiation and is currently taking anastrozole  She had a bilateral mammogram on December 14, 2020 which was BI-RADS 2, category 3 density  She has no new complaints today and there are no concerns on today's exam   I will plan to see her back in 6 months or sooner should the need arise  She was instructed to call with any new concerns or symptoms prior to that time  All of her questions were answered today  History of Present Illness:     Oncology History   Malignant neoplasm of upper-outer quadrant of left breast in female, estrogen receptor positive ([facility] [address] )   12/23/2019 Biopsy    Left breast ultrasound-guided biopsy  2 o'clock, 10 cm from nipple  Invasive mammary carcinoma of no special type  Grade 1    SD 90  HER2 1+    Concordant  Appears unifocal - difficulty assessing accurate size on mammo vs US  2 1 cm vs 1 1 cm; possible concern for adjacent mass on original US not able to be visualized on biopsy  Left axilla US negative  Right breast clear  1/17/2020 Genetic Testing    The following genes were evaluated: ALEKSANDAR, BARD1, BRCA1, BRCA2, BRIP1, CDH1, CHEK2, NBN, NF1, PALB2, PTEN, RAD50, STK11, TP53  Negative result  No pathogenic sequence variants or deletions/dupllications identified  [facility]     2/28/2020 Surgery    Left breast needle localized lumpectomy with sentinel lymph node biopsy  Invasive carcinoma of no special type  Grade 1  1 5 cm  Margins negative  0/1 Lymph node  Anatomic/Prognostic Stage IA          -Interval History:  Patient presents today for follow-up visit for left breast cancer diagnosed in December of 2019  She notices no changes on her self breast exam   She continues to apply lotion and massage the breast   She had a bilateral mammogram in December which was BI-RADS 2  She denies persistent headaches, back pain or bone pain, cough or shortness of breath, abdominal pain  Review of Systems:  Review of Systems   Constitutional: Negative for activity change, appetite change, chills, fatigue, fever and unexpected weight change  Respiratory: Negative for cough and shortness of breath  Cardiovascular: Negative for chest pain  Gastrointestinal: Negative for abdominal pain, constipation, diarrhea, nausea and vomiting  Musculoskeletal: Negative for arthralgias, back pain, gait problem and myalgias  Skin: Negative for color change and rash  Neurological: Negative for dizziness and headaches  Hematological: Negative for adenopathy  Psychiatric/Behavioral: Negative for agitation and confusion  All other systems reviewed and are negative        Patient Active Problem List   Diagnosis    DVT of upper extremity (deep vein thrombosis) (HCC)    Type I diabetes mellitus, uncontrolled ([facility] [address] )    Encounter for gynecological examination without abnormal finding    Malignant neoplasm of upper-outer quadrant of left breast in female, estrogen receptor positive ([facility] [address] )    Use of anastrozole (Arimidex)     Past Medical History:   Diagnosis Date    BRCA gene mutation negative 01/2020    [facility]    Breast cancer ([facility] [address] ) 12/2019    left    Diabetes mellitus ([facility] [address] )     History of radiation therapy 01/2020    left breast ca    History of transfusion     2010    Hypertension     Pancreatitis     pancreatic cyst that burst    Pulmonary embolism on left [facility])     and right side     Past Surgical History:   Procedure Laterality Date    BREAST CYST EXCISION Left 2009    benign    BREAST LUMPECTOMY Left 2/28/2020    Procedure: BREAST NEEDLE LOCALIZED LUMPECTOMY (NEEDLE LOC AT 0900); Surgeon: Mona Liu MD;  [facility];  Service: Surgical Oncology    COLONOSCOPY      FISTULA REPAIR      small intestine    ILEOSTOMY      with reversal    LYMPH NODE BIOPSY Left 2/28/2020    Procedure: SENTINEL LYMPH NODE BIOPSY; LYMPHATIC MAPPING WITH BLUE DYE AND RADIOACTIVE DYE (INJECT AT 1100 BY DR BENOIT IN THE OR);   Surgeon: Mona Liu MD;  [facility];  Service: Surgical Oncology    MAMMO NEEDLE LOCALIZATION LEFT (ALL INC) Left 2/28/2020    TRACHEOSTOMY  2010    with repair    US GUIDED BREAST BIOPSY LEFT COMPLETE Left 12/23/2019     Family History   Problem Relation Age of Onset    Breast cancer Mother 43    Cervical cancer Mother 58    Breast cancer Sister 61    No Known Problems Maternal Grandmother     No Known Problems Paternal Grandmother     No Known Problems Maternal Aunt     Bone cancer Paternal Aunt 43    No Known Problems Paternal Aunt     Cancer Father         Bladder     Social History     Socioeconomic History    Marital status: /Civil Union     Spouse name: Not on file    Number of children: Not on file    Years of education: Not on file    Highest education level: Not on file   Occupational History    Not on file   Social Needs    Financial resource strain: Not on file    Food insecurity     Worry: Not on file     Inability: Not on file    Transportation needs     Medical: Not on file     Non-medical: Not on file   Tobacco Use    Smoking status: Never Smoker    Smokeless tobacco: Never Used   Substance and Sexual Activity    Alcohol use: No    Drug use: No    Sexual activity: Not Currently     Birth control/protection: Post-menopausal   Lifestyle    Physical activity     Days per week: Not on file     Minutes per session: Not on file    Stress: Not on file   Relationships    Social connections     Talks on phone: Not on file     Gets together: Not on file     Attends Mandaeism service: Not on file     Active member of club or organization: Not on file     Attends meetings of clubs or organizations: Not on file     Relationship status: Not on file    Intimate partner violence     Fear of current or ex partner: Not on file     Emotionally abused: Not on file     Physically abused: Not on file     Forced sexual activity: Not on file   Other Topics Concern    Not on file   Social History Narrative    Not on file       Current Outpatient Medications:     anastrozole (ARIMIDEX) 1 mg tablet, Take 1 tablet (1 mg total) by mouth daily, Disp: 90 tablet, Rfl: 1    Aspirin 81 MG EC tablet, Take by mouth, Disp: , Rfl:     Calcium-Vitamin D-Vitamin K 650-12 5-40 MG-MCG-MCG CHEW, , Disp: , Rfl:     Cyanocobalamin (VITAMIN B 12) 100 MCG LOZG, Take by mouth, Disp: , Rfl:     fexofenadine (ALLEGRA) 60 MG tablet, Take by mouth, Disp: , Rfl:     FREESTYLE LITE test strip, use 1 TEST STRIP to TEST BLOOD SUGAR four times a day, Disp: , Rfl:     hydrocortisone 0 5 % cream, Apply topically 2 (two) times a day, Disp: , Rfl:     Insulin Infusion Pump KIT, by Does not apply route , Disp: , Rfl:     LOSARTAN POTASSIUM PO, Take 50 mg by mouth, Disp: , Rfl:     NOVOLOG 100 UNIT/ML injection, , Disp: , Rfl:     Polyethylene Glycol 3350 (MIRALAX PO), , Disp: , Rfl:     Specialty Vitamins Products (MARTI-RX DIABETIC VITAMIN PO), Take by mouth daily, Disp: , Rfl:     Cholecalciferol (VITAMIN D3 PO), Take by mouth, Disp: , Rfl:     Omega-3 Fatty Acids (FISH OIL) 1,000 mg, Take 1,000 mg by mouth, Disp: , Rfl:     oxyCODONE-acetaminophen (PERCOCET) 5-325 mg per tablet, Take 1 tablet by mouth every 6 (six) hours as needed for moderate painMax Daily Amount: 4 tablets (Patient not taking: Reported on 3/19/2020), Disp: 6 tablet, Rfl: 0  Allergies   Allergen Reactions    Penicillins Hives    Ampicillin Rash    Sulfa Antibiotics Fatigue     Vitals:    01/07/21 1255   BP: 166/80   Pulse: 76   Temp: 97 6 °F (36 4 °C)       Physical Exam  Vitals signs reviewed  Constitutional:       General: She is not in acute distress  Appearance: Normal appearance  She is well-developed  She is not diaphoretic  HENT:      Head: Normocephalic and atraumatic  Neck:      Musculoskeletal: Normal range of motion  Cardiovascular:      Rate and Rhythm: Normal rate and regular rhythm  Heart sounds: Normal heart sounds  Pulmonary:      Effort: Pulmonary effort is normal       Breath sounds: Normal breath sounds  Chest:      Breasts:         Right: No swelling, bleeding, inverted nipple, mass, nipple discharge, skin change or tenderness  Left: Skin change (surgical scars) present  No swelling, bleeding, inverted nipple, mass, nipple discharge or tenderness  Abdominal:      Palpations: Abdomen is soft  There is no mass  Tenderness: There is no abdominal tenderness  Comments: fistula   Musculoskeletal: Normal range of motion  Lymphadenopathy:      Upper Body:      Right upper body: No supraclavicular or axillary adenopathy  Left upper body: No supraclavicular or axillary adenopathy  Skin:     General: Skin is warm and dry  Findings: No rash  Neurological:      Mental Status: She is alert and oriented to person, place, and time     Psychiatric: Speech: Speech normal            Results:    Imaging  Mammo Diagnostic Bilateral W 3d & Cad    Result Date: 12/14/2020  Narrative: DIAGNOSIS: Malignant neoplasm of upper-outer quadrant of left breast in female, estrogen receptor positive ([facility] [address] ) TECHNIQUE: Digital screening mammography was performed  Computer Aided Detection (CAD) analyzed all applicable images  COMPARISONS: Prior breast imaging dated: 12/19/2019, 12/12/2019, 10/11/2018, 07/07/2017, and 09/18/2015 RELEVANT HISTORY: Family Breast Cancer History: History of breast cancer in Mother, Sister  Family Medical History: Family medical history includes breast cancer in 2 relatives (mother, sister)  Personal History: Hormone history includes hormone replacement therapy  Surgical history includes lumpectomy and breast excisional biopsy  Medical history includes breast cancer  RISK ASSESSMENT: 5 Year Tyrer-Cuzick: 5 5 % 10 Year Tyrer-Cuzick: 10 26 % Lifetime Tyrer-Cuzick: 19 82 % TISSUE DENSITY: The breasts are heterogeneously dense, which may obscure small masses  INDICATION: Nicho Kaur is a 77 y o  female presenting for history of breast cancer   FINDINGS: Bilateral There are no suspicious masses, grouped microcalcifications or areas of architectural distortion  The skin and nipple areolar complex are unremarkable  Post operative/radiation changes noted in the left breast   Atherosclerotic calcifications also present  Impression:  Postoperative/radiation changes in the left breast   No evidence of malignancy  ASSESSMENT/BI-RADS CATEGORY: Left: 2 - Benign Right: 2 - Benign Overall: 2 - Benign RECOMMENDATION:      - Diagnostic mammogram in 1 year for both breasts  Workstation ID: [id number]      I reviewed the above imaging data  Advance Care Planning/Advance Directives:  Discussed disease status, cancer treatment plans and/or cancer treatment goals with the patient

## 2021-01-12 ENCOUNTER — PATIENT OUTREACH (OUTPATIENT)
Dept: CASE MANAGEMENT | Facility: HOSPITAL | Age: 66
End: 2021-01-12

## 2021-01-12 NOTE — PROGRESS NOTES
LSW received DT and problem list via referral process  Pt self scored 5/10 and noted concerns with worry and physical issues  LSW contacted pt to discuss concerns  Pt stated she is doing well with her diagnosis it is everything else that some days causes her stress  Pt stated he stress is manageable and she is not in need of assistance at this time  LSW encouraged pt to call with future needs, pt agreed

## 2021-01-15 ENCOUNTER — RADIATION ONCOLOGY FOLLOW-UP (OUTPATIENT)
Dept: RADIATION ONCOLOGY | Facility: CLINIC | Age: 66
End: 2021-01-15
Attending: RADIOLOGY
Payer: MEDICARE

## 2021-01-15 VITALS
HEART RATE: 88 BPM | HEIGHT: 65 IN | SYSTOLIC BLOOD PRESSURE: 142 MMHG | BODY MASS INDEX: 28.82 KG/M2 | WEIGHT: 173 LBS | RESPIRATION RATE: 16 BRPM | TEMPERATURE: 97.5 F | DIASTOLIC BLOOD PRESSURE: 80 MMHG

## 2021-01-15 DIAGNOSIS — Z17.0 MALIGNANT NEOPLASM OF UPPER-OUTER QUADRANT OF LEFT BREAST IN FEMALE, ESTROGEN RECEPTOR POSITIVE (HCC): Primary | ICD-10-CM

## 2021-01-15 DIAGNOSIS — C50.412 MALIGNANT NEOPLASM OF UPPER-OUTER QUADRANT OF LEFT BREAST IN FEMALE, ESTROGEN RECEPTOR POSITIVE (HCC): Primary | ICD-10-CM

## 2021-01-15 PROCEDURE — 99211 OFF/OP EST MAY X REQ PHY/QHP: CPT | Performed by: RADIOLOGY

## 2021-01-15 NOTE — PROGRESS NOTES
Aye Cleveland 1955 is a 74 y o  female     Follow up visit      Oncology History Overview Note   Guillermo Neri is a 26-year-old woman with past medical history significant for ruptured pancreatic cyst requiring diversion a 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 diagnosed with stage IA, grade 1 invasive ductal carcinoma of the left breast status post lumpectomy and sentinel lymph node biopsy achieving negative margins on 2/28/20  Tumor cells were ER/CA positive and [id number] negative  She completed a course of adjuvant radiation to the left breast on 6/8/20    Telephone follow up was done on 7/10/20    10/28/20 Jackson Mills MD  Clinically, she has no evidence recurrent disease;  continue with anastrozole     12/14/20 Bilateral diagnostic mammogram  Postoperative/radiation changes in the left breast   No evidence of malignancy  1/7/21 SELIN Floyd  No new complaints and no concerns on exam    7/8/21 SELIN Dee  11/3/21 Jackson Mills MD     Malignant neoplasm of upper-outer quadrant of left breast in female, estrogen receptor positive ([facility] [address] )   12/23/2019 Biopsy    Left breast ultrasound-guided biopsy  2 o'clock, 10 cm from nipple  Invasive mammary carcinoma of no special type  Grade 1    CA 90  HER2 1+    Concordant  Appears unifocal - difficulty assessing accurate size on mammo vs US  2 1 cm vs 1 1 cm; possible concern for adjacent mass on original US not able to be visualized on biopsy  Left axilla US negative  Right breast clear  1/17/2020 Genetic Testing    The following genes were evaluated: ALEKSANDAR, BARD1, BRCA1, BRCA2, BRIP1, CDH1, CHEK2, NBN, NF1, PALB2, PTEN, RAD50, STK11, TP53  Negative result   No pathogenic sequence variants or deletions/dupllications identified  [facility]     2/28/2020 Surgery    Left breast needle localized lumpectomy with sentinel lymph node biopsy  Invasive carcinoma of no special type  Grade 1  1 5 cm  Margins negative  0/1 Lymph node  Anatomic/Prognostic Stage IA     5/11/2020 - 6/8/2020 Radiation    entire left breast to 4005cGy in 15 daily 267cGy fractions followed by an additional  1000cGy in 5 daily fractions to the lumpectomy cavity  Total dose to the lumpectomy cavity was 5005cGy   Total number of fractions: 20       Dr Palomo Oneil: no      Health Maintenance   Topic Date Due    Hepatitis C Screening  1955    Medicare Annual Wellness Visit (AWV)  1955    Diabetic Foot Exam  01/12/1963    DM Eye Exam  01/12/1963    BMI: Followup Plan  01/12/1971    Colorectal Cancer Screening  01/12/2003    Fall Risk  01/12/2018    Influenza Vaccine (1) 09/01/2020    Depression Screening PHQ  03/25/2021    HEMOGLOBIN A1C  06/09/2021    MAMMOGRAM  12/14/2021    BMI: Adult  01/07/2022    DTaP,Tdap,and Td Vaccines (2 - Td) 07/27/2024    Pneumococcal Vaccine: 65+ Years  Completed    HIB Vaccine  Aged Out    Hepatitis B Vaccine  Aged Out    IPV Vaccine  Aged Out    Hepatitis A Vaccine  Aged Out    Meningococcal ACWY Vaccine  Aged Out    HPV Vaccine  Aged Out       Patient Active Problem List   Diagnosis    DVT of upper extremity (deep vein thrombosis) ([address] )    Type I diabetes mellitus, uncontrolled ([facility] [address] )    Encounter for gynecological examination without abnormal finding    Malignant neoplasm of upper-outer quadrant of left breast in female, estrogen receptor positive ([address] )    Use of anastrozole (Arimidex)     Past Medical History:   Diagnosis Date    BRCA gene mutation negative 01/2020    [facility]    Breast cancer ([facility] [address] ) 12/2019    left    Diabetes mellitus ([address] )     History of radiation therapy 01/2020    left breast ca    History of transfusion     2010    Hypertension     Pancreatitis     pancreatic cyst that burst    Pulmonary embolism on left [facility])     and right side     Past Surgical History:   Procedure Laterality Date    BREAST CYST EXCISION Left 2009    benign    BREAST LUMPECTOMY Left 2/28/2020    Procedure: BREAST NEEDLE LOCALIZED LUMPECTOMY (NEEDLE LOC AT 0900); Surgeon: Josafat Pathak MD;  [facility];  Service: Surgical Oncology    COLONOSCOPY      FISTULA REPAIR      small intestine    ILEOSTOMY      with reversal    LYMPH NODE BIOPSY Left 2/28/2020    Procedure: SENTINEL LYMPH NODE BIOPSY; LYMPHATIC MAPPING WITH BLUE DYE AND RADIOACTIVE DYE (INJECT AT 1100 BY DR BENOIT IN THE OR);   Surgeon: Josafat Pathak MD;  [facility];  Service: Surgical Oncology    MAMMO NEEDLE LOCALIZATION LEFT (ALL INC) Left 2/28/2020    TRACHEOSTOMY  2010    with repair    US GUIDED BREAST BIOPSY LEFT COMPLETE Left 12/23/2019     Family History   Problem Relation Age of Onset    Breast cancer Mother 43    Cervical cancer Mother 58    Breast cancer Sister 61    No Known Problems Maternal Grandmother     No Known Problems Paternal Grandmother     No Known Problems Maternal Aunt     Bone cancer Paternal Aunt 43    No Known Problems Paternal Aunt     Cancer Father         Bladder     Social History     Socioeconomic History    Marital status: /Civil Union     Spouse name: Not on file    Number of children: Not on file    Years of education: Not on file    Highest education level: Not on file   Occupational History    Not on file   Social Needs    Financial resource strain: Not on file    Food insecurity     Worry: Not on file     Inability: Not on file    Transportation needs     Medical: Not on file     Non-medical: Not on file   Tobacco Use    Smoking status: Never Smoker    Smokeless tobacco: Never Used   Substance and Sexual Activity    Alcohol use: No    Drug use: No    Sexual activity: Not Currently     Birth control/protection: Post-menopausal   Lifestyle    Physical activity     Days per week: Not on file     Minutes per session: Not on file    Stress: Not on file   Relationships    Social connections     Talks on phone: Not on file     Gets together: Not on file Attends [employer] service: Not on file     Active member of club or organization: Not on file     Attends meetings of clubs or organizations: Not on file     Relationship status: Not on file    Intimate partner violence     Fear of current or ex partner: Not on file     Emotionally abused: Not on file     Physically abused: Not on file     Forced sexual activity: Not on file   Other Topics Concern    Not on file   Social History Narrative    Not on file       Current Outpatient Medications:     anastrozole (ARIMIDEX) 1 mg tablet, Take 1 tablet (1 mg total) by mouth daily, Disp: 90 tablet, Rfl: 1    Aspirin 81 MG EC tablet, Take by mouth, Disp: , Rfl:     Calcium-Vitamin D-Vitamin K 650-12 5-40 MG-MCG-MCG CHEW, , Disp: , Rfl:     Cyanocobalamin (VITAMIN B 12) 100 MCG LOZG, Take by mouth, Disp: , Rfl:     fexofenadine (ALLEGRA) 60 MG tablet, Take by mouth, Disp: , Rfl:     hydrocortisone 0 5 % cream, Apply topically 2 (two) times a day, Disp: , Rfl:     Insulin Infusion Pump KIT, by Does not apply route , Disp: , Rfl:     LOSARTAN POTASSIUM PO, Take 50 mg by mouth, Disp: , Rfl:     NOVOLOG 100 UNIT/ML injection, , Disp: , Rfl:     Omega-3 Fatty Acids (FISH OIL) 1,000 mg, Take 1,000 mg by mouth, Disp: , Rfl:     Polyethylene Glycol 3350 (MIRALAX PO), , Disp: , Rfl:     Specialty Vitamins Products (MARTI-RX DIABETIC VITAMIN PO), Take by mouth daily, Disp: , Rfl:     Cholecalciferol (VITAMIN D3 PO), Take by mouth, Disp: , Rfl:     FREESTYLE LITE test strip, use 1 TEST STRIP to TEST BLOOD SUGAR four times a day, Disp: , Rfl:   Allergies   Allergen Reactions    Penicillins Hives    Ampicillin Rash    Sulfa Antibiotics Fatigue       Review of Systems:  Review of Systems   Constitutional: Negative  HENT: Negative  Eyes: Negative  Respiratory: Negative  Cardiovascular: Negative  Gastrointestinal: Negative  Endocrine: Negative  Musculoskeletal: Positive for arthralgias     Skin: Negative  Allergic/Immunologic: Negative  Neurological: Negative  Hematological: Negative  Psychiatric/Behavioral: Negative  Vitals:    01/15/21 1040   BP: 142/80   Pulse: 88   Resp: 16   Temp: 97 5 °F (36 4 °C)   Weight: 78 5 kg (173 lb)   Height: 5' 5" (1 651 m)        Pain Score: 0-No pain    Imaging:No results found

## 2021-01-15 NOTE — PROGRESS NOTES
Follow-up - Radiation Oncology   Dania Quiroz 1955 74 y o  female [id number]      History of Present Illness   Cancer Staging  Malignant neoplasm of upper-outer quadrant of left breast in female, estrogen receptor positive ([facility] [address] )  Staging form: Breast, AJCC 8th Edition  - Pathologic: Stage IA (pT1c, pN0, cM0, G1, ER+, TN+, HER2-) - Unsigned  Histologic grading system: 3 grade system      Dania Quiroz is a 74y o  year old female past medical history significant for ruptured pancreatic cyst requiring diversion a 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 diagnosed with stage IA, grade 1 invasive ductal carcinoma of the left breast status post lumpectomy and sentinel lymph node biopsy achieving negative margins on 2/28/20  Tumor cells were ER/TN positive and [id number] negative  She  completed a course of adjuvant radiation on 6/8/20  She is maintained on anastrazole  Today, she returns for follow-up evaluation      The patient offers no breast related complaints today  She denies new palpable nodules, suspicious skin changes, or nipple discharge of the breasts bilaterally  She notes occasional tenderness at the lumpectomy site with weather changes  No significant or persistent pain or tenderness  She has been doing massage daily  She notes that if she skips her breast tissue "stiffen" and then relax when she resumes massage  She notes mild "pulling" sensation with full extension of the left arm  Maintains full range of motion  12/14/20 Bilateral diagnostic mammogram  Postoperative/radiation changes in the left breast   No evidence of malignancy  She denies vaginal discharge of bleeding  She is maintained on anastrazole  This results in periodic hot flashes, but she otherwise she tolerates this well      Upcoming appointments:  7/8/21 SELIN Davis  11/3/21 Manisha Shannon MD      Historical Information   Oncology History Overview Note      Malignant neoplasm of upper-outer quadrant of left breast in female, estrogen receptor positive ([facility] [address] )   12/23/2019 Biopsy    Left breast ultrasound-guided biopsy  2 o'clock, 10 cm from nipple  Invasive mammary carcinoma of no special type  Grade 1    KY 90  HER2 1+    Concordant  Appears unifocal - difficulty assessing accurate size on mammo vs US  2 1 cm vs 1 1 cm; possible concern for adjacent mass on original US not able to be visualized on biopsy  Left axilla US negative  Right breast clear  1/17/2020 Genetic Testing    The following genes were evaluated: ALEKSANDAR, BARD1, BRCA1, BRCA2, BRIP1, CDH1, CHEK2, NBN, NF1, PALB2, PTEN, RAD50, STK11, TP53  Negative result  No pathogenic sequence variants or deletions/dupllications identified  [facility]     2/28/2020 Surgery    Left breast needle localized lumpectomy with sentinel lymph node biopsy  Invasive carcinoma of no special type  Grade 1  1 5 cm  Margins negative  0/1 Lymph node  Anatomic/Prognostic Stage IA     5/11/2020 - 6/8/2020 Radiation    entire left breast to 4005cGy in 15 daily 267cGy fractions followed by an additional  1000cGy in 5 daily fractions to the lumpectomy cavity  Total dose to the lumpectomy cavity was 5005cGy  Total number of fractions: 21       Dr Oseas Meneses         Past Medical History:   Diagnosis Date    BRCA gene mutation negative 01/2020    [facility]    Breast cancer ([facility] [address] ) 12/2019    left    Diabetes mellitus ([facility] [address] )     History of radiation therapy 01/2020    left breast ca    History of transfusion     2010    Hypertension     Pancreatitis     pancreatic cyst that burst    Pulmonary embolism on left [facility])     and right side     Past Surgical History:   Procedure Laterality Date    BREAST CYST EXCISION Left 2009    benign    BREAST LUMPECTOMY Left 2/28/2020    Procedure: BREAST NEEDLE LOCALIZED LUMPECTOMY (NEEDLE LOC AT 0900);   Surgeon: Josie Hunter MD;  [facility];  Service: Surgical Oncology    COLONOSCOPY      FISTULA REPAIR      small intestine    ILEOSTOMY with reversal    LYMPH NODE BIOPSY Left 2/28/2020    Procedure: SENTINEL LYMPH NODE BIOPSY; LYMPHATIC MAPPING WITH BLUE DYE AND RADIOACTIVE DYE (INJECT AT 1100 BY DR BENOIT IN THE OR); Surgeon: Alexander Skinner MD;  [facility];  Service: Surgical Oncology    MAMMO NEEDLE LOCALIZATION LEFT (ALL INC) Left 2/28/2020    TRACHEOSTOMY  2010    with repair   [address] BIOPSY LEFT COMPLETE Left 12/23/2019       Social History   Social History     Substance and Sexual Activity   Alcohol Use No     Social History     Substance and Sexual Activity   Drug Use No     Social History     Tobacco Use   Smoking Status Never Smoker   Smokeless Tobacco Never Used       Meds/Allergies     Current Outpatient Medications:     anastrozole (ARIMIDEX) 1 mg tablet, Take 1 tablet (1 mg total) by mouth daily, Disp: 90 tablet, Rfl: 1    Aspirin 81 MG EC tablet, Take by mouth, Disp: , Rfl:     Calcium-Vitamin D-Vitamin K 650-12 5-40 MG-MCG-MCG CHEW, , Disp: , Rfl:     Cyanocobalamin (VITAMIN B 12) 100 MCG LOZG, Take by mouth, Disp: , Rfl:     fexofenadine (ALLEGRA) 60 MG tablet, Take by mouth, Disp: , Rfl:     hydrocortisone 0 5 % cream, Apply topically 2 (two) times a day, Disp: , Rfl:     Insulin Infusion Pump KIT, by Does not apply route , Disp: , Rfl:     LOSARTAN POTASSIUM PO, Take 50 mg by mouth, Disp: , Rfl:     NOVOLOG 100 UNIT/ML injection, , Disp: , Rfl:     Omega-3 Fatty Acids (FISH OIL) 1,000 mg, Take 1,000 mg by mouth, Disp: , Rfl:     Polyethylene Glycol 3350 (MIRALAX PO), , Disp: , Rfl:     Specialty Vitamins Products (MARTI-RX DIABETIC VITAMIN PO), Take by mouth daily, Disp: , Rfl:     Cholecalciferol (VITAMIN D3 PO), Take by mouth, Disp: , Rfl:     FREESTYLE LITE test strip, use 1 TEST STRIP to TEST BLOOD SUGAR four times a day, Disp: , Rfl:   Allergies   Allergen Reactions    Penicillins Hives    Ampicillin Rash    Sulfa Antibiotics Fatigue       Review of Systems   Constitutional: Negative  HENT: Negative  Eyes: Negative  Respiratory: Negative  Cardiovascular: Negative  Gastrointestinal: Negative  Endocrine: Negative  Musculoskeletal: Positive for arthralgias  Skin: Negative  Allergic/Immunologic: Negative  Neurological: Negative  Hematological: Negative  Psychiatric/Behavioral: Negative  OBJECTIVE:   /80   Pulse 88   Temp 97 5 °F (36 4 °C)   Resp 16   Ht 5' 5" (1 651 m)   Wt 78 5 kg (173 lb)   LMP  (LMP Unknown)   BMI 28 79 kg/m²   Karnofsky: 90 - Able to carry on normal activity; minor signs or symptoms of disease     Physical Exam  Vitals signs and nursing note reviewed  Constitutional:       General: She is not in acute distress  Appearance: She is well-developed  Eyes:      General: No scleral icterus  Cardiovascular:      Rate and Rhythm: Normal rate and regular rhythm  Heart sounds: No murmur  Pulmonary:      Effort: Pulmonary effort is normal  No respiratory distress  Breath sounds: No wheezing, rhonchi or rales  Chest:      Comments: Breast examination demonstrates the left breast to be slightly smaller in size, contours are symmetric  There is a well-healed lumpectomy scar in the lateral left breast with a second well healed axillary scar   There is diffuse fibrosis of the left breast, dense in the inframammary fold  There are no palpable masses or suspicious skin changes of the breasts bilaterally  Abdominal:      General: There is no distension  Palpations: Abdomen is soft  Tenderness: There is no abdominal tenderness  Comments: Abdominal wound covered in the left lower quadrant  Area is clean and dry   Musculoskeletal:      Right lower leg: No edema  Left lower leg: No edema  Comments: No upper extremity edema bilaterally  Full range of motion of the upper extremities bilaterally  Lymphadenopathy:      Cervical: No cervical adenopathy        Upper Body:      Right upper body: No supraclavicular adenopathy  Left upper body: No supraclavicular adenopathy  Neurological:      Mental Status: She is alert and oriented to person, place, and time  Gait: Gait normal           Assessment/Plan:  Sheridan Jeffrey is a 74y o  year old female past medical history significant for ruptured pancreatic cyst requiring diversion a 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 diagnosed with stage IA, grade 1 invasive ductal carcinoma of the left breast status post resection followed by adjuvant radiation completed in June 2020  Tumor cells were ER/IN positive and [id number] negative and is maintained on anastrazole  She remains clinically and radiographically FRANCO  She will be due for mammogram in December 2021  The patient has full range of motion, but notes "tightness" with motion of her left arm  I recommended stretching exercises to help reduce this sensation  I offered her referral to PT if symptoms should progress  She has moderate fibrosis of the left breast   I recommended continued daily massage as she notes improvement and comfort with this measure  I instructed her to practice sun protection to the irradiated skin  She will return for follow-up in 6 months  We are available for any questions or concerns that may arise in the interim  Catrachito Harris MD  1/15/2021,11:30 AM    Portions of the record may have been created with voice recognition software   Occasional wrong word or "sound a like" substitutions may have occurred due to the inherent limitations of voice recognition software   Read the chart carefully and recognize, using context, where substitutions have occurred

## 2021-03-10 DIAGNOSIS — Z23 ENCOUNTER FOR IMMUNIZATION: ICD-10-CM

## 2021-04-09 DIAGNOSIS — Z17.0 MALIGNANT NEOPLASM OF UPPER-OUTER QUADRANT OF LEFT BREAST IN FEMALE, ESTROGEN RECEPTOR POSITIVE (HCC): ICD-10-CM

## 2021-04-09 DIAGNOSIS — C50.412 MALIGNANT NEOPLASM OF UPPER-OUTER QUADRANT OF LEFT BREAST IN FEMALE, ESTROGEN RECEPTOR POSITIVE (HCC): ICD-10-CM

## 2021-04-09 RX ORDER — ANASTROZOLE 1 MG/1
TABLET ORAL
Qty: 90 TABLET | Refills: 1 | Status: SHIPPED | OUTPATIENT
Start: 2021-04-09 | End: 2021-09-25

## 2021-07-08 ENCOUNTER — OFFICE VISIT (OUTPATIENT)
Dept: SURGICAL ONCOLOGY | Facility: CLINIC | Age: 66
End: 2021-07-08
Payer: MEDICARE

## 2021-07-08 VITALS
WEIGHT: 170.5 LBS | HEIGHT: 65 IN | DIASTOLIC BLOOD PRESSURE: 84 MMHG | OXYGEN SATURATION: 100 % | HEART RATE: 81 BPM | SYSTOLIC BLOOD PRESSURE: 152 MMHG | TEMPERATURE: 96.9 F | BODY MASS INDEX: 28.41 KG/M2

## 2021-07-08 DIAGNOSIS — C50.412 MALIGNANT NEOPLASM OF UPPER-OUTER QUADRANT OF LEFT BREAST IN FEMALE, ESTROGEN RECEPTOR POSITIVE (HCC): Primary | ICD-10-CM

## 2021-07-08 DIAGNOSIS — Z79.811 USE OF ANASTROZOLE (ARIMIDEX): ICD-10-CM

## 2021-07-08 DIAGNOSIS — Z17.0 MALIGNANT NEOPLASM OF UPPER-OUTER QUADRANT OF LEFT BREAST IN FEMALE, ESTROGEN RECEPTOR POSITIVE (HCC): Primary | ICD-10-CM

## 2021-07-08 PROCEDURE — 99214 OFFICE O/P EST MOD 30 MIN: CPT | Performed by: NURSE PRACTITIONER

## 2021-07-08 RX ORDER — INSULIN PUMP CART,CONT INF,RF
CARTRIDGE (EA) SUBCUTANEOUS
COMMUNITY
Start: 2021-06-18

## 2021-07-08 NOTE — PROGRESS NOTES
Surgical Oncology Follow Up       [address] [place] [address],[address]  [facility]  [address] [facility] [address]  [facility] [address]    Johny Carrington  1955  [id number]  [address] [facility],[address] [facility]  [facility]GICAL [facility]COL[facility] [place]  [address] [facility] [address]    Chief Complaint   Patient presents with    Follow-up     6 month        Assessment/Plan:  1  Malignant neoplasm of upper-outer quadrant of left breast in female, estrogen receptor positive ([facility] [address] )  - Mammo diagnostic bilateral w 3d & cad; Future  - 6 mo f/u visit    2  Use of anastrozole (Arimidex)  - Continue use per medical oncology    Discussion/Summary: Patient is a 58-year-old female who presents today for a six-month follow-up visit for left breast cancer diagnosed in December of 2019  Her pathology revealed invasive mammary carcinoma, %, WI 90%, her 2-   She underwent genetic testing which was negative   She underwent a left lumpectomy and sentinel node biopsy by Dr Verl Donning Collene Goldmann completed whole breast radiation and is currently taking anastrozole  She had a bilateral mammogram on December 14, 2020 which was BI-RADS 2, category 3 density  She offers no new complaints today and there are no concerns on today's exam   I will order her bilateral mammogram for December and plan to see her back in 6 months or sooner should the need arise  She was instructed to call with any new concerns or symptoms prior to that time  All of her questions were answered today      History of Present Illness:     Oncology History   Malignant neoplasm of upper-outer quadrant of left breast in female, estrogen receptor positive ([facility] [address] )   12/23/2019 Biopsy    Left breast ultrasound-guided biopsy  2 o'clock, 10 cm from nipple  Invasive mammary carcinoma of no special type  Grade 1    WI 90  HER2 1+    Concordant   Appears unifocal - difficulty assessing accurate size on mammo vs US  2 1 cm vs 1 1 cm; possible concern for adjacent mass on original US not able to be visualized on biopsy  Left axilla US negative  Right breast clear  1/17/2020 Genetic Testing    The following genes were evaluated: ALEKSANDAR, BARD1, BRCA1, BRCA2, BRIP1, CDH1, CHEK2, NBN, NF1, PALB2, PTEN, RAD50, STK11, TP53  Negative result  No pathogenic sequence variants or deletions/dupllications identified  [employer]     2/28/2020 Surgery    Left breast needle localized lumpectomy with sentinel lymph node biopsy  Invasive carcinoma of no special type  Grade 1  1 5 cm  Margins negative  0/1 Lymph node  Anatomic/Prognostic Stage IA     4/22/2020 -  Hormone Therapy    Anastrozole 1 mg daily  Dr Ricarda Dang     5/11/2020 - 6/8/2020 Radiation    entire left breast to 4005cGy in 15 daily 267cGy fractions followed by an additional  1000cGy in 5 daily fractions to the lumpectomy cavity  Total dose to the lumpectomy cavity was 5005cGy  Total number of fractions: 20       Dr Gomez Riding          -Interval History:  Patient notices no changes on self-exam   She denies persistent headaches, back pain or bone pain, cough or shortness of breath, abdominal pain  She continues on anastrozole  She reports occasional hot flashes  Review of Systems:  Review of Systems   Constitutional: Negative for activity change, appetite change, chills, fatigue, fever and unexpected weight change  Respiratory: Negative for cough and shortness of breath  Cardiovascular: Negative for chest pain  Gastrointestinal: Negative for abdominal pain, constipation, diarrhea, nausea and vomiting  Musculoskeletal: Negative for arthralgias, back pain, gait problem and myalgias  Skin: Negative for color change and rash  Neurological: Negative for dizziness and headaches  Hematological: Negative for adenopathy  Psychiatric/Behavioral: Negative for agitation and confusion  All other systems reviewed and are negative        Patient Active Problem List   Diagnosis    DVT of upper extremity (deep vein thrombosis) ([facility] [address] )    Type I diabetes mellitus, uncontrolled ([facility] [address] )    Encounter for gynecological examination without abnormal finding    Malignant neoplasm of upper-outer quadrant of left breast in female, estrogen receptor positive ([facility] [address] )    Use of anastrozole (Arimidex)     Past Medical History:   Diagnosis Date    BRCA gene mutation negative 01/2020    [facility]    Breast cancer ([facility] [address] ) 12/2019    left    Diabetes mellitus ([facility] [address] )     History of radiation therapy 01/2020    left breast ca    History of transfusion     2010    Hypertension     Pancreatitis     pancreatic cyst that burst    Pulmonary embolism on left [facility])     and right side     Past Surgical History:   Procedure Laterality Date    BREAST CYST EXCISION Left 2009    benign    BREAST LUMPECTOMY Left 2/28/2020    Procedure: BREAST NEEDLE LOCALIZED LUMPECTOMY (NEEDLE LOC AT 0900); Surgeon: Neil Wallis MD;  [facility];  Service: Surgical Oncology    COLONOSCOPY      FISTULA REPAIR      small intestine    ILEOSTOMY      with reversal    LYMPH NODE BIOPSY Left 2/28/2020    Procedure: SENTINEL LYMPH NODE BIOPSY; LYMPHATIC MAPPING WITH BLUE DYE AND RADIOACTIVE DYE (INJECT AT 1100 BY DR BENOIT IN THE OR);   Surgeon: Neil Wallis MD;  [facility];  Service: Surgical Oncology    MAMMO NEEDLE LOCALIZATION LEFT (ALL INC) Left 2/28/2020    TRACHEOSTOMY  2010    with repair    US GUIDED BREAST BIOPSY LEFT COMPLETE Left 12/23/2019     Family History   Problem Relation Age of Onset    Breast cancer Mother 43    Cervical cancer Mother 58    Breast cancer Sister 61    No Known Problems Maternal Grandmother     No Known Problems Paternal Grandmother     No Known Problems Maternal Aunt     Bone cancer Paternal Aunt 43    No Known Problems Paternal Aunt     Cancer Father         Bladder     Social History     Socioeconomic History    Marital status: /Civil Union     Spouse name: Not on file    Number of children: Not on file  Years of education: Not on file    Highest education level: Not on file   Occupational History    Not on file   Tobacco Use    Smoking status: Never Smoker    Smokeless tobacco: Never Used   Vaping Use    Vaping Use: Never used   Substance and Sexual Activity    Alcohol use: No    Drug use: No    Sexual activity: Not Currently     Birth control/protection: Post-menopausal   Other Topics Concern    Not on file   Social History Narrative    Not on file     Social Determinants of Health     Financial Resource Strain:     Difficulty of Paying Living Expenses:    Food Insecurity:     Worried About Running Out of Food in the Last Year:     Ran Out of Food in the Last Year:    Transportation Needs:     Lack of Transportation (Medical):      Lack of Transportation (Non-Medical):    Physical Activity:     Days of Exercise per Week:     Minutes of Exercise per Session:    Stress:     Feeling of Stress :    Social Connections:     Frequency of Communication with Friends and Family:     Frequency of Social Gatherings with Friends and Family:     Attends [employer] Services:     Active Member of Clubs or Organizations:     Attends Club or Organization Meetings:     Marital Status:    Intimate Partner Violence:     Fear of Current or Ex-Partner:     Emotionally Abused:     Physically Abused:     Sexually Abused:        Current Outpatient Medications:     anastrozole (ARIMIDEX) 1 mg tablet, take 1 tablet by mouth once daily, Disp: 90 tablet, Rfl: 1    Aspirin 81 MG EC tablet, Take by mouth, Disp: , Rfl:     Calcium-Vitamin D-Vitamin K 650-12 5-40 MG-MCG-MCG CHEW, , Disp: , Rfl:     Cholecalciferol (VITAMIN D3 PO), Take by mouth, Disp: , Rfl:     Cyanocobalamin (VITAMIN B 12) 100 MCG LOZG, Take by mouth, Disp: , Rfl:     fexofenadine (ALLEGRA) 60 MG tablet, Take by mouth, Disp: , Rfl:     FREESTYLE LITE test strip, use 1 TEST STRIP to TEST BLOOD SUGAR four times a day, Disp: , Rfl:    hydrocortisone 0 5 % cream, Apply topically 2 (two) times a day, Disp: , Rfl:     Insulin Disposable Pump (OmniPod 5 Pack) MISC, CHANGE POD EVERY 3 DAYS, Disp: , Rfl:     Insulin Infusion Pump KIT, by Does not apply route , Disp: , Rfl:     LOSARTAN POTASSIUM PO, Take 50 mg by mouth, Disp: , Rfl:     NOVOLOG 100 UNIT/ML injection, , Disp: , Rfl:     Omega-3 Fatty Acids (FISH OIL) 1,000 mg, Take 1,000 mg by mouth, Disp: , Rfl:     Polyethylene Glycol 3350 (MIRALAX PO), , Disp: , Rfl:     Specialty Vitamins Products (MARTI-RX DIABETIC VITAMIN PO), Take by mouth daily, Disp: , Rfl:   Allergies   Allergen Reactions    Penicillins Hives    Ampicillin Rash    Sulfa Antibiotics Fatigue     Vitals:    07/08/21 0918   BP: 152/84   Pulse: 81   Temp: (!) 96 9 °F (36 1 °C)   SpO2: 100%       Physical Exam  Vitals reviewed  Constitutional:       General: She is not in acute distress  Appearance: Normal appearance  She is well-developed  She is not diaphoretic  HENT:      Head: Normocephalic and atraumatic  Cardiovascular:      Rate and Rhythm: Normal rate and regular rhythm  Heart sounds: Normal heart sounds  Pulmonary:      Effort: Pulmonary effort is normal       Breath sounds: Normal breath sounds  Chest:      Breasts:         Right: No swelling, bleeding, inverted nipple, mass, nipple discharge, skin change or tenderness  Left: Skin change (surgical scars) present  No swelling, bleeding, inverted nipple, mass, nipple discharge or tenderness  Abdominal:      Palpations: Abdomen is soft  There is no mass  Tenderness: There is no abdominal tenderness  Comments: fistula   Musculoskeletal:         General: Normal range of motion  Cervical back: Normal range of motion  Lymphadenopathy:      Upper Body:      Right upper body: No supraclavicular or axillary adenopathy  Left upper body: No supraclavicular or axillary adenopathy  Skin:     General: Skin is warm and dry  Findings: No rash  Neurological:      Mental Status: She is alert and oriented to person, place, and time  Psychiatric:         Speech: Speech normal            Advance Care Planning/Advance Directives:  Discussed disease status, cancer treatment plans and/or cancer treatment goals with the patient

## 2021-07-09 ENCOUNTER — CLINICAL SUPPORT (OUTPATIENT)
Dept: RADIATION ONCOLOGY | Facility: CLINIC | Age: 66
End: 2021-07-09
Attending: RADIOLOGY
Payer: MEDICARE

## 2021-07-09 VITALS
HEIGHT: 65 IN | RESPIRATION RATE: 16 BRPM | WEIGHT: 171 LBS | BODY MASS INDEX: 28.49 KG/M2 | SYSTOLIC BLOOD PRESSURE: 162 MMHG | HEART RATE: 75 BPM | DIASTOLIC BLOOD PRESSURE: 80 MMHG

## 2021-07-09 DIAGNOSIS — C50.412 MALIGNANT NEOPLASM OF UPPER-OUTER QUADRANT OF LEFT BREAST IN FEMALE, ESTROGEN RECEPTOR POSITIVE (HCC): Primary | ICD-10-CM

## 2021-07-09 DIAGNOSIS — Z17.0 MALIGNANT NEOPLASM OF UPPER-OUTER QUADRANT OF LEFT BREAST IN FEMALE, ESTROGEN RECEPTOR POSITIVE (HCC): Primary | ICD-10-CM

## 2021-07-09 PROCEDURE — 99213 OFFICE O/P EST LOW 20 MIN: CPT | Performed by: RADIOLOGY

## 2021-07-09 PROCEDURE — 99211 OFF/OP EST MAY X REQ PHY/QHP: CPT | Performed by: RADIOLOGY

## 2021-07-09 NOTE — PROGRESS NOTES
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 diagnosed with stage IA, grade 1 invasive ductal carcinoma of the left breast status post lumpectomy and sentinel lymph node biopsy achieving negative margins on 20  Tumor cells were ER/IL positive and [id number] negative   She completed a course of adjuvant radiation to the left breast on 20  She is maintained on anastrazole  Today, she returns for follow-up evaluation  The patient offers no breast related complaints today  She denies new palpable nodules, suspicious skin changes, or nipple discharge of the breasts bilaterally   No significant or persistent pain or tenderness        20 Bilateral diagnostic mammogram was benign (BIRADS 2)      She denies vaginal discharge of bleeding   She is maintained on anastrazole  She tolerates this well       21 SELIN Paz exam was unremarkable  Upcomin/3/21 Jeovany Gonsalves MD annual follow up  12/15/21 Bilateral diagnostic mammogram scheduled  22 SELIN Paz      Historical Information   Oncology History   Malignant neoplasm of upper-outer quadrant of left breast in female, estrogen receptor positive ([facility] [address] )   2019 Biopsy    Left breast ultrasound-guided biopsy  2 o'clock, 10 cm from nipple  Invasive mammary carcinoma of no special type  Grade 1    IL 90  HER2 1+    Concordant   Appears unifocal - difficulty assessing accurate size on mammo vs US  2 1 cm vs 1 1 cm; possible concern for adjacent mass on original US not able to be visualized on biopsy  Left axilla US negative  Right breast clear  1/17/2020 Genetic Testing    The following genes were evaluated: ALEKSANDAR, BARD1, BRCA1, BRCA2, BRIP1, CDH1, CHEK2, NBN, NF1, PALB2, PTEN, RAD50, STK11, TP53  Negative result  No pathogenic sequence variants or deletions/dupllications identified  [facility]     2/28/2020 Surgery    Left breast needle localized lumpectomy with sentinel lymph node biopsy  Invasive carcinoma of no special type  Grade 1  1 5 cm  Margins negative  0/1 Lymph node  Anatomic/Prognostic Stage IA     4/22/2020 -  Hormone Therapy    Anastrozole 1 mg daily  Dr Rosemarie Byers     5/11/2020 - 6/8/2020 Radiation    entire left breast to 4005cGy in 15 daily 267cGy fractions followed by an additional  1000cGy in 5 daily fractions to the lumpectomy cavity  Total dose to the lumpectomy cavity was 5005cGy  Total number of fractions: 21       Dr Venkatesh Palma         Past Medical History:   Diagnosis Date    BRCA gene mutation negative 01/2020    [facility]    Breast cancer ([facility] [address] ) 12/2019    left    Diabetes mellitus ([facility] [address] )     History of radiation therapy 01/2020    left breast ca    History of transfusion     2010    Hypertension     Pancreatitis     pancreatic cyst that burst    Pulmonary embolism on left [facility])     and right side     Past Surgical History:   Procedure Laterality Date    BREAST CYST EXCISION Left 2009    benign    BREAST LUMPECTOMY Left 2/28/2020    Procedure: BREAST NEEDLE LOCALIZED LUMPECTOMY (NEEDLE LOC AT 0900); Surgeon: Alem Valentine MD;  [facility];  Service: Surgical Oncology    COLONOSCOPY      FISTULA REPAIR      small intestine    ILEOSTOMY      with reversal    LYMPH NODE BIOPSY Left 2/28/2020    Procedure: SENTINEL LYMPH NODE BIOPSY; LYMPHATIC MAPPING WITH BLUE DYE AND RADIOACTIVE DYE (INJECT AT 1100 BY DR BENOIT IN THE OR);   Surgeon: Alem Valentine MD;  [facility];  Service: Surgical Oncology    MAMMO NEEDLE LOCALIZATION LEFT (ALL INC) Left 2/28/2020    TRACHEOSTOMY  2010    with repair    US GUIDED BREAST BIOPSY LEFT COMPLETE Left 12/23/2019       Social History   Social History     Substance and Sexual Activity   Alcohol Use No     Social History     Substance and Sexual Activity   Drug Use No     Social History     Tobacco Use   Smoking Status Never Smoker   Smokeless Tobacco Never Used       Meds/Allergies     Current Outpatient Medications:     anastrozole (ARIMIDEX) 1 mg tablet, take 1 tablet by mouth once daily, Disp: 90 tablet, Rfl: 1    Aspirin 81 MG EC tablet, Take by mouth, Disp: , Rfl:     Calcium-Vitamin D-Vitamin K 650-12 5-40 MG-MCG-MCG CHEW, , Disp: , Rfl:     Cholecalciferol (VITAMIN D3 PO), Take by mouth, Disp: , Rfl:     Cyanocobalamin (VITAMIN B 12) 100 MCG LOZG, Take by mouth, Disp: , Rfl:     fexofenadine (ALLEGRA) 60 MG tablet, Take by mouth, Disp: , Rfl:     hydrocortisone 0 5 % cream, Apply topically 2 (two) times a day, Disp: , Rfl:     Insulin Disposable Pump (OmniPod 5 Pack) MISC, CHANGE POD EVERY 3 DAYS, Disp: , Rfl:     Insulin Infusion Pump KIT, by Does not apply route , Disp: , Rfl:     LOSARTAN POTASSIUM PO, Take 50 mg by mouth, Disp: , Rfl:     NOVOLOG 100 UNIT/ML injection, , Disp: , Rfl:     Omega-3 Fatty Acids (FISH OIL) 1,000 mg, Take 1,000 mg by mouth, Disp: , Rfl:     Polyethylene Glycol 3350 (MIRALAX PO), , Disp: , Rfl:     Specialty Vitamins Products (MARTI-RX DIABETIC VITAMIN PO), Take by mouth daily, Disp: , Rfl:     FREESTYLE LITE test strip, use 1 TEST STRIP to TEST BLOOD SUGAR four times a day, Disp: , Rfl:   Allergies   Allergen Reactions    Penicillins Hives    Ampicillin Rash    Sulfa Antibiotics Fatigue       Review of Systems   Constitutional: Negative  HENT: Negative  Eyes: Negative  Respiratory: Negative  Cardiovascular: Negative  Gastrointestinal: Negative  Endocrine: Negative      Genitourinary: Negative  Musculoskeletal: Positive for arthralgias  Skin: Negative  Allergic/Immunologic: Negative  Neurological: Negative  Hematological: Negative  Psychiatric/Behavioral: Negative  OBJECTIVE:   /80   Pulse 75   Resp 16   Ht 5' 5" (1 651 m)   Wt 77 6 kg (171 lb)   LMP  (LMP Unknown)   BMI 28 46 kg/m²   ECOG/Zubrod/WHO: 0 - Asymptomatic    Physical Exam  Vitals and nursing note reviewed  Constitutional:       General: She is not in acute distress  Appearance: She is well-developed  Eyes:      General: No scleral icterus  Cardiovascular:      Rate and Rhythm: Normal rate and regular rhythm  Heart sounds: No murmur heard  Pulmonary:      Effort: Pulmonary effort is normal  No respiratory distress  Breath sounds: No wheezing, rhonchi or rales  Chest:      Comments: Breast examination demonstrates the patient to be symmetric in contour  The left breast is slightly smaller in size  There is mild diffuse fibrosis  There is a well-healed lumpectomy scar in the lateral left breast with a second well healed axillary scar   There are no palpable masses or suspicious skin changes of the breasts bilaterally  Abdominal:      General: There is no distension  Palpations: Abdomen is soft  Tenderness: There is no abdominal tenderness  Musculoskeletal:      Right lower leg: No edema  Left lower leg: No edema  Comments: Full range of motion of upper extremities bilaterally  No upper extremity edema bilaterally  Lymphadenopathy:      Cervical: No cervical adenopathy  Upper Body:      Right upper body: No supraclavicular or axillary adenopathy  Left upper body: No supraclavicular or axillary adenopathy  Neurological:      Mental Status: She is alert and oriented to person, place, and time        Gait: Gait normal           Assessment/Plan:  Kamila Flowers is a 74y o  year old female with history of stage IA, grade 1 invasive ductal carcinoma of the left breast status post resection followed by adjuvant radiation completed in June 2020   Tumor cells were ER/NH positive and MFB1Maq negative and is maintained on anastrazole  She remains clinically and radiographically FRANCO  Overall, she is doing well  I recommended practicing sun protection of the irradiated skin    I have asked her to return for follow-up in 6 months  She will see her surgeon and Medical Oncologist in the interim  We will see her sooner if the need arises  Luís Tran MD  7/9/2021,1:12 PM    Portions of the record may have been created with voice recognition software   Occasional wrong word or "sound a like" substitutions may have occurred due to the inherent limitations of voice recognition software   Read the chart carefully and recognize, using context, where substitutions have occurred

## 2021-07-09 NOTE — PROGRESS NOTES
Lasandra Kussmaul 1955 is a 74 y o  female     Follow up visit    Diomedes Langford is a 27-year-old woman with past medical history significant for ruptured pancreatic cyst requiring diversion a 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 diagnosed with stage IA, grade 1 invasive ductal carcinoma of the left breast status post lumpectomy and sentinel lymph node biopsy achieving negative margins on 2/28/20  Tumor cells were ER/WA positive and CBX1Ojl negative  She completed a course of adjuvant radiation to the left breast on 6/8/20    She was last seen in followup on 1/15/21    7/8/21 [address] [facility]  no new complaints today and there are no concerns on today's exam      11/3/21 Viviane Ruiz MD annual follow up    12/15/21 Bilateral diagnostic mammogram scheduled  1/27/22 SELIN Nicole    Oncology History   Malignant neoplasm of upper-outer quadrant of left breast in female, estrogen receptor positive ([facility] [address] )   12/23/2019 Biopsy    Left breast ultrasound-guided biopsy  2 o'clock, 10 cm from nipple  Invasive mammary carcinoma of no special type  Grade 1    WA 90  HER2 1+    Concordant  Appears unifocal - difficulty assessing accurate size on mammo vs US  2 1 cm vs 1 1 cm; possible concern for adjacent mass on original US not able to be visualized on biopsy  Left axilla US negative  Right breast clear  1/17/2020 Genetic Testing    The following genes were evaluated: ALEKSANDAR, BARD1, BRCA1, BRCA2, BRIP1, CDH1, CHEK2, NBN, NF1, PALB2, PTEN, RAD50, STK11, TP53  Negative result   No pathogenic sequence variants or deletions/dupllications identified  [facility]     2/28/2020 Surgery    Left breast needle localized lumpectomy with sentinel lymph node biopsy  Invasive carcinoma of no special type  Grade 1  1 5 cm  Margins negative  0/1 Lymph node  Anatomic/Prognostic Stage IA     4/22/2020 -  Hormone Therapy    Anastrozole 1 mg daily  Dr Jaspreet Mccarthy     5/11/2020 - 6/8/2020 Radiation    entire left breast to 4005cGy in 15 daily 267cGy fractions followed by an additional  1000cGy in 5 daily fractions to the lumpectomy cavity  Total dose to the lumpectomy cavity was 5005cGy   Total number of fractions: 20       Dr Karley Espinosa: no    Health Maintenance   Topic Date Due    Hepatitis C Screening  Never done   Mahala Levy Medicare Annual Wellness Visit (AWV)  Never done    Diabetic Foot Exam  Never done    DM Eye Exam  Never done    BMI: Followup Plan  Never done    Colorectal Cancer Screening  Never done    Fall Risk  Never done    Influenza Vaccine (1) 09/01/2021    MAMMOGRAM  12/14/2021    HEMOGLOBIN A1C  01/01/2022    BMI: Adult  07/08/2022    Depression Screening PHQ  07/09/2022    DTaP,Tdap,and Td Vaccines (2 - Td or Tdap) 07/27/2024    Pneumococcal Vaccine: 65+ Years  Completed    COVID-19 Vaccine  Completed    HIB Vaccine  Aged Out    Hepatitis B Vaccine  Aged Out    IPV Vaccine  Aged Out    Hepatitis A Vaccine  Aged Out    Meningococcal ACWY Vaccine  Aged Out    HPV Vaccine  Aged Out       Patient Active Problem List   Diagnosis    DVT of upper extremity (deep vein thrombosis) ([facility] [address] )    Type I diabetes mellitus, uncontrolled ([facility] [address] )    Encounter for gynecological examination without abnormal finding    Malignant neoplasm of upper-outer quadrant of left breast in female, estrogen receptor positive ([facility] [address] )    Use of anastrozole (Arimidex)     Past Medical History:   Diagnosis Date    BRCA gene mutation negative 01/2020    [facility]    Breast cancer ([facility] [address] ) 12/2019    left    Diabetes mellitus ([facility] [address] )     History of radiation therapy 01/2020    left breast ca    History of transfusion     2010    Hypertension     Pancreatitis     pancreatic cyst that burst    Pulmonary embolism on left [facility])     and right side     Past Surgical History:   Procedure Laterality Date    BREAST CYST EXCISION Left 2009    benign    BREAST LUMPECTOMY Left 2/28/2020    Procedure: BREAST NEEDLE LOCALIZED LUMPECTOMY (NEEDLE LOC AT 0900); Surgeon: Silverio Shafer MD;  [facility];  Service: Surgical Oncology    COLONOSCOPY      FISTULA REPAIR      small intestine    ILEOSTOMY      with reversal    LYMPH NODE BIOPSY Left 2/28/2020    Procedure: SENTINEL LYMPH NODE BIOPSY; LYMPHATIC MAPPING WITH BLUE DYE AND RADIOACTIVE DYE (INJECT AT 1100 BY DR BENOIT IN THE OR); Surgeon: Silverio Shafer MD;  [facility];  Service: Surgical Oncology    MAMMO NEEDLE LOCALIZATION LEFT (ALL INC) Left 2/28/2020    TRACHEOSTOMY  2010    with repair    US GUIDED BREAST BIOPSY LEFT COMPLETE Left 12/23/2019     Family History   Problem Relation Age of Onset    Breast cancer Mother 43    Cervical cancer Mother 58    Breast cancer Sister 61    No Known Problems Maternal Grandmother     No Known Problems Paternal Grandmother     No Known Problems Maternal Aunt     Bone cancer Paternal Aunt 43    No Known Problems Paternal Aunt     Cancer Father         Bladder     Social History     Socioeconomic History    Marital status: /Civil Union     Spouse name: Not on file    Number of children: Not on file    Years of education: Not on file    Highest education level: Not on file   Occupational History    Not on file   Tobacco Use    Smoking status: Never Smoker    Smokeless tobacco: Never Used   Vaping Use    Vaping Use: Never used   Substance and Sexual Activity    Alcohol use: No    Drug use: No    Sexual activity: Not Currently     Birth control/protection: Post-menopausal   Other Topics Concern    Not on file   Social History Narrative    Not on file     Social Determinants of Health     Financial Resource Strain:     Difficulty of Paying Living Expenses:    Food Insecurity:     Worried About Running Out of Food in the Last Year:     Ran Out of Food in the Last Year:    Transportation Needs:     Lack of Transportation (Medical):      Lack of Transportation (Non-Medical):    Physical Activity:     Days of Cardiovascular: Negative  Gastrointestinal: Negative  Endocrine: Negative  Genitourinary: Negative  Musculoskeletal: Positive for arthralgias  Skin: Negative  Allergic/Immunologic: Negative  Neurological: Negative  Hematological: Negative  Psychiatric/Behavioral: Negative  Vitals:    07/09/21 1234   BP: 162/80   Pulse: 75   Resp: 16   Weight: 77 6 kg (171 lb)   Height: 5' 5" (1 651 m)    Oxygen 98%    Pain Score: 0-No pain    Imaging:No results found

## 2021-09-25 DIAGNOSIS — Z17.0 MALIGNANT NEOPLASM OF UPPER-OUTER QUADRANT OF LEFT BREAST IN FEMALE, ESTROGEN RECEPTOR POSITIVE (HCC): ICD-10-CM

## 2021-09-25 DIAGNOSIS — C50.412 MALIGNANT NEOPLASM OF UPPER-OUTER QUADRANT OF LEFT BREAST IN FEMALE, ESTROGEN RECEPTOR POSITIVE (HCC): ICD-10-CM

## 2021-09-25 RX ORDER — ANASTROZOLE 1 MG/1
TABLET ORAL
Qty: 90 TABLET | Refills: 1 | Status: SHIPPED | OUTPATIENT
Start: 2021-09-25 | End: 2022-04-04

## 2021-11-03 ENCOUNTER — OFFICE VISIT (OUTPATIENT)
Dept: HEMATOLOGY ONCOLOGY | Facility: CLINIC | Age: 66
End: 2021-11-03
Payer: MEDICARE

## 2021-11-03 VITALS
HEIGHT: 65 IN | SYSTOLIC BLOOD PRESSURE: 134 MMHG | BODY MASS INDEX: 28.74 KG/M2 | DIASTOLIC BLOOD PRESSURE: 72 MMHG | HEART RATE: 86 BPM | OXYGEN SATURATION: 98 % | WEIGHT: 172.5 LBS | TEMPERATURE: 96.6 F | RESPIRATION RATE: 18 BRPM

## 2021-11-03 DIAGNOSIS — Z17.0 MALIGNANT NEOPLASM OF UPPER-OUTER QUADRANT OF LEFT BREAST IN FEMALE, ESTROGEN RECEPTOR POSITIVE (HCC): Primary | ICD-10-CM

## 2021-11-03 DIAGNOSIS — C50.412 MALIGNANT NEOPLASM OF UPPER-OUTER QUADRANT OF LEFT BREAST IN FEMALE, ESTROGEN RECEPTOR POSITIVE (HCC): Primary | ICD-10-CM

## 2021-11-03 PROCEDURE — 99214 OFFICE O/P EST MOD 30 MIN: CPT | Performed by: INTERNAL MEDICINE

## 2021-11-03 RX ORDER — LOSARTAN POTASSIUM 50 MG/1
50 TABLET ORAL DAILY
COMMUNITY
Start: 2021-09-20

## 2021-12-15 ENCOUNTER — HOSPITAL ENCOUNTER (OUTPATIENT)
Dept: MAMMOGRAPHY | Facility: CLINIC | Age: 66
Discharge: HOME | End: 2021-12-15
Payer: MEDICARE

## 2021-12-15 VITALS — HEIGHT: 65 IN | WEIGHT: 172 LBS | BODY MASS INDEX: 28.66 KG/M2

## 2021-12-15 DIAGNOSIS — Z17.0 MALIGNANT NEOPLASM OF UPPER-OUTER QUADRANT OF LEFT BREAST IN FEMALE, ESTROGEN RECEPTOR POSITIVE (HCC): ICD-10-CM

## 2021-12-15 DIAGNOSIS — C50.412 MALIGNANT NEOPLASM OF UPPER-OUTER QUADRANT OF LEFT BREAST IN FEMALE, ESTROGEN RECEPTOR POSITIVE (HCC): ICD-10-CM

## 2021-12-15 PROCEDURE — G0279 TOMOSYNTHESIS, MAMMO: HCPCS

## 2021-12-15 PROCEDURE — 77066 DX MAMMO INCL CAD BI: CPT

## 2022-01-27 ENCOUNTER — OFFICE VISIT (OUTPATIENT)
Dept: SURGICAL ONCOLOGY | Facility: CLINIC | Age: 67
End: 2022-01-27
Payer: MEDICARE

## 2022-01-27 VITALS
RESPIRATION RATE: 18 BRPM | HEART RATE: 70 BPM | SYSTOLIC BLOOD PRESSURE: 132 MMHG | DIASTOLIC BLOOD PRESSURE: 76 MMHG | BODY MASS INDEX: 28.49 KG/M2 | HEIGHT: 65 IN | WEIGHT: 171 LBS | OXYGEN SATURATION: 100 %

## 2022-01-27 DIAGNOSIS — Z79.811 USE OF ANASTROZOLE (ARIMIDEX): ICD-10-CM

## 2022-01-27 DIAGNOSIS — C50.412 MALIGNANT NEOPLASM OF UPPER-OUTER QUADRANT OF LEFT BREAST IN FEMALE, ESTROGEN RECEPTOR POSITIVE (HCC): Primary | ICD-10-CM

## 2022-01-27 DIAGNOSIS — Z17.0 MALIGNANT NEOPLASM OF UPPER-OUTER QUADRANT OF LEFT BREAST IN FEMALE, ESTROGEN RECEPTOR POSITIVE (HCC): Primary | ICD-10-CM

## 2022-01-27 PROCEDURE — 99214 OFFICE O/P EST MOD 30 MIN: CPT | Performed by: NURSE PRACTITIONER

## 2022-01-27 NOTE — PROGRESS NOTES
Surgical Oncology Follow Up       [address] [facility],[address] [facility]  [facility] [place]  [address]  [facility] [address]    Roopa Reyna  1955  [id number]  [address] [facility],[address] [place] [address]  [facility] [place]  [address] [facility] [address]    Chief Complaint   Patient presents with    Follow-up       Assessment/Plan:  1  Malignant neoplasm of upper-outer quadrant of left breast in female, estrogen receptor positive ([facility] [address] )  - 6 mo f/u visit    2  Use of anastrozole (Arimidex)  - Continue use per medical oncology    Discussion/Summary: Patient is a 20-year-old female who presents today for a six-month follow-up visit for left breast cancer diagnosed in December of 2019  Her pathology revealed invasive mammary carcinoma, %, MO 90%, her 2-   She underwent genetic testing which was negative   She underwent a left lumpectomy and sentinel node biopsy by Dr Agustin Spain completed whole breast radiation and is currently taking anastrozole   She had a bilateral mammogram on December 15, 2021 which was BI-RADS 2, category 2 density  She offers no new complaints today and there are no concerns on today's exam   We will plan to see her back in 6 months or sooner should the need arise  She was instructed to call with any new concerns or symptoms prior to that time  All of her questions were answered today        History of Present Illness:     Oncology History Overview Note         Malignant neoplasm of upper-outer quadrant of left breast in female, estrogen receptor positive ([facility] [address] )   12/23/2019 Biopsy    Left breast ultrasound-guided biopsy  2 o'clock, 10 cm from nipple  Invasive mammary carcinoma of no special type  Grade 1    MO 90  HER2 1+    Concordant  Appears unifocal - difficulty assessing accurate size on mammo vs US  2 1 cm vs 1 1 cm; possible concern for adjacent mass on original US not able to be visualized on biopsy   Left axilla US negative  Right breast clear  1/17/2020 Genetic Testing    The following genes were evaluated: ALEKSANDAR, BARD1, BRCA1, BRCA2, BRIP1, CDH1, CHEK2, NBN, NF1, PALB2, PTEN, RAD50, STK11, TP53  Negative result  No pathogenic sequence variants or deletions/dupllications identified  [facility]     2/28/2020 Surgery    Left breast needle localized lumpectomy with sentinel lymph node biopsy  Invasive carcinoma of no special type  Grade 1  1 5 cm  Margins negative  0/1 Lymph node  Anatomic/Prognostic Stage IA     4/22/2020 -  Hormone Therapy    Anastrozole 1 mg daily  Dr Cassandria Lombard     5/11/2020 - 6/8/2020 Radiation    entire left breast to 4005cGy in 15 daily 267cGy fractions followed by an additional  1000cGy in 5 daily fractions to the lumpectomy cavity  Total dose to the lumpectomy cavity was 5005cGy  Total number of fractions: 20       Dr Ivet Gudino          -Interval History:  Patient presents today for follow-up visit for left breast cancer  She notices no changes on self-breast exam   She denies persistent headaches, back pain or bone pain, cough or shortness of breath, abdominal pain  She had a bilateral mammogram which was BI-RADS 2  She continues on anastrozole  Review of Systems:  Review of Systems   Constitutional: Negative for activity change, appetite change, chills, fatigue, fever and unexpected weight change  Respiratory: Negative for cough and shortness of breath  Cardiovascular: Negative for chest pain  Gastrointestinal: Negative for abdominal pain, constipation, diarrhea, nausea and vomiting  Musculoskeletal: Negative for arthralgias, back pain, gait problem and myalgias  Skin: Negative for color change and rash  Neurological: Negative for dizziness and headaches  Hematological: Negative for adenopathy  Psychiatric/Behavioral: Negative for agitation and confusion  All other systems reviewed and are negative        Patient Active Problem List   Diagnosis    DVT of upper extremity (deep vein thrombosis) ([facility] [address] [facility]    Type I diabetes mellitus, uncontrolled ([facility] [address] )    Encounter for gynecological examination without abnormal finding    Malignant neoplasm of upper-outer quadrant of left breast in female, estrogen receptor positive ([facility] [address] )    Use of anastrozole (Arimidex)     Past Medical History:   Diagnosis Date    BRCA gene mutation negative 01/2020    [facility]    Breast cancer ([facility] [address] ) 12/2019    left    Diabetes mellitus ([facility] [address] )     History of radiation therapy 01/2020    left breast ca    History of transfusion     2010    Hypertension     Pancreatitis     pancreatic cyst that burst    Pulmonary embolism on left [facility])     and right side     Past Surgical History:   Procedure Laterality Date    BREAST CYST EXCISION Left 2009    benign    BREAST LUMPECTOMY Left 2/28/2020    Procedure: BREAST NEEDLE LOCALIZED LUMPECTOMY (NEEDLE LOC AT 0900); Surgeon: Karley Cheema MD;  [facility];  Service: Surgical Oncology    COLONOSCOPY      FISTULA REPAIR      small intestine    ILEOSTOMY      with reversal    LYMPH NODE BIOPSY Left 2/28/2020    Procedure: SENTINEL LYMPH NODE BIOPSY; LYMPHATIC MAPPING WITH BLUE DYE AND RADIOACTIVE DYE (INJECT AT 1100 BY DR BENOIT IN THE OR);   Surgeon: Karley Cheema MD;  [facility];  Service: Surgical Oncology    MAMMO NEEDLE LOCALIZATION LEFT (ALL INC) Left 2/28/2020    TRACHEOSTOMY  2010    with repair    US GUIDED BREAST BIOPSY LEFT COMPLETE Left 12/23/2019     Family History   Problem Relation Age of Onset    Breast cancer Mother 43    Cervical cancer Mother 58    Breast cancer Sister 61    No Known Problems Maternal Grandmother     No Known Problems Paternal Grandmother     No Known Problems Maternal Aunt     Bone cancer Paternal Aunt 43    No Known Problems Paternal Aunt     Cancer Father         Bladder    Breast cancer Other      Social History     Socioeconomic History    Marital status: /Civil Union     Spouse name: Not on file    Number of children: Not on file    Years of education: Not on file    Highest education level: Not on file   Occupational History    Not on file   Tobacco Use    Smoking status: Never Smoker    Smokeless tobacco: Never Used   Vaping Use    Vaping Use: Never used   Substance and Sexual Activity    Alcohol use: No    Drug use: No    Sexual activity: Not Currently     Birth control/protection: Post-menopausal   Other Topics Concern    Not on file   Social History Narrative    Not on file     Social Determinants of Health     Financial Resource Strain: Not on file   Food Insecurity: Not on file   Transportation Needs: Not on file   Physical Activity: Not on file   Stress: Not on file   Social Connections: Not on file   Intimate Partner Violence: Not on file   Housing Stability: Not on file       Current Outpatient Medications:     anastrozole (ARIMIDEX) 1 mg tablet, take 1 tablet by mouth once daily, Disp: 90 tablet, Rfl: 1    Aspirin 81 MG EC tablet, Take by mouth, Disp: , Rfl:     Calcium-Vitamin D-Vitamin K 650-12 5-40 MG-MCG-MCG CHEW, , Disp: , Rfl:     Cholecalciferol (VITAMIN D3 PO), Take by mouth, Disp: , Rfl:     Cyanocobalamin (VITAMIN B 12) 100 MCG LOZG, Take by mouth, Disp: , Rfl:     fexofenadine (ALLEGRA) 60 MG tablet, Take by mouth, Disp: , Rfl:     FREESTYLE LITE test strip, use 1 TEST STRIP to TEST BLOOD SUGAR four times a day, Disp: , Rfl:     Insulin Disposable Pump (OmniPod 5 Pack) MISC, CHANGE POD EVERY 3 DAYS, Disp: , Rfl:     Insulin Infusion Pump KIT, by Does not apply route , Disp: , Rfl:     LOSARTAN POTASSIUM PO, Take 50 mg by mouth, Disp: , Rfl:     NOVOLOG 100 UNIT/ML injection, , Disp: , Rfl:     Omega-3 Fatty Acids (FISH OIL) 1,000 mg, Take 1,000 mg by mouth, Disp: , Rfl:     Polyethylene Glycol 3350 (MIRALAX PO), , Disp: , Rfl:     Specialty Vitamins Products (MARTI-RX DIABETIC VITAMIN PO), Take by mouth daily, Disp: , Rfl:     hydrocortisone 0 5 % cream, Apply topically 2 (two) times a day (Patient not taking: Reported on 1/27/2022 ), Disp: , Rfl:     losartan (COZAAR) 50 mg tablet, Take 50 mg by mouth daily, Disp: , Rfl:   Allergies   Allergen Reactions    Penicillins Hives    Ampicillin Rash    Sulfa Antibiotics Fatigue     Vitals:    01/27/22 0921   BP: 132/76   Pulse: 70   Resp: 18   SpO2: 100%       Physical Exam  Vitals reviewed  Constitutional:       General: She is not in acute distress  Appearance: Normal appearance  She is well-developed  She is not diaphoretic  HENT:      Head: Normocephalic and atraumatic  Cardiovascular:      Rate and Rhythm: Normal rate and regular rhythm  Heart sounds: Normal heart sounds  Pulmonary:      Effort: Pulmonary effort is normal       Breath sounds: Normal breath sounds  Chest:   Breasts:      Right: No swelling, bleeding, inverted nipple, mass, nipple discharge, skin change, tenderness, axillary adenopathy or supraclavicular adenopathy  Left: Skin change (surgical scars) present  No swelling, bleeding, inverted nipple, mass, nipple discharge, tenderness, axillary adenopathy or supraclavicular adenopathy  Abdominal:      Palpations: Abdomen is soft  There is no mass  Tenderness: There is no abdominal tenderness  Comments: fistula   Musculoskeletal:         General: Normal range of motion  Cervical back: Normal range of motion  Lymphadenopathy:      Upper Body:      Right upper body: No supraclavicular or axillary adenopathy  Left upper body: No supraclavicular or axillary adenopathy  Skin:     General: Skin is warm and dry  Findings: No rash  Neurological:      Mental Status: She is alert and oriented to person, place, and time  Psychiatric:         Speech: Speech normal            Advance Care Planning/Advance Directives:  Discussed disease status, cancer treatment plans and/or cancer treatment goals with the patient

## 2022-04-03 DIAGNOSIS — C50.412 MALIGNANT NEOPLASM OF UPPER-OUTER QUADRANT OF LEFT BREAST IN FEMALE, ESTROGEN RECEPTOR POSITIVE (HCC): ICD-10-CM

## 2022-04-03 DIAGNOSIS — Z17.0 MALIGNANT NEOPLASM OF UPPER-OUTER QUADRANT OF LEFT BREAST IN FEMALE, ESTROGEN RECEPTOR POSITIVE (HCC): ICD-10-CM

## 2022-04-04 RX ORDER — ANASTROZOLE 1 MG/1
TABLET ORAL
Qty: 90 TABLET | Refills: 1 | Status: SHIPPED | OUTPATIENT
Start: 2022-04-04

## 2022-05-13 NOTE — PRE-PROCEDURE INSTRUCTIONS
Pre-Surgery Instructions:   Medication Instructions    Aspirin 81 MG EC tablet Patient was instructed by Physician and understands   Cholecalciferol (VITAMIN D3 PO) Instructed patient per Anesthesia Guidelines   Cyanocobalamin (VITAMIN B 12) 100 MCG LOZG Instructed patient per Anesthesia Guidelines   docusate sodium (COLACE) 100 mg capsule Instructed patient per Anesthesia Guidelines   fexofenadine (ALLEGRA) 60 MG tablet Instructed patient per Anesthesia Guidelines   hydrocortisone 0 5 % cream Instructed patient per Anesthesia Guidelines   Insulin Infusion Pump KIT Patient was instructed by Physician and understands   LOSARTAN POTASSIUM PO Instructed patient per Anesthesia Guidelines   NOVOLOG 100 UNIT/ML injection Patient was instructed by Physician and understands   Omega-3 Fatty Acids (FISH OIL) 1,000 mg Instructed patient per Anesthesia Guidelines   Specialty Vitamins Products (MARTI-RX DIABETIC VITAMIN PO) Instructed patient per Anesthesia Guidelines      Pre op,medications and showering instructions reviewed-Patient has hibiclens
77

## 2022-05-27 ENCOUNTER — RADIATION ONCOLOGY FOLLOW-UP (OUTPATIENT)
Dept: RADIATION ONCOLOGY | Facility: CLINIC | Age: 67
End: 2022-05-27
Attending: RADIOLOGY
Payer: MEDICARE

## 2022-05-27 VITALS
SYSTOLIC BLOOD PRESSURE: 144 MMHG | BODY MASS INDEX: 28.29 KG/M2 | DIASTOLIC BLOOD PRESSURE: 78 MMHG | TEMPERATURE: 96.8 F | HEART RATE: 79 BPM | OXYGEN SATURATION: 100 % | WEIGHT: 170 LBS | RESPIRATION RATE: 16 BRPM

## 2022-05-27 DIAGNOSIS — Z17.0 MALIGNANT NEOPLASM OF UPPER-OUTER QUADRANT OF LEFT BREAST IN FEMALE, ESTROGEN RECEPTOR POSITIVE (HCC): Primary | ICD-10-CM

## 2022-05-27 DIAGNOSIS — C50.412 MALIGNANT NEOPLASM OF UPPER-OUTER QUADRANT OF LEFT BREAST IN FEMALE, ESTROGEN RECEPTOR POSITIVE (HCC): Primary | ICD-10-CM

## 2022-05-27 PROCEDURE — 99213 OFFICE O/P EST LOW 20 MIN: CPT | Performed by: RADIOLOGY

## 2022-05-27 PROCEDURE — 99211 OFF/OP EST MAY X REQ PHY/QHP: CPT | Performed by: RADIOLOGY

## 2022-05-27 NOTE — PROGRESS NOTES
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, was diagnosed with stage IA, grade 1 invasive ductal carcinoma of the left breast  She is status post lumpectomy and sentinel lymph node biopsy achieving negative margins on 20  Tumor cells were ER/CT positive and HER2 negative  She completed a course of adjuvant radiation to the left breast on 20  She is maintained on anastrazole  [facility]     11/3/21 Dr Luis Jarrett  currently on adjuvant hormonal therapy with anastrozole with minimal toxicity  no evidence recurrent disease, based on her symptoms and physical examinations  continue anastrozole 1 mg once a day  Follow up in 1 year    12/15/21 B/L diagnostic mammogram  LEFT  D) POST-SURGICAL FINDING: There are post-surgical findings from a previous lumpectomy seen in the left breast       BILATERAL  There are no suspicious masses, grouped microcalcifications or areas of unexplained architectural distortion  The skin and nipple areolar complex are unremarkable  Benign-appearing calcifications are noted in each breast   RECOMMENDATION:       - Diagnostic mammogram in 1 year for both breasts  22 Surgical Oncology  there are no concerns on today's exam   Follow up 6 months     Upcomin22 Surgical Oncology  22 Medical Oncology      Follow up visit     Oncology History   Malignant neoplasm of upper-outer quadrant of left breast in female, estrogen receptor positive ([facility] [address] )   2019 Biopsy    Left breast ultrasound-guided biopsy  2 o'clock, 10 cm from nipple  Invasive mammary carcinoma of no special type  Grade 1    CT 90  HER2 1+    Concordant  Appears unifocal - difficulty assessing accurate size on mammo vs US  2 1 cm vs 1 1 cm; possible concern for adjacent mass on original US not able to be visualized on biopsy   Left axilla US negative  Right breast clear  1/17/2020 Genetic Testing    The following genes were evaluated: ALEKSANDAR, BARD1, BRCA1, BRCA2, BRIP1, CDH1, CHEK2, NBN, NF1, PALB2, PTEN, RAD50, STK11, TP53  Negative result  No pathogenic sequence variants or deletions/dupllications identified  [employer]     2/28/2020 Surgery    Left breast needle localized lumpectomy with sentinel lymph node biopsy  Invasive carcinoma of no special type  Grade 1  1 5 cm  Margins negative  0/1 Lymph node  Anatomic/Prognostic Stage IA     4/22/2020 -  Hormone Therapy    Anastrozole 1 mg daily  Dr Kehinde Velasco     5/11/2020 - 6/8/2020 Radiation    entire left breast to 4005cGy in 15 daily 267cGy fractions followed by an additional  1000cGy in 5 daily fractions to the lumpectomy cavity  Total dose to the lumpectomy cavity was 5005cGy  Total number of fractions: 21       Dr Oseas Meneses         Review of Systems:  Review of Systems   Constitutional: Negative  Hot flashes at night   HENT: Negative  Eyes: Negative  Wears glasses  Recent cataract surgery- has "glare"   Respiratory: Negative  Cardiovascular: Negative  Gastrointestinal: Positive for constipation (occasional)  Endocrine: Negative  Genitourinary: Negative  Musculoskeletal: Positive for arthralgias, back pain and neck pain  Skin:        Fistula left abdomen   Allergic/Immunologic: Positive for environmental allergies  Neurological: Negative  Hematological: Negative  Psychiatric/Behavioral: Positive for sleep disturbance (occasional)         Clinical Trial: no        Covid Vaccine Status yes + booster    Health Maintenance   Topic Date Due    Hepatitis C Screening  Never done    Medicare Annual Wellness Visit (AWV)  Never done    Diabetic Foot Exam  Never done    DM Eye Exam  Never done    BMI: Followup Plan  Never done    Fall Risk  Never done    Colorectal Cancer Screening  02/19/2021    COVID-19 Vaccine (4 - Booster for Ritchie Peter series) 03/17/2022    Depression Screening  07/09/2022    HEMOGLOBIN A1C  08/28/2022    Breast Cancer Screening: Mammogram  12/15/2022    BMI: Adult  01/27/2023    DTaP,Tdap,and Td Vaccines (2 - Td or Tdap) 07/27/2024    Osteoporosis Screening  Completed    Pneumococcal Vaccine: 65+ Years  Completed    Influenza Vaccine  Completed    HIB Vaccine  Aged Out    Hepatitis B Vaccine  Aged Out    IPV Vaccine  Aged Out    Hepatitis A Vaccine  Aged Out    Meningococcal ACWY Vaccine  Aged Out    HPV Vaccine  Aged Out     Patient Active Problem List   Diagnosis    DVT of upper extremity (deep vein thrombosis) ([facility] [address] [facility]    Type I diabetes mellitus, uncontrolled    Encounter for gynecological examination without abnormal finding    Malignant neoplasm of upper-outer quadrant of left breast in female, estrogen receptor positive ([facility] [address] )    Use of anastrozole (Arimidex)     Past Medical History:   Diagnosis Date    BRCA gene mutation negative 01/2020    [employer]    Breast cancer ([facility] [address] ) 12/2019    left    Diabetes mellitus ([facility] [address] )     History of radiation therapy 01/2020    left breast ca    History of transfusion     2010    Hypertension     Pancreatitis     pancreatic cyst that burst    Pulmonary embolism on left [facility])     and right side     Past Surgical History:   Procedure Laterality Date    BREAST CYST EXCISION Left 2009    benign    BREAST LUMPECTOMY Left 2/28/2020    Procedure: BREAST NEEDLE LOCALIZED LUMPECTOMY (NEEDLE LOC AT 0900); Surgeon: Josie Hunter MD;  [facility];  Service: Surgical Oncology    COLONOSCOPY      FISTULA REPAIR      small intestine    ILEOSTOMY      with reversal    LYMPH NODE BIOPSY Left 2/28/2020    Procedure: SENTINEL LYMPH NODE BIOPSY; LYMPHATIC MAPPING WITH BLUE DYE AND RADIOACTIVE DYE (INJECT AT 1100 BY DR BENOIT IN THE OR);   Surgeon: Josie Hunter MD;  [facility];  Service: Surgical Oncology    MAMMO NEEDLE LOCALIZATION LEFT (ALL INC) Left 2/28/2020    TRACHEOSTOMY  2010    with repair    US GUIDED BREAST BIOPSY LEFT COMPLETE Left 12/23/2019     Family History   Problem Relation Age of Onset    Breast cancer Mother 43    Cervical cancer Mother 58    Breast cancer Sister 61    No Known Problems Maternal Grandmother     No Known Problems Paternal Grandmother     No Known Problems Maternal Aunt     Bone cancer Paternal Aunt 43    No Known Problems Paternal Aunt    [place] Cancer Father         Bladder    Breast cancer Other      Social History     Socioeconomic History    Marital status: /Civil Union     Spouse name: Not on file    Number of children: Not on file    Years of education: Not on file    Highest education level: Not on file   Occupational History    Not on file   Tobacco Use    Smoking status: Never Smoker    Smokeless tobacco: Never Used   Vaping Use    Vaping Use: Never used   Substance and Sexual Activity    Alcohol use: No    Drug use: No    Sexual activity: Not Currently     Birth control/protection: Post-menopausal   Other Topics Concern    Not on file   Social History Narrative    Not on file     Social Determinants of Health     Financial Resource Strain: Not on file   Food Insecurity: Not on file   Transportation Needs: Not on file   Physical Activity: Not on file   Stress: Not on file   Social Connections: Not on file   Intimate Partner Violence: Not on file   Housing Stability: Not on file       Current Outpatient Medications:     anastrozole (ARIMIDEX) 1 mg tablet, take 1 tablet by mouth once daily, Disp: 90 tablet, Rfl: 1    Aspirin 81 MG EC tablet, Take by mouth, Disp: , Rfl:     Calcium-Vitamin D-Vitamin K 650-12 5-40 MG-MCG-MCG CHEW, , Disp: , Rfl:     Cholecalciferol (VITAMIN D3 PO), Take by mouth, Disp: , Rfl:     Cyanocobalamin (VITAMIN B 12) 100 MCG LOZG, Take by mouth, Disp: , Rfl:     fexofenadine (ALLEGRA) 60 MG tablet, Take by mouth, Disp: , Rfl:     FREESTYLE LITE test strip, use 1 TEST STRIP to TEST BLOOD SUGAR four times a day, Disp: , Rfl:     hydrocortisone 0 5 % cream, Apply topically 2 (two) times a day (Patient not taking: Reported on 1/27/2022 ), Disp: , Rfl:     Insulin Disposable Pump (OmniPod 5 Pack) MISC, CHANGE POD EVERY 3 DAYS, Disp: , Rfl:     Insulin Infusion Pump KIT, by Does not apply route , Disp: , Rfl:     losartan (COZAAR) 50 mg tablet, Take 50 mg by mouth daily, Disp: , Rfl:     LOSARTAN POTASSIUM PO, Take 50 mg by mouth, Disp: , Rfl:     NOVOLOG 100 UNIT/ML injection, , Disp: , Rfl:     Omega-3 Fatty Acids (FISH OIL) 1,000 mg, Take 1,000 mg by mouth, Disp: , Rfl:     Polyethylene Glycol 3350 (MIRALAX PO), , Disp: , Rfl:     Specialty Vitamins Products (MARTI-RX DIABETIC VITAMIN PO), Take by mouth daily, Disp: , Rfl:   Allergies   Allergen Reactions    Penicillins Hives    Ampicillin Rash    Sulfa Antibiotics Fatigue     There were no vitals filed for this visit

## 2022-05-27 NOTE — PROGRESS NOTES
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, was diagnosed with stage IA, grade 1 invasive ductal carcinoma of the left breast  She is status post lumpectomy and sentinel lymph node biopsy achieving negative margins on 20  Tumor cells were ER/OR positive and HER2 negative   She completed a course of adjuvant radiation to the left breast on 20   She is maintained on anastrazole  She returns for follow-up evaluation today      The patient offers no breast related complaints  She denies new palpable nodules, suspicious skin changes, or nipple discharge of the breasts bilaterally   No significant or persistent pain or tenderness  She is maintained on anastrozole  She denies vaginal discharge or bleeding  She has hot flashes and mild increase in her baseline arthritis, but no other significant toxicity related to anastrozole  11/3/21 Dr Gina Smalls saw the patient in follow-up  Continued endocrine therapy with anastrozole was recommended      12/15/21 B/L diagnostic mammogram was benign (BIRADS-2)  Follow-up imaging was recommended in 1 year      22 Surgical Oncology follow-up was unremarkable      Upcomin22 Surgical Oncology  22 Medical Oncology    Historical Information   Oncology History   Malignant neoplasm of upper-outer quadrant of left breast in female, estrogen receptor positive ([facility] [address] )   2019 Biopsy    Left breast ultrasound-guided biopsy  2 o'clock, 10 cm from nipple  Invasive mammary carcinoma of no special type  Grade 1    PA 90  HER2 1+    Concordant  Appears unifocal - difficulty assessing accurate size on mammo vs US  2 1 cm vs 1 1 cm; possible concern for adjacent mass on original US not able to be visualized on biopsy  Left axilla US negative  Right breast clear  1/17/2020 Genetic Testing    The following genes were evaluated: ALEKSANDAR, BARD1, BRCA1, BRCA2, BRIP1, CDH1, CHEK2, NBN, NF1, PALB2, PTEN, RAD50, STK11, TP53  Negative result  No pathogenic sequence variants or deletions/dupllications identified  [employer]     2/28/2020 Surgery    Left breast needle localized lumpectomy with sentinel lymph node biopsy  Invasive carcinoma of no special type  Grade 1  1 5 cm  Margins negative  0/1 Lymph node  Anatomic/Prognostic Stage IA     4/22/2020 -  Hormone Therapy    Anastrozole 1 mg daily  Dr Cecily Lai     5/11/2020 - 6/8/2020 Radiation    entire left breast to 4005cGy in 15 daily 267cGy fractions followed by an additional  1000cGy in 5 daily fractions to the lumpectomy cavity  Total dose to the lumpectomy cavity was 5005cGy  Total number of fractions: 21       Dr Reyes Conger         Past Medical History:   Diagnosis Date    BRCA gene mutation negative 01/2020    [employer]    Breast cancer ([facility] [address] ) 12/2019    left    Diabetes mellitus ([facility] [address] )     History of radiation therapy 01/2020    left breast ca    History of transfusion     2010    Hypertension     Pancreatitis     pancreatic cyst that burst    Pulmonary embolism on left [facility])     and right side     Past Surgical History:   Procedure Laterality Date    BREAST CYST EXCISION Left 2009    benign    BREAST LUMPECTOMY Left 02/28/2020    Procedure: BREAST NEEDLE LOCALIZED LUMPECTOMY (NEEDLE LOC AT 0900);   Surgeon: Jeanne Bustillo MD;  [facility];  Service: Surgical Oncology    COLONOSCOPY      EYE SURGERY Bilateral     Cataract    FISTULA REPAIR      small intestine    ILEOSTOMY      with reversal    LYMPH NODE BIOPSY Left 02/28/2020    Procedure: SENTINEL LYMPH NODE BIOPSY; LYMPHATIC MAPPING WITH BLUE DYE AND RADIOACTIVE DYE (INJECT AT 1100 BY DR BENOIT IN THE OR);   Surgeon: Jose L Cervantes MD;  [facility];  Service: Surgical Oncology    MAMMO NEEDLE LOCALIZATION LEFT (ALL INC) Left 02/28/2020    TRACHEOSTOMY  2010    with repair    US GUIDED BREAST BIOPSY LEFT COMPLETE Left 12/23/2019       Social History   Social History     Substance and Sexual Activity   Alcohol Use No     Social History     Substance and Sexual Activity   Drug Use No     Social History     Tobacco Use   Smoking Status Never Smoker   Smokeless Tobacco Never Used         Meds/Allergies     Current Outpatient Medications:     anastrozole (ARIMIDEX) 1 mg tablet, take 1 tablet by mouth once daily, Disp: 90 tablet, Rfl: 1    Aspirin 81 MG EC tablet, Take by mouth, Disp: , Rfl:     Calcium-Vitamin D-Vitamin K 650-12 5-40 MG-MCG-MCG CHEW, , Disp: , Rfl:     Cholecalciferol (VITAMIN D3 PO), Take by mouth, Disp: , Rfl:     Cyanocobalamin (VITAMIN B 12) 100 MCG LOZG, Take by mouth, Disp: , Rfl:     fexofenadine (ALLEGRA) 60 MG tablet, Take by mouth, Disp: , Rfl:     FREESTYLE LITE test strip, use 1 TEST STRIP to TEST BLOOD SUGAR four times a day, Disp: , Rfl:     Insulin Disposable Pump (OmniPod 5 Pack) MISC, CHANGE POD EVERY 3 DAYS, Disp: , Rfl:     Insulin Infusion Pump KIT, by Does not apply route , Disp: , Rfl:     losartan (COZAAR) 50 mg tablet, Take 50 mg by mouth daily, Disp: , Rfl:     LOSARTAN POTASSIUM PO, Take 50 mg by mouth, Disp: , Rfl:     NOVOLOG 100 UNIT/ML injection, , Disp: , Rfl:     Omega-3 Fatty Acids (FISH OIL) 1,000 mg, Take 1,000 mg by mouth, Disp: , Rfl:     Polyethylene Glycol 3350 (MIRALAX PO), , Disp: , Rfl:     Specialty Vitamins Products (MARTI-RX DIABETIC VITAMIN PO), Take by mouth daily, Disp: , Rfl:     hydrocortisone 0 5 % cream, Apply topically 2 (two) times a day (Patient not taking: No sig reported), Disp: , Rfl:   Allergies   Allergen Reactions    Penicillins Hives    Ampicillin Rash    Sulfa Antibiotics Fatigue         Review of Systems   Constitutional: Negative  Hot flashes at night   HENT: Negative  Eyes: Negative  Wears glasses  Recent cataract surgery- has "glare"   Respiratory: Negative  Cardiovascular: Negative  Gastrointestinal: Positive for constipation (occasional)  Endocrine: Negative  Genitourinary: Negative  Musculoskeletal: Positive for arthralgias, back pain and neck pain  Skin:        Fistula left abdomen   Allergic/Immunologic: Positive for environmental allergies  Neurological: Negative  Hematological: Negative  Psychiatric/Behavioral: Positive for sleep disturbance (occasional)  OBJECTIVE:   /78   Pulse 79   Temp (!) 96 8 °F (36 °C)   Resp 16   Wt 77 1 kg (170 lb)   LMP  (LMP Unknown)   SpO2 100%   BMI 28 29 kg/m²   Karnofsky: 100 - Fully active, able to carry on all pre-disease performed without restriction    Physical Exam  Vitals and nursing note reviewed  Constitutional:       General: She is not in acute distress  Appearance: She is well-developed  Eyes:      General: No scleral icterus  Cardiovascular:      Rate and Rhythm: Normal rate and regular rhythm  Pulmonary:      Breath sounds: No wheezing, rhonchi or rales  Chest:   Breasts:      Right: No axillary adenopathy or supraclavicular adenopathy  Left: No axillary adenopathy or supraclavicular adenopathy  Comments: Breast examination demonstrates the patient to be symmetric in contour  The left breast is smaller in size  There is mild diffuse fibrosis, more notable medial breast and medial to lumpectomy scar of left breast   There are no palpable masses or suspicious skin changes of the breasts bilaterally  Abdominal:      General: There is no distension  Palpations: Abdomen is soft  Tenderness:  There is no abdominal tenderness  Comments: Right upper quadrant abdominal wound, covered  Dressing is clean  Skin surrounding is clean and dry  No erythema  Musculoskeletal:      Right lower leg: No edema  Left lower leg: No edema  Comments: No upper extremity edema bilaterally  Full range of motion of upper extremities bilaterally  Lymphadenopathy:      Cervical: No cervical adenopathy  Upper Body:      Right upper body: No supraclavicular or axillary adenopathy  Left upper body: No supraclavicular or axillary adenopathy  Neurological:      Mental Status: She is alert and oriented to person, place, and time  Gait: Gait normal             RESULTS  Imaging Studies:   Mammo diagnostic bilateral w 3d & cad 12/15/21  Narrative & Impression   DIAGNOSIS: Malignant neoplasm of upper-outer quadrant of left breast in female, estrogen receptor positive ([facility] [address] )      TECHNIQUE:  Digital screening mammography was performed  Computer Aided Detection (CAD) analyzed all applicable images  COMPARISONS:  Multiple prior exams dating between 08/24/2012 and 12/14/2020      RELEVANT HISTORY:   Family Breast Cancer History: History of breast cancer in Mother, Sister, Other  Family Medical History: Family medical history includes breast cancer in 3 relatives (mother, other, sister)  Personal History: Hormone history includes hormone replacement therapy  Surgical history includes lumpectomy and breast excisional biopsy   Medical history includes breast cancer      RISK ASSESSMENT:   5 Year Tyrer-Cuzick: 5 5 %  10 Year Tyrer-Cuzick: 10 26 %  Lifetime Tyrer-Cuzick: 19 82 %     TISSUE DENSITY:   There are scattered areas of fibroglandular density         INDICATION: Zoya Sanchez is a 74 y o  female presenting for history of breast cancer       FINDINGS:   LEFT  D) POST-SURGICAL FINDING: There are post-surgical findings from a previous lumpectomy seen in the left breast       BILATERAL  There are no suspicious masses, grouped microcalcifications or areas of unexplained architectural distortion  The skin and nipple areolar complex are unremarkable  Benign-appearing calcifications are noted in each breast         IMPRESSION:   No evidence of malignancy            ASSESSMENT/BI-RADS CATEGORY:  Left: 2 - Benign  Right: 2 - Benign  Overall: 2 - Benign     RECOMMENDATION:       - Diagnostic mammogram in 1 year for both breasts  Assessment/Plan:  Eyad Waldron is a 69y o  year old female with past medical history significant for 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, was diagnosed with stage IA, grade 1 invasive ductal carcinoma of the left breast  She is status post resection followed by adjuvant radiation completed on 6/8/20  Tumor cells were ER/WA positive and HER2 negative  She is maintained on anastrazole, which she is tolerating well  She remains clinically FRANCO  Overall, she has recovered well from previous local treatments  She continues to follow with Medical and Surgical Oncology  In addition she is seeing GI team at [facility] for history of pancreatic cyst diversion/fistula  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  We discussed annual follow-up versus follow-up as necessary  She and I agreed that follow-up as necessary would be reasonable and help alleviate some of her medical follow-up burden without compromising her oncology care  We will be happy to see her again should the need arise  Willa Perdomo MD  0/89/7543,6:06 AM    Portions of the record may have been created with voice recognition software   Occasional wrong word or "sound a like" substitutions may have occurred due to the inherent limitations of voice recognition software   Read the chart carefully and recognize, using context, where substitutions have occurred

## 2022-07-27 ENCOUNTER — OFFICE VISIT (OUTPATIENT)
Dept: SURGICAL ONCOLOGY | Facility: CLINIC | Age: 67
End: 2022-07-27
Payer: MEDICARE

## 2022-07-27 VITALS
RESPIRATION RATE: 18 BRPM | WEIGHT: 169 LBS | OXYGEN SATURATION: 99 % | BODY MASS INDEX: 28.16 KG/M2 | SYSTOLIC BLOOD PRESSURE: 132 MMHG | HEIGHT: 65 IN | DIASTOLIC BLOOD PRESSURE: 84 MMHG | TEMPERATURE: 97.3 F | HEART RATE: 73 BPM

## 2022-07-27 DIAGNOSIS — Z17.0 MALIGNANT NEOPLASM OF UPPER-OUTER QUADRANT OF LEFT BREAST IN FEMALE, ESTROGEN RECEPTOR POSITIVE (HCC): Primary | ICD-10-CM

## 2022-07-27 DIAGNOSIS — C50.412 MALIGNANT NEOPLASM OF UPPER-OUTER QUADRANT OF LEFT BREAST IN FEMALE, ESTROGEN RECEPTOR POSITIVE (HCC): Primary | ICD-10-CM

## 2022-07-27 DIAGNOSIS — Z79.811 USE OF ANASTROZOLE (ARIMIDEX): ICD-10-CM

## 2022-07-27 PROCEDURE — 99214 OFFICE O/P EST MOD 30 MIN: CPT

## 2022-07-27 NOTE — PROGRESS NOTES
Surgical Oncology Follow Up       [address] [facility],[address]  [facility]  [address]  [facility] [id number]    [facility]  1955  [id number]  [address] [place] [address],[address] [facility]  [facility] SURGICAL ONCOLOGY [place] [address] [place] [id number]    Chief Complaint   Patient presents with    Follow-up       Assessment/Plan:  1  Malignant neoplasm of upper-outer quadrant of left breast in female, estrogen receptor positive ([facility] [address] )  - 6 month follow up  - Mammo diagnostic bilateral w 3d & cad; Future    2  Use of anastrozole (Arimidex)  - continue use per medical oncology      Discussion/Summary:  Patient is a 49-year-old female presenting today for a six-month follow-up for left breast cancer diagnosed in November 2019  Pathology revealed invasive mammary carcinoma ER/NV positive, HER2 negative  She underwent genetic testing which was negative she had a left breast needle localized lumpectomy with sentinel node biopsy with Dr Reyes Brand and completed whole breast radiation therapy  She is currently on anastrozole  She will be due for a diagnostic bilateral mammogram in December of this year  There are no concerns on her clinical breast exam  I will see the patient back in 6 months or sooner should the need arise  She was instructed to call with any questions or concerns prior to this time  All questions were answered today  History of Present Illness:     Oncology History   Malignant neoplasm of upper-outer quadrant of left breast in female, estrogen receptor positive ([facility] [address] )   12/23/2019 Biopsy    Left breast ultrasound-guided biopsy  2 o'clock, 10 cm from nipple  Invasive mammary carcinoma of no special type  Grade 1    NV 90  HER2 1+    Concordant  Appears unifocal - difficulty assessing accurate size on mammo vs US  2 1 cm vs 1 1 cm; possible concern for adjacent mass on original US not able to be visualized on biopsy   Left axilla US negative  Right breast clear  1/17/2020 Genetic Testing    The following genes were evaluated: ALEKSANDAR, BARD1, BRCA1, BRCA2, BRIP1, CDH1, CHEK2, NBN, NF1, PALB2, PTEN, RAD50, STK11, TP53  Negative result  No pathogenic sequence variants or deletions/dupllications identified  [facility]     2/28/2020 Surgery    Left breast needle localized lumpectomy with sentinel lymph node biopsy  Invasive carcinoma of no special type  Grade 1  1 5 cm  Margins negative  0/1 Lymph node  Anatomic/Prognostic Stage IA     4/22/2020 -  Hormone Therapy    Anastrozole 1 mg daily  Dr Rodrigo Carrington     5/11/2020 - 6/8/2020 Radiation    entire left breast to 4005cGy in 15 daily 267cGy fractions followed by an additional  1000cGy in 5 daily fractions to the lumpectomy cavity  Total dose to the lumpectomy cavity was 5005cGy  Total number of fractions: 20       Dr Emmie Lozano          -Interval History: Patient is a 69-year-old female presenting today for a six-month follow-up for left breast cancer diagnosed in November 2019  She is currently on anastrozole  Patient denies changes on her breast exam  She denies persistent headaches, bone pain, back pain, shortness of breath, or abdominal pain  Review of Systems:  Review of Systems   Constitutional: Negative for activity change, appetite change, fatigue and unexpected weight change  Respiratory: Negative for cough and shortness of breath  Cardiovascular: Negative for chest pain  Gastrointestinal: Negative for abdominal pain, diarrhea, nausea and vomiting  Endocrine: Positive for heat intolerance  Musculoskeletal: Negative for arthralgias, back pain and myalgias  Skin: Negative for rash  Neurological: Negative for weakness and headaches  Hematological: Negative for adenopathy         Patient Active Problem List   Diagnosis    DVT of upper extremity (deep vein thrombosis) (HCC)    Type I diabetes mellitus, uncontrolled    Encounter for gynecological examination without abnormal finding  Malignant neoplasm of upper-outer quadrant of left breast in female, estrogen receptor positive ([facility] [address] )    Use of anastrozole (Arimidex)     Past Medical History:   Diagnosis Date    BRCA gene mutation negative 01/2020    [facility]    Breast cancer ([facility] [address] ) 12/2019    left    Diabetes mellitus ([facility] [address] )     History of radiation therapy 01/2020    left breast ca    History of transfusion     2010    Hypertension     Pancreatitis     pancreatic cyst that burst    Pulmonary embolism on left [facility])     and right side     Past Surgical History:   Procedure Laterality Date    BREAST CYST EXCISION Left 2009    benign    BREAST LUMPECTOMY Left 02/28/2020    Procedure: BREAST NEEDLE LOCALIZED LUMPECTOMY (NEEDLE LOC AT 0900); Surgeon: Nay Matta MD;  [facility];  Service: Surgical Oncology    COLONOSCOPY      EYE SURGERY Bilateral     Cataract    FISTULA REPAIR      small intestine    ILEOSTOMY      with reversal    LYMPH NODE BIOPSY Left 02/28/2020    Procedure: SENTINEL LYMPH NODE BIOPSY; LYMPHATIC MAPPING WITH BLUE DYE AND RADIOACTIVE DYE (INJECT AT 1100 BY DR BENOIT IN THE OR);   Surgeon: Nay Matta MD;  [facility];  Service: Surgical Oncology    MAMMO NEEDLE LOCALIZATION LEFT (ALL INC) Left 02/28/2020    TRACHEOSTOMY  2010    with repair    US GUIDED BREAST BIOPSY LEFT COMPLETE Left 12/23/2019     Family History   Problem Relation Age of Onset    Breast cancer Mother 43    Cervical cancer Mother 58    Breast cancer Sister 61    No Known Problems Maternal Grandmother     No Known Problems Paternal Grandmother     No Known Problems Maternal Aunt     Bone cancer Paternal Aunt 43    No Known Problems Paternal Aunt    [facility] Cancer Father         Bladder    Breast cancer Other      Social History     Socioeconomic History    Marital status: /Civil Union     Spouse name: Not on file    Number of children: Not on file    Years of education: Not on file    Highest education level: Not on file   Occupational History    Not on file   Tobacco Use    Smoking status: Never Smoker    Smokeless tobacco: Never Used   Vaping Use    Vaping Use: Never used   Substance and Sexual Activity    Alcohol use: No    Drug use: No    Sexual activity: Not Currently     Birth control/protection: Post-menopausal   Other Topics Concern    Not on file   Social History Narrative    Not on file     Social Determinants of Health     Financial Resource Strain: Not on file   Food Insecurity: Not on file   Transportation Needs: Not on file   Physical Activity: Not on file   Stress: Not on file   Social Connections: Not on file   Intimate Partner Violence: Not on file   Housing Stability: Not on file       Current Outpatient Medications:     anastrozole (ARIMIDEX) 1 mg tablet, take 1 tablet by mouth once daily, Disp: 90 tablet, Rfl: 1    Aspirin 81 MG EC tablet, Take by mouth, Disp: , Rfl:     Calcium-Vitamin D-Vitamin K 650-12 5-40 MG-MCG-MCG CHEW, , Disp: , Rfl:     Cholecalciferol (VITAMIN D3 PO), Take by mouth, Disp: , Rfl:     Cyanocobalamin (VITAMIN B 12) 100 MCG LOZG, Take by mouth, Disp: , Rfl:     fexofenadine (ALLEGRA) 60 MG tablet, Take by mouth, Disp: , Rfl:     FREESTYLE LITE test strip, use 1 TEST STRIP to TEST BLOOD SUGAR four times a day, Disp: , Rfl:     Insulin Disposable Pump (OmniPod 5 Pack) MISC, CHANGE POD EVERY 3 DAYS, Disp: , Rfl:     Insulin Infusion Pump KIT, by Does not apply route , Disp: , Rfl:     LOSARTAN POTASSIUM PO, Take 50 mg by mouth, Disp: , Rfl:     NOVOLOG 100 UNIT/ML injection, , Disp: , Rfl:     Omega-3 Fatty Acids (FISH OIL) 1,000 mg, Take 1,000 mg by mouth, Disp: , Rfl:     Polyethylene Glycol 3350 (MIRALAX PO), , Disp: , Rfl:     Specialty Vitamins Products (MARTI-RX DIABETIC VITAMIN PO), Take by mouth daily, Disp: , Rfl:     hydrocortisone 0 5 % cream, Apply topically 2 (two) times a day (Patient not taking: No sig reported), Disp: , Rfl:     losartan (COZAAR) 50 mg tablet, Take 50 mg by mouth daily, Disp: , Rfl:   Allergies   Allergen Reactions    Penicillins Hives    Ampicillin Rash    Sulfa Antibiotics Fatigue     Vitals:    07/27/22 0900   Resp: 18       Physical Exam  Constitutional:       General: She is not in acute distress  Appearance: Normal appearance  Cardiovascular:      Rate and Rhythm: Normal rate and regular rhythm  Pulses: Normal pulses  Heart sounds: Normal heart sounds  Pulmonary:      Effort: Pulmonary effort is normal       Breath sounds: Normal breath sounds  Chest:      Chest wall: No mass  Breasts:      Right: No swelling, bleeding, inverted nipple, mass, nipple discharge, skin change, tenderness, axillary adenopathy or supraclavicular adenopathy  Left: No swelling, bleeding, inverted nipple, mass, nipple discharge, skin change, tenderness, axillary adenopathy or supraclavicular adenopathy  Comments: Left breast lumpectomy scar  No masses, nodularity, skin changes, nipple changes or discharge, or adenopathy appreciated on physical exam      Abdominal:      General: Abdomen is flat  Palpations: Abdomen is soft  Lymphadenopathy:      Upper Body:      Right upper body: No supraclavicular, axillary or pectoral adenopathy  Left upper body: No supraclavicular, axillary or pectoral adenopathy  Skin:     General: Skin is warm  Neurological:      General: No focal deficit present  Mental Status: She is alert and oriented to person, place, and time  Psychiatric:         Mood and Affect: Mood normal          Behavior: Behavior normal            Results:    Imaging  No results found  I reviewed the above imaging data  Advance Care Planning/Advance Directives:  Discussed disease status, cancer treatment plans and/or cancer treatment goals with the patient

## 2022-09-17 DIAGNOSIS — Z17.0 MALIGNANT NEOPLASM OF UPPER-OUTER QUADRANT OF LEFT BREAST IN FEMALE, ESTROGEN RECEPTOR POSITIVE (HCC): ICD-10-CM

## 2022-09-17 DIAGNOSIS — C50.412 MALIGNANT NEOPLASM OF UPPER-OUTER QUADRANT OF LEFT BREAST IN FEMALE, ESTROGEN RECEPTOR POSITIVE (HCC): ICD-10-CM

## 2022-09-19 RX ORDER — ANASTROZOLE 1 MG/1
TABLET ORAL
Qty: 90 TABLET | Refills: 1 | Status: SHIPPED | OUTPATIENT
Start: 2022-09-19

## 2022-09-26 ENCOUNTER — TELEPHONE (OUTPATIENT)
Dept: OBGYN CLINIC | Facility: CLINIC | Age: 67
End: 2022-09-26

## 2022-09-26 NOTE — TELEPHONE ENCOUNTER
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

## 2022-09-26 NOTE — TELEPHONE ENCOUNTER
----- Message from Bhanu Driscoll sent at 9/26/2022  7:40 AM EDT -----  Regarding: may need an appointment  I have pain in lower abdomen and in groin  There is a pink spot on skin in that area  When I stand there is pressure  In the past my uterus would tip backward  Please call me at [phone]

## 2022-09-30 ENCOUNTER — OFFICE VISIT (OUTPATIENT)
Dept: OBGYN CLINIC | Facility: MEDICAL CENTER | Age: 67
End: 2022-09-30
Payer: MEDICARE

## 2022-09-30 VITALS
HEIGHT: 65 IN | BODY MASS INDEX: 28.26 KG/M2 | DIASTOLIC BLOOD PRESSURE: 100 MMHG | WEIGHT: 169.6 LBS | SYSTOLIC BLOOD PRESSURE: 140 MMHG

## 2022-09-30 DIAGNOSIS — E11.59 HYPERTENSION ASSOCIATED WITH DIABETES (HCC): ICD-10-CM

## 2022-09-30 DIAGNOSIS — I15.2 HYPERTENSION ASSOCIATED WITH DIABETES (HCC): ICD-10-CM

## 2022-09-30 DIAGNOSIS — B96.89 BV (BACTERIAL VAGINOSIS): ICD-10-CM

## 2022-09-30 DIAGNOSIS — R10.30 LOWER ABDOMINAL PAIN: Primary | ICD-10-CM

## 2022-09-30 DIAGNOSIS — N76.0 BV (BACTERIAL VAGINOSIS): ICD-10-CM

## 2022-09-30 PROCEDURE — 99203 OFFICE O/P NEW LOW 30 MIN: CPT | Performed by: STUDENT IN AN ORGANIZED HEALTH CARE EDUCATION/TRAINING PROGRAM

## 2022-09-30 RX ORDER — METRONIDAZOLE 500 MG/1
500 TABLET ORAL EVERY 12 HOURS SCHEDULED
Qty: 14 TABLET | Refills: 0 | Status: SHIPPED | OUTPATIENT
Start: 2022-09-30 | End: 2022-10-07

## 2022-09-30 RX ORDER — SODIUM CHLORIDE 50 MG/ML
SOLUTION OPHTHALMIC
COMMUNITY
Start: 2022-07-25

## 2022-09-30 RX ORDER — PREDNISOLONE ACETATE 10 MG/ML
SUSPENSION/ DROPS OPHTHALMIC
COMMUNITY
Start: 2022-08-12

## 2022-09-30 NOTE — PROGRESS NOTES
Assessment/Plan:      Diagnoses and all orders for this visit:    Lower abdominal pain  Suspect pain/mass are related to prior surgeries  Scar tissue vs  Hernia?? Ordered CT but recommend she touch base with surgeon at [facility] to ensure no alternate/additional imaging recommended  -     CT chest abdomen pelvis w contrast; Future    BV (bacterial vaginosis)  -     metroNIDAZOLE (FLAGYL) 500 mg tablet; Take 1 tablet (500 mg total) by mouth every 12 (twelve) hours for 7 days    F/u for annual exam     Hypertension associated with diabetes ([facility] [address] )    Other orders  -     prednisoLONE acetate (PRED FORTE) 1 % ophthalmic suspension; instill 1 drop into right eye four times a day  -     Nikki 128 5 % hypertonic ophthalmic solution; INSTILL 1 DROP INTO BOTH EYES EVERY EVENING AS DIRECTED          Subjective:     Patient ID: Rosa Maria Simmons is a 69 y o  female  68 yo  is here for pelvic pain with an abdominal lump that was pink and warm to the touch  The pain radiates to her groin and down her legs  She also feels vulvar/vaginal swelling  Has pelvic pressure at times with vaginal dryness  She has a retroverted uterus and was told this can sometimes cause pelvic pain  Also has h/o pancreatic cyst rupture and extensive abdominal surgery  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

## 2022-10-12 ENCOUNTER — HOSPITAL ENCOUNTER (OUTPATIENT)
Dept: RADIOLOGY | Facility: MEDICAL CENTER | Age: 67
Discharge: HOME | End: 2022-10-12
Payer: MEDICARE

## 2022-10-12 DIAGNOSIS — R10.30 LOWER ABDOMINAL PAIN: ICD-10-CM

## 2022-10-12 PROCEDURE — 71260 CT THORAX DX C+: CPT

## 2022-10-12 PROCEDURE — 74177 CT ABD & PELVIS W/CONTRAST: CPT

## 2022-10-12 PROCEDURE — G1004 CDSM NDSC: HCPCS

## 2022-10-12 RX ADMIN — IOHEXOL 100 ML: 350 INJECTION, SOLUTION INTRAVENOUS at 11:32

## 2022-10-19 ENCOUNTER — HOSPITAL ENCOUNTER (EMERGENCY)
Facility: HOSPITAL | Age: 67
Discharge: HOME | End: 2022-10-19
Attending: EMERGENCY MEDICINE
Payer: MEDICARE

## 2022-10-19 ENCOUNTER — TELEPHONE (OUTPATIENT)
Dept: OBGYN CLINIC | Facility: CLINIC | Age: 67
End: 2022-10-19

## 2022-10-19 VITALS
SYSTOLIC BLOOD PRESSURE: 156 MMHG | DIASTOLIC BLOOD PRESSURE: 68 MMHG | HEART RATE: 77 BPM | WEIGHT: 169 LBS | TEMPERATURE: 98.1 F | OXYGEN SATURATION: 97 % | RESPIRATION RATE: 16 BRPM | BODY MASS INDEX: 28.56 KG/M2

## 2022-10-19 DIAGNOSIS — L02.91 ABSCESS: Primary | ICD-10-CM

## 2022-10-19 LAB
ALBUMIN SERPL BCP-MCNC: 3 G/DL (ref 3.5–5)
ALP SERPL-CCNC: 98 U/L (ref 46–116)
ALT SERPL W P-5'-P-CCNC: 23 U/L (ref 12–78)
ANION GAP SERPL CALCULATED.3IONS-SCNC: 8 MMOL/L (ref 4–13)
AST SERPL W P-5'-P-CCNC: 12 U/L (ref 5–45)
BASOPHILS # BLD AUTO: 0.02 THOUSANDS/ÂΜL (ref 0–0.1)
BASOPHILS NFR BLD AUTO: 1 % (ref 0–1)
BILIRUB SERPL-MCNC: 0.24 MG/DL (ref 0.2–1)
BUN SERPL-MCNC: 15 MG/DL (ref 5–25)
CALCIUM ALBUM COR SERPL-MCNC: 10.9 MG/DL (ref 8.3–10.1)
CALCIUM SERPL-MCNC: 10.1 MG/DL (ref 8.3–10.1)
CHLORIDE SERPL-SCNC: 106 MMOL/L (ref 96–108)
CO2 SERPL-SCNC: 30 MMOL/L (ref 21–32)
CREAT SERPL-MCNC: 0.69 MG/DL (ref 0.6–1.3)
EOSINOPHIL # BLD AUTO: 0.05 THOUSAND/ÂΜL (ref 0–0.61)
EOSINOPHIL NFR BLD AUTO: 1 % (ref 0–6)
ERYTHROCYTE [DISTWIDTH] IN BLOOD BY AUTOMATED COUNT: 13.3 % (ref 11.6–15.1)
GFR SERPL CREATININE-BSD FRML MDRD: 89 ML/MIN/1.73SQ M
GLUCOSE SERPL-MCNC: 185 MG/DL (ref 65–140)
HCT VFR BLD AUTO: 35.9 % (ref 34.8–46.1)
HGB BLD-MCNC: 11.2 G/DL (ref 11.5–15.4)
IMM GRANULOCYTES # BLD AUTO: 0.02 THOUSAND/UL (ref 0–0.2)
IMM GRANULOCYTES NFR BLD AUTO: 1 % (ref 0–2)
LACTATE SERPL-SCNC: 1.1 MMOL/L (ref 0.5–2)
LYMPHOCYTES # BLD AUTO: 1.08 THOUSANDS/ÂΜL (ref 0.6–4.47)
LYMPHOCYTES NFR BLD AUTO: 25 % (ref 14–44)
MCH RBC QN AUTO: 27.3 PG (ref 26.8–34.3)
MCHC RBC AUTO-ENTMCNC: 31.2 G/DL (ref 31.4–37.4)
MCV RBC AUTO: 88 FL (ref 82–98)
MONOCYTES # BLD AUTO: 0.43 THOUSAND/ÂΜL (ref 0.17–1.22)
MONOCYTES NFR BLD AUTO: 10 % (ref 4–12)
NEUTROPHILS # BLD AUTO: 2.74 THOUSANDS/ÂΜL (ref 1.85–7.62)
NEUTS SEG NFR BLD AUTO: 62 % (ref 43–75)
NRBC BLD AUTO-RTO: 0 /100 WBCS
PLATELET # BLD AUTO: 231 THOUSANDS/UL (ref 149–390)
PMV BLD AUTO: 9.2 FL (ref 8.9–12.7)
POTASSIUM SERPL-SCNC: 3.8 MMOL/L (ref 3.5–5.3)
PROT SERPL-MCNC: 7.4 G/DL (ref 6.4–8.4)
RBC # BLD AUTO: 4.1 MILLION/UL (ref 3.81–5.12)
SODIUM SERPL-SCNC: 144 MMOL/L (ref 135–147)
WBC # BLD AUTO: 4.34 THOUSAND/UL (ref 4.31–10.16)

## 2022-10-19 PROCEDURE — 80053 COMPREHEN METABOLIC PANEL: CPT | Performed by: EMERGENCY MEDICINE

## 2022-10-19 PROCEDURE — 99283 EMERGENCY DEPT VISIT LOW MDM: CPT

## 2022-10-19 PROCEDURE — 36415 COLL VENOUS BLD VENIPUNCTURE: CPT | Performed by: EMERGENCY MEDICINE

## 2022-10-19 PROCEDURE — 87186 SC STD MICRODIL/AGAR DIL: CPT | Performed by: EMERGENCY MEDICINE

## 2022-10-19 PROCEDURE — 87070 CULTURE OTHR SPECIMN AEROBIC: CPT | Performed by: EMERGENCY MEDICINE

## 2022-10-19 PROCEDURE — 99284 EMERGENCY DEPT VISIT MOD MDM: CPT | Performed by: EMERGENCY MEDICINE

## 2022-10-19 PROCEDURE — 87077 CULTURE AEROBIC IDENTIFY: CPT | Performed by: EMERGENCY MEDICINE

## 2022-10-19 PROCEDURE — 87205 SMEAR GRAM STAIN: CPT | Performed by: EMERGENCY MEDICINE

## 2022-10-19 PROCEDURE — 83605 ASSAY OF LACTIC ACID: CPT | Performed by: EMERGENCY MEDICINE

## 2022-10-19 PROCEDURE — 85025 COMPLETE CBC W/AUTO DIFF WBC: CPT | Performed by: EMERGENCY MEDICINE

## 2022-10-19 PROCEDURE — 96365 THER/PROPH/DIAG IV INF INIT: CPT

## 2022-10-19 RX ORDER — CLINDAMYCIN HYDROCHLORIDE 300 MG/1
300 CAPSULE ORAL 4 TIMES DAILY
Qty: 28 CAPSULE | Refills: 0 | Status: SHIPPED | OUTPATIENT
Start: 2022-10-19 | End: 2022-10-26

## 2022-10-19 RX ORDER — CLINDAMYCIN PHOSPHATE 900 MG/50ML
900 INJECTION INTRAVENOUS ONCE
Status: COMPLETED | OUTPATIENT
Start: 2022-10-19 | End: 2022-10-19

## 2022-10-19 RX ADMIN — CLINDAMYCIN PHOSPHATE 900 MG: 900 INJECTION, SOLUTION INTRAVENOUS at 11:34

## 2022-10-19 NOTE — TELEPHONE ENCOUNTER
----- Message from Obdulia Galindo MD sent at 10/18/2022  5:27 PM EDT -----  Pt stopped in the office this afternoon for an unscheduled visit  We discussed her CT scan  On exam, she does have erythema and tenderness in the area of the abscess  I recommend she go to the ED immediately  She plans to call her doctors at [facility] and go to the ED there  Clinical pool - can we keep track of this pt? Please call her in 2 days  We need to make sure she is seen at [facility]  We also need to make sure this gall bladder mass is address, though her abscess will take priority for now  Thank you

## 2022-10-19 NOTE — DISCHARGE INSTRUCTIONS
A  personal message from Dr Kelle Barnett,  Thank you so much for allowing me to care for you today  I pride myself in the care and attention I give all my patients  I hope you were a witness to this tonight  If for any reason your condition does not improve, worsens, or you have a question that was not answered during your visit you can feel free to text me on my personal phone   # [phone]  I will answer to your message and continue your care past your emergency room visit

## 2022-10-20 NOTE — TELEPHONE ENCOUNTER
Called pt to f/u    Per chart she went to the ER yesterday-   She sent Cs a [address] with an update  CS did reply but pt did not read it- I relayed msg via vm

## 2022-10-22 LAB
BACTERIA WND AEROBE CULT: ABNORMAL
GRAM STN SPEC: ABNORMAL
GRAM STN SPEC: ABNORMAL

## 2022-10-22 NOTE — RESULT ENCOUNTER NOTE
Patient discharged on clindamycin which provides appropriate coverage of abdominal wound per review of wound culture data

## 2022-12-20 ENCOUNTER — HOSPITAL ENCOUNTER (OUTPATIENT)
Dept: MAMMOGRAPHY | Facility: CLINIC | Age: 67
Discharge: HOME | End: 2022-12-20

## 2022-12-20 VITALS — BODY MASS INDEX: 27.82 KG/M2 | WEIGHT: 167 LBS | HEIGHT: 65 IN

## 2022-12-20 DIAGNOSIS — C50.412 MALIGNANT NEOPLASM OF UPPER-OUTER QUADRANT OF LEFT BREAST IN FEMALE, ESTROGEN RECEPTOR POSITIVE (HCC): ICD-10-CM

## 2022-12-20 DIAGNOSIS — Z17.0 MALIGNANT NEOPLASM OF UPPER-OUTER QUADRANT OF LEFT BREAST IN FEMALE, ESTROGEN RECEPTOR POSITIVE (HCC): ICD-10-CM

## 2022-12-20 NOTE — PROGRESS NOTES
Met with patient and Dr Candance Pennant regarding recommendation for;    _____ RIGHT ___X___LEFT      _____Ultrasound guided  ___X___Stereotactic breast biopsy  __X___Verbalized understanding        Blood thinners:  No: _____ Yes: ___X___ What: ASA 81mg                Biopsy teaching sheet given:  Yes: ___X___ No: ________    Pt given contact information and adv to call with any questions/needs

## 2023-01-04 ENCOUNTER — OFFICE VISIT (OUTPATIENT)
Dept: HEMATOLOGY ONCOLOGY | Facility: CLINIC | Age: 68
End: 2023-01-04

## 2023-01-04 VITALS
DIASTOLIC BLOOD PRESSURE: 98 MMHG | HEART RATE: 106 BPM | OXYGEN SATURATION: 98 % | WEIGHT: 168 LBS | RESPIRATION RATE: 18 BRPM | BODY MASS INDEX: 27.99 KG/M2 | HEIGHT: 65 IN | SYSTOLIC BLOOD PRESSURE: 138 MMHG

## 2023-01-04 DIAGNOSIS — C50.412 MALIGNANT NEOPLASM OF UPPER-OUTER QUADRANT OF LEFT BREAST IN FEMALE, ESTROGEN RECEPTOR POSITIVE (HCC): Primary | ICD-10-CM

## 2023-01-04 DIAGNOSIS — Z17.0 MALIGNANT NEOPLASM OF UPPER-OUTER QUADRANT OF LEFT BREAST IN FEMALE, ESTROGEN RECEPTOR POSITIVE (HCC): Primary | ICD-10-CM

## 2023-01-04 NOTE — PROGRESS NOTES
Hematology / Oncology Outpatient Follow Up Note    Zaynab Giordano 69 y o  female :1955 YFL:[id number]         Date:  2023    Assessment / Plan:  A 26-year-old postmenopausal woman with stage I A left breast cancer, grade 1,  % positive, WY 90% positive, HER2 negative disease   She is negative for BRCA gene mutation, although she has strong family history of breast cancer   She underwent lumpectomy and sentinel lymph node biopsy, resulting in FRANCO  She is currently on adjuvant hormonal therapy with anastrozole with minimal toxicity  Clinically, she has no evidence of recurrent disease  Further surgical intervention depends on the left breast biopsy which she is going to do later this week  In the meantime, I recommended her to continue anastrozole 1 mg once a day  Assuming that her biopsy is benign, I will see her again in a year for routine follow-up  She is in agreement with my recommendations        Subjective:      HPI: A 57-year-old postmenopausal woman who was found to have abnormality in her left breast, based on a screening mammography   In 2019, left breast biopsy showed invasive ductal carcinoma, grade 1  This was % positive, WY 90% positive, HER2 negative disease   She has strong family history of breast cancer in her mother as well as sister  Mishel Mass, she underwent genetic testing which was negative for BRCA gene mutation   She subsequently underwent lumpectomy and sentinel lymph node biopsy by Dr Wayne Dobson   Lumpectomy showed 1 5 cm of invasive ductal carcinoma, grade 1   There was no evidence of lymphovascular invasion   1 sentinel lymph node was negative for metastatic disease   This is the initial medical oncology consultation virtually to discuss adjuvant therapy   She feels well  Dev Session has no breast related symptomatology  Kristyn Rocha weight is stable   She has no respiratory symptoms   She has well-controlled diabetes and hypertension   She her mother was diagnosed with breast cancer in her early 45s   Her sister was diagnosed with breast cancer at age of 58  She is a lifetime never smoker  Luigi Maher is in usual state of health at this moment               Interval History:  A 58-year-old postmenopausal woman with stage I A left breast cancer, grade 1,  % positive, MS 90% positive, HER2 negative disease   She is negative for BRCA gene mutation, although she has strong family history of breast cancer   She underwent lumpectomy and sentinel lymph node biopsy, resulting in FRANCO  Since April 2020, she has been on adjuvant hormonal therapy with anastrozole  She came in today for routine follow-up  Recently, her left breast mammography was found to be abnormal   She is scheduled to have stereotactic left breast biopsy later this week  She has follow-up appointment with surgical oncology  She is tolerating anastrozole well with no side effects  She denied any pain  She has no respiratory symptoms  Her weight is stable    Her performance status is normal         Objective:      Primary Diagnosis:     1  Left breast cancer, stage IA (pT1c, pN0, M0) grade 1, % positive, MS 90% positive, HER2 negative disease   Diagnosed in February 2020       2  BRCA gene mutation negative      Cancer Staging: [email or web address]        Previous Hematologic/ Oncologic Treatment:            Current Hematologic/ Oncologic Treatment:       Adjuvant hormonal therapy with anastrozole since April 2020       Disease Status:      FRANCO status post lumpectomy and sentinel lymph node biopsy      Test Results:     Pathology:     1 5 cm of invasive ductal carcinoma, grade 1   No evidence of lymphovascular invasion   1 sentinel lymph node was negative for metastatic disease   % positive, MS 90% positive, HER2 negative disease   Stage IA (pT1c, pN0, M0)     Radiology:       Mammography in December 2022 was somewhat suspicious in her left breast   She is scheduled to have stereotactic left breast biopsy       DEXA scan in October 2018 showed T-score -0 9, consistent with normal bone density      Laboratory:     CBC and CMP are within normal limits         Physical Exam:        General Appearance:    Alert, oriented          Eyes:    PERRL   Ears:    Normal external ear canals, both ears   Nose:   Nares normal, septum midline   Throat:   Mucosa moist  Pharynx without injection  Neck:   Supple         Lungs:     Clear to auscultation bilaterally   Chest Wall:    No tenderness or deformity    Heart:    Regular rate and rhythm         Abdomen:     Soft, non-tender, bowel sounds +, no organomegaly               Extremities:   Extremities no cyanosis or edema         Skin:   no rash or icterus  Lymph nodes:   Cervical, supraclavicular, and axillary nodes normal   Neurologic:   CNII-XII intact, normal strength, sensation and reflexes     Throughout             Breast exam:   Lumpectomy scar at outer upper quadrant of her left breast with no palpable abnormalities  Right breast exam is negative  ROS: Review of Systems   All other systems reviewed and are negative  Imaging: No results found  Labs:   Lab Results   Component Value Date    WBC 4 34 10/19/2022    HGB 11 2 (L) 10/19/2022    HCT 35 9 10/19/2022    MCV 88 10/19/2022     10/19/2022     Lab Results   Component Value Date    K 3 8 10/19/2022     10/19/2022    CO2 30 10/19/2022    BUN 15 10/19/2022    CREATININE 0 69 10/19/2022    CALCIUM 10 1 10/19/2022    CORRECTEDCA 10 9 (H) 10/19/2022    AST 12 10/19/2022    ALT 23 10/19/2022    ALKPHOS 98 10/19/2022    EGFR 89 10/19/2022         Current Medications: Reviewed  Allergies: Reviewed  PMH/FH/SH:  Reviewed      Vital Sign:    Body surface area is 1 83 meters squared      Wt Readings from Last 3 Encounters:   01/04/23 76 2 kg (168 lb)   12/20/22 75 8 kg (167 lb)   10/19/22 76 7 kg (169 lb)        Temp Readings from Last 3 Encounters:   10/19/22 98 1 °F (36 7 °C) (Temporal)   07/27/22 (!) 97 3 °F (36 3 °C)   05/27/22 (!) 96 8 °F (36 °C)        BP Readings from Last 3 Encounters:   01/04/23 138/98   10/19/22 156/68   09/30/22 140/100         Pulse Readings from Last 3 Encounters:   01/04/23 (!) 106   10/19/22 77   07/27/22 73     @LASTSAO2(3)@

## 2023-01-06 ENCOUNTER — HOSPITAL ENCOUNTER (OUTPATIENT)
Dept: MAMMOGRAPHY | Facility: CLINIC | Age: 68
End: 2023-01-06

## 2023-01-06 ENCOUNTER — HOSPITAL ENCOUNTER (OUTPATIENT)
Dept: MAMMOGRAPHY | Facility: CLINIC | Age: 68
Discharge: HOME | End: 2023-01-06

## 2023-01-06 VITALS — SYSTOLIC BLOOD PRESSURE: 188 MMHG | DIASTOLIC BLOOD PRESSURE: 90 MMHG | HEART RATE: 89 BPM

## 2023-01-06 DIAGNOSIS — R92.8 ABNORMAL MAMMOGRAM: ICD-10-CM

## 2023-01-06 RX ORDER — LIDOCAINE HYDROCHLORIDE AND EPINEPHRINE 20; 5 MG/ML; UG/ML
10 INJECTION, SOLUTION EPIDURAL; INFILTRATION; INTRACAUDAL; PERINEURAL ONCE
Status: COMPLETED | OUTPATIENT
Start: 2023-01-06 | End: 2023-01-06

## 2023-01-06 RX ORDER — LIDOCAINE HYDROCHLORIDE 10 MG/ML
5 INJECTION, SOLUTION EPIDURAL; INFILTRATION; INTRACAUDAL; PERINEURAL ONCE
Status: COMPLETED | OUTPATIENT
Start: 2023-01-06 | End: 2023-01-06

## 2023-01-06 RX ADMIN — LIDOCAINE HYDROCHLORIDE 5 ML: 10 INJECTION, SOLUTION EPIDURAL; INFILTRATION; INTRACAUDAL; PERINEURAL at 09:37

## 2023-01-06 RX ADMIN — LIDOCAINE HYDROCHLORIDE AND EPINEPHRINE 10 ML: 20; 5 INJECTION, SOLUTION EPIDURAL; INFILTRATION; INTRACAUDAL; PERINEURAL at 09:37

## 2023-01-06 NOTE — PROGRESS NOTES
Procedure type:    _____ultrasound guided __x___stereotactic    Breast:    __x___Left _____Right    Location: 1 o'clock     Needle: 8 gauge mammotome     # of passes: 3 ( all with calcifications )    Clip: Mammotome riley    Performed by: Dr Tati Schneider held for 5 minutes by: Rolo Engel Strips:    __x___yes _____no    Band aid:    ___x__yes_____no    Tape and guaze:    _____yes __x___no    Tolerated procedure:    __x___yes _____no

## 2023-01-06 NOTE — PROGRESS NOTES
Ice pack given:    ___x__yes _____no    Discharge instructions signed by patient:    __x___yes _____no    Discharge instructions given to patient:    ___x__yes _____no    Discharged via:    __x___ambulatory    _____wheelchair    _____stretcher    Stable on discharge:    ___x__yes ____no  Patient would like results over the phone  Ok to leave a message

## 2023-01-06 NOTE — DISCHARGE INSTR - OTHER ORDERS
POST LARGE CORE BREAST BIOPSY PATIENT INFORMATION      Place an ice pack inside your bra over the top of the dressing every hour for 20 minutes (20 minutes on, 60 minutes off)  Do this until bedtime  Do not shower or bathe until the following morning  After bathing, you may remove the bandaid  You may bathe your breast carefully with the steri-strips in place  Be careful not to loosen them  The steri-strips will fall off in 3-5 days  You may have mild discomfort, and you may have some bruising where the needle entered the skin  This should clear within 5-7 days  If you need medicine for discomfort, take acetaminophen products such as Tylenol  You may also take Advil or Motrin products  DO NOT use Aspirin for the first 24 hours  Do not participate in strenuous activities such as-tennis, aerobics, weight lifting, etc  for 24 hours  Refrain from swimming/soaking for 72 hours  Wearing a bra for sleeping may be more comfortable for the first 24-48 hours after your biopsy  Watch for continued bleeding, pain or fever over 101  If any of these symptoms occur, please contact our breast nurse navigator at the location where your biopsy was performed  During normal business hours (7:30am - 4:00pm) please call the nurse navigator at the site     where your procedure was performed:       [facility] [address]: [phone] or      [address] [place] [address] Street: [phone] or [phone]     [address]: [address] [facility]: [address]: [phone]        After 4pm - please call your physician or go to the nearest Emergency Department location  The final results of your biopsy are usually available within one week          POST LARGE CORE BREAST BIOPSY PATIENT INFORMATION      Place an ice pack inside your bra over the top of the dressing every hour for 20 minutes (20 minutes on, 60 minutes off)  Do this until bedtime  Do not shower or bathe until the following morning  After bathing, you may remove the bandaid  You may bathe your breast carefully with the steri-strips in place  Be careful not to loosen them  The steri-strips will fall off in 3-5 days  You may have mild discomfort, and you may have some bruising where the needle entered the skin  This should clear within 5-7 days  If you need medicine for discomfort, take acetaminophen products such as Tylenol  You may also take Advil or Motrin products  DO NOT use Aspirin for the first 24 hours  Do not participate in strenuous activities such as-tennis, aerobics, weight lifting, etc  for 24 hours  Refrain from swimming/soaking for 72 hours  Wearing a bra for sleeping may be more comfortable for the first 24-48 hours after your biopsy  Watch for continued bleeding, pain or fever over 101  If any of these symptoms occur, please contact our breast nurse navigator at the location where your biopsy was performed  During normal business hours (7:30am - 4:00pm) please call the nurse navigator at the site     where your procedure was performed:       [address] [facility] [address]: [phone] or      [address] [facility] [address]: [phone] or [phone]     Pr[address]hak[address] García [address]: 10[address] [facility]: [address] [facility] 60: [phone]        After 4pm - please call your physician or go to the nearest Emergency Department location  The final results of your biopsy are usually available within one week

## 2023-01-09 ENCOUNTER — ANNUAL EXAM (OUTPATIENT)
Dept: OBGYN CLINIC | Facility: CLINIC | Age: 68
End: 2023-01-09

## 2023-01-09 VITALS
DIASTOLIC BLOOD PRESSURE: 98 MMHG | HEIGHT: 64 IN | BODY MASS INDEX: 28.51 KG/M2 | SYSTOLIC BLOOD PRESSURE: 160 MMHG | WEIGHT: 167 LBS

## 2023-01-09 DIAGNOSIS — Z91.89 GYN EXAM FOR HIGH-RISK MEDICARE PATIENT: Primary | ICD-10-CM

## 2023-01-09 DIAGNOSIS — Z01.419 ENCOUNTER FOR ANNUAL ROUTINE GYNECOLOGICAL EXAMINATION: ICD-10-CM

## 2023-01-09 NOTE — PROGRESS NOTES
Assessment/Plan:    70 yo  - annual exam      Problem List Items Addressed This Visit    None  Visit Diagnoses     Encounter for annual routine gynecological examination    -  Primary  Pap not indicated  F/u breast biopsy results  Return in 1 year or prn            Subjective:      Patient ID: Natale Bamberger is a 69 y o  female  This is a 69 y o  postmenopausal  who presents for annual exam      Concerns: none    H/o multiple abdominal surgeries and recent abdominal abscess which was drained  Sees specialist at [facility] who is monitoring  May need exploratory surgery  Had breast biopsy on 23  She denies vaginal bleeding, discharge, or pelvic pain  Sexually active: yes, no pain, some dryness uses lubricants  STD testing: no  Urinary concerns: no   Bowel movements: same     Screening:  Last pap smear: 9/14/15-neg/neg, no longer indicated  Last mammogram: 22-moderate suspicion for malignancy of L breast, biopsy still pending from 23  Colon Cancer screenin18-repeat in 3-5 years  Sees Dr Moses Lee   DEXA: 10/11/18-normal    Family history:  Breast cancer: mother (45s) and sister (61s)  Genetics negative  Colon cancer: none  Ovarian cancer: none  Cervical cancer: mother    Body mass index is 29 12 kg/m²  Exercise: limited with health issues  Diet:  healthy  Smoking: non smoker        The following portions of the patient's history were reviewed and updated as appropriate: allergies, current medications, past family history, past medical history, past social history, past surgical history and problem list     Review of Systems   Constitutional: Negative  HENT: Negative  Eyes: Negative  Respiratory: Negative  Cardiovascular: Negative  Gastrointestinal: Negative  Endocrine: Negative  Genitourinary: Negative for dyspareunia, dysuria, frequency, menstrual problem, pelvic pain, vaginal discharge and vaginal pain  Musculoskeletal: Negative  Skin: Negative  Allergic/Immunologic: Negative  Neurological: Negative  Hematological: Negative  Psychiatric/Behavioral: Negative  Objective:      /98 (BP Location: Right arm, Patient Position: Sitting, Cuff Size: Adult)   Ht 5' 3 5" (1 613 m)   Wt 75 8 kg (167 lb)   LMP  (LMP Unknown)   BMI 29 12 kg/m²          Physical Exam  Vitals reviewed  Cardiovascular:      Rate and Rhythm: Normal rate  Pulmonary:      Effort: Pulmonary effort is normal    Chest:   Breasts:     Breasts are symmetrical       Right: No mass, nipple discharge, skin change or tenderness  Left: No mass, nipple discharge, skin change or tenderness  Abdominal:      Palpations: Abdomen is soft  Genitourinary:     Labia:         Right: No rash  Left: No rash  Vagina: Normal  No signs of injury  Cervix: No cervical motion tenderness, discharge, friability or lesion  Uterus: Not deviated, not enlarged, not fixed and not tender  Adnexa:         Right: No mass  Left: No mass  Comments: +atrophy  Musculoskeletal:      Cervical back: Normal range of motion  Skin:     General: Skin is warm and dry  Neurological:      Mental Status: She is alert and oriented to person, place, and time     Psychiatric:         Behavior: Behavior normal

## 2023-01-09 NOTE — PROGRESS NOTES
Post procedure call completed    Bleeding: _____yes __X___no (pt denies)    Pain: _____yes ___X___no (pt denies, used ice, took Tylenol)    Redness/Swelling: ______yes ___X___no (pt denies)    Band aid removed: __X___yes _____no     Steri-Strips intact: ___X___yes _____no (discussed with patient to remove steri strips on Wednesday if they have not come off on their own)    Pt with no questions at this time, adv will call when results available, adv to call with any questions or concerns, has name/# for contact

## 2023-01-10 ENCOUNTER — TELEPHONE (OUTPATIENT)
Dept: MAMMOGRAPHY | Facility: CLINIC | Age: 68
End: 2023-01-10

## 2023-01-31 ENCOUNTER — OFFICE VISIT (OUTPATIENT)
Dept: SURGICAL ONCOLOGY | Facility: CLINIC | Age: 68
End: 2023-01-31

## 2023-01-31 VITALS
OXYGEN SATURATION: 97 % | HEART RATE: 76 BPM | SYSTOLIC BLOOD PRESSURE: 138 MMHG | RESPIRATION RATE: 16 BRPM | WEIGHT: 167 LBS | BODY MASS INDEX: 28.51 KG/M2 | HEIGHT: 64 IN | TEMPERATURE: 96.9 F | DIASTOLIC BLOOD PRESSURE: 86 MMHG

## 2023-01-31 DIAGNOSIS — Z79.811 USE OF ANASTROZOLE (ARIMIDEX): ICD-10-CM

## 2023-01-31 DIAGNOSIS — C50.412 MALIGNANT NEOPLASM OF UPPER-OUTER QUADRANT OF LEFT BREAST IN FEMALE, ESTROGEN RECEPTOR POSITIVE (HCC): Primary | ICD-10-CM

## 2023-01-31 DIAGNOSIS — Z17.0 MALIGNANT NEOPLASM OF UPPER-OUTER QUADRANT OF LEFT BREAST IN FEMALE, ESTROGEN RECEPTOR POSITIVE (HCC): Primary | ICD-10-CM

## 2023-01-31 RX ORDER — OXYCODONE HYDROCHLORIDE AND ACETAMINOPHEN 325; 5 MG/5ML; MG/5ML
SOLUTION ORAL EVERY 4 HOURS PRN
COMMUNITY

## 2023-01-31 NOTE — PROGRESS NOTES
Surgical Oncology Follow Up       [address]  [facility] SURGICAL ONCOLOGY [address] [address]    Oumar Hewitt  1955  [id number]  [address] [facility],[address]  [facility] SURGICAL ONCOLOGY [place] [address] [address] [address]    Chief Complaint   Patient presents with   • office visit       Assessment/Plan:  1  Malignant neoplasm of upper-outer quadrant of left breast in female, estrogen receptor positive ([address] )  - 6 month follow up    2  Use of anastrozole (Arimidex)  - continue use per medical oncology     Discussion/Summary: Patient is a 49-year-old female presenting today for a six-month follow-up for left breast cancer diagnosed in November 2019  Pathology revealed invasive mammary carcinoma ER/AL positive, HER2 negative  She underwent genetic testing which was negative  She had a left breast lumpectomy with sentinel node biopsy with Dr Loni Barahona and completed whole breast radiation therapy  She is currently on anastrozole  She had a bilateral diagnostic mammogram on 12/2/2022 which was BI-RADS 4 category 2 density  Biopsy was recommended for the left breast for calcifications and pathology revealed fibroadenomatoid change, benign calcifications in benign ducts  There were no concerns on her cbe  Patient has an abdominal wound vac s/p exploratory laparotomy celiotomy for abdominal abscess  Surgery was on 1/24  I will see the patient back in 6 months or sooner should the need arise  She was instructed to call with any questions or concerns prior to this time   All questions were answered today          History of Present Illness:     Oncology History   Malignant neoplasm of upper-outer quadrant of left breast in female, estrogen receptor positive ([facility] [address] )   12/23/2019 Biopsy    Left breast ultrasound-guided biopsy  2 o'clock, 10 cm from nipple  Invasive mammary carcinoma of no special type  Grade 1    AL 90  HER2 1+    Concordant  Appears unifocal - difficulty assessing accurate size on mammo vs US  2 1 cm vs 1 1 cm; possible concern for adjacent mass on original US not able to be visualized on biopsy  Left axilla US negative  Right breast clear  1/17/2020 Genetic Testing    The following genes were evaluated: ALEKSANDAR, BARD1, BRCA1, BRCA2, BRIP1, CDH1, CHEK2, NBN, NF1, PALB2, PTEN, RAD50, STK11, TP53  Negative result  No pathogenic sequence variants or deletions/dupllications identified  [facility]     2/28/2020 Surgery    Left breast needle localized lumpectomy with sentinel lymph node biopsy  Invasive carcinoma of no special type  Grade 1  1 5 cm  Margins negative  0/1 Lymph node  Anatomic/Prognostic Stage IA     4/22/2020 -  Hormone Therapy    Anastrozole 1 mg daily  Dr Susan Scanlon     5/11/2020 - 6/8/2020 Radiation    entire left breast to 4005cGy in 15 daily 267cGy fractions followed by an additional  1000cGy in 5 daily fractions to the lumpectomy cavity  Total dose to the lumpectomy cavity was 5005cGy  Total number of fractions: 20       Dr Fide Lombardo          -Interval History:  Patient is a 66-year-old female presenting today for a six-month follow-up for left breast cancer diagnosed in November 2019  She had a bilateral diagnostic mammogram on 12/2/2022 which was BI-RADS 4 category 2 density  Biopsy was recommended for the left breast for calcifications and pathology revealed fibroadenomatoid change, benign calcifications in benign ducts  Patient denies changes on her breast exam  She denies persistent headaches, bone pain, back pain, shortness of breath, or abdominal pain  Review of Systems:  Review of Systems   Constitutional: Negative for activity change, appetite change, fatigue and unexpected weight change  Respiratory: Negative for cough and shortness of breath  Cardiovascular: Negative for chest pain  Gastrointestinal: Negative for abdominal pain, diarrhea, nausea and vomiting     Endocrine: Negative for heat intolerance  Musculoskeletal: Negative for arthralgias, back pain and myalgias  Skin: Negative for rash  Neurological: Negative for weakness and headaches  Hematological: Negative for adenopathy  Patient Active Problem List   Diagnosis   • DVT of upper extremity (deep vein thrombosis) (HCC)   • Type I diabetes mellitus, uncontrolled   • Encounter for gynecological examination without abnormal finding   • Malignant neoplasm of upper-outer quadrant of left breast in female, estrogen receptor positive ([facility] [address] )   • Use of anastrozole (Arimidex)   • Hypertension associated with diabetes ([facility] [address] )     Past Medical History:   Diagnosis Date   • BRCA gene mutation negative 01/2020    [facility]   • BRCA1 negative    • BRCA2 negative    • Breast cancer ([facility] [address] ) 12/2019    left   • Diabetes mellitus ([facility] [address] )    • History of radiation therapy 01/2020    left breast ca   • History of transfusion     2010   • Hypertension    • Pancreatitis     pancreatic cyst that burst   • Pulmonary embolism on left [facility])     and right side     Past Surgical History:   Procedure Laterality Date   • BREAST BIOPSY  2019   • BREAST CYST EXCISION Left 2009    benign   • BREAST LUMPECTOMY Left 02/28/2020    Procedure: BREAST NEEDLE LOCALIZED LUMPECTOMY (NEEDLE LOC AT 0900); Surgeon: Fabiana Munson MD;  [facility];  Service: Surgical Oncology   • COLONOSCOPY     • EYE SURGERY Bilateral     Cataract   • FISTULA REPAIR      small intestine   • ILEOSTOMY      with reversal   • LYMPH NODE BIOPSY Left 02/28/2020    Procedure: SENTINEL LYMPH NODE BIOPSY; LYMPHATIC MAPPING WITH BLUE DYE AND RADIOACTIVE DYE (INJECT AT 1100 BY DR BENOIT IN THE OR);   Surgeon: Fabiana Munson MD;  [facility];  Service: Surgical Oncology   • MAMMO NEEDLE LOCALIZATION LEFT (ALL INC) Left 02/28/2020   • MAMMO STEREOTACTIC BREAST BIOPSY LEFT (ALL INC) Left 1/6/2023   • TRACHEOSTOMY  2010    with repair   • US GUIDED BREAST BIOPSY LEFT COMPLETE Left 12/23/2019 Family History   Problem Relation Age of Onset   • Breast cancer Mother 43   • Cervical cancer Mother 58   • Breast cancer Sister 61   • No Known Problems Maternal Grandmother    • No Known Problems Paternal Grandmother    • No Known Problems Maternal Aunt    • Bone cancer Paternal Aunt 43   • No Known Problems Paternal Aunt    • Cancer Father         bladder   • Breast cancer Other      Social History     Socioeconomic History   • Marital status: /Civil Union     Spouse name: Not on file   • Number of children: Not on file   • Years of education: Not on file   • Highest education level: Not on file   Occupational History   • Not on file   Tobacco Use   • Smoking status: Never   • Smokeless tobacco: Never   Vaping Use   • Vaping Use: Never used   Substance and Sexual Activity   • Alcohol use: No   • Drug use: No   • Sexual activity: Yes     Partners: Male     Birth control/protection: Post-menopausal, Female Sterilization   Other Topics Concern   • Not on file   Social History Narrative   • Not on file     Social Determinants of Health     Financial Resource Strain: Not on file   Food Insecurity: Not on file   Transportation Needs: Not on file   Physical Activity: Not on file   Stress: Not on file   Social Connections: Not on file   Intimate Partner Violence: Not on file   Housing Stability: Not on file       Current Outpatient Medications:   •  anastrozole (ARIMIDEX) 1 mg tablet, take 1 tablet by mouth once daily, Disp: 90 tablet, Rfl: 1  •  Aspirin 81 MG EC tablet, Take by mouth, Disp: , Rfl:   •  Calcium-Vitamin D-Vitamin K 650-12 5-40 MG-MCG-MCG CHEW, , Disp: , Rfl:   •  Cholecalciferol (VITAMIN D3 PO), Take by mouth (Patient not taking: Reported on 1/9/2023), Disp: , Rfl:   •  Cyanocobalamin (VITAMIN B 12) 100 MCG LOZG, Take by mouth, Disp: , Rfl:   •  fexofenadine (ALLEGRA) 60 MG tablet, Take by mouth, Disp: , Rfl:   •  FREESTYLE LITE test strip, use 1 TEST STRIP to TEST BLOOD SUGAR four times a day, Disp: , Rfl:   •  hydrocortisone 0 5 % cream, Apply topically 2 (two) times a day, Disp: , Rfl:   •  Insulin Disposable Pump (OmniPod 5 Pack) MISC, CHANGE POD EVERY 3 DAYS, Disp: , Rfl:   •  Insulin Infusion Pump KIT, by Does not apply route , Disp: , Rfl:   •  losartan (COZAAR) 50 mg tablet, Take 50 mg by mouth daily, Disp: , Rfl:   •  LOSARTAN POTASSIUM PO, Take 50 mg by mouth, Disp: , Rfl:   •  Nikki 128 5 % hypertonic ophthalmic solution, INSTILL 1 DROP INTO BOTH EYES EVERY EVENING AS DIRECTED, Disp: , Rfl:   •  NOVOLOG 100 UNIT/ML injection, , Disp: , Rfl:   •  Omega-3 Fatty Acids (FISH OIL) 1,000 mg, Take 1,000 mg by mouth, Disp: , Rfl:   •  Polyethylene Glycol 3350 (MIRALAX PO), , Disp: , Rfl:   •  prednisoLONE acetate (PRED FORTE) 1 % ophthalmic suspension, instill 1 drop into right eye four times a day, Disp: , Rfl:   •  Specialty Vitamins Products (MARTI-RX DIABETIC VITAMIN PO), Take by mouth daily, Disp: , Rfl:   Allergies   Allergen Reactions   • Penicillins Hives   • Ampicillin Rash   • Sulfa Antibiotics Fatigue     Vitals:    01/31/23 1130   Resp: 16       Physical Exam  Constitutional:       General: She is not in acute distress  Appearance: Normal appearance  Cardiovascular:      Rate and Rhythm: Normal rate and regular rhythm  Pulses: Normal pulses  Heart sounds: Normal heart sounds  Pulmonary:      Effort: Pulmonary effort is normal       Breath sounds: Normal breath sounds  Chest:      Chest wall: No mass  Breasts:     Right: No swelling, bleeding, inverted nipple, mass, nipple discharge, skin change or tenderness  Left: No swelling, bleeding, inverted nipple, mass, nipple discharge, skin change or tenderness  Comments: Left breast lumpectomy scar  No masses, nodularity, skin changes, nipple changes or discharge, or adenopathy appreciated on physical exam      Abdominal:      General: Abdomen is flat  Palpations: Abdomen is soft     Lymphadenopathy: Upper Body:      Right upper body: No supraclavicular, axillary or pectoral adenopathy  Left upper body: No supraclavicular, axillary or pectoral adenopathy  Skin:     General: Skin is warm  Neurological:      General: No focal deficit present  Mental Status: She is alert and oriented to person, place, and time  Psychiatric:         Mood and Affect: Mood normal          Behavior: Behavior normal            Results:    Imaging  Mammo stereotactic breast biopsy left (all inc), Mammo post biopsy left    Addendum Date: 1/10/2023 Addendum:   ADDENDUM: PATHOLOGY RESULTS: E) Calcifications (Left; Upper Outer; 1 o'clock; Posterior) Benign finding: the pathology findings indicate fibroadenomatoid change, benign calcifications, and benign ducts  Findings are concordant with imaging  Return to yearly diagnostic mammography schedule is recommended  RECOMMENDATION:      - Diagnostic Mammogram in 1 year for both breasts  END ADDENDUM    Result Date: 1/6/2023  Narrative: DIAGNOSIS: Abnormal mammogram INDICATION: Chu Bajwa is a 69 y o  female presenting for left breast stereotactic biopsy  Prior to the procedure, previous imaging was reviewed  The procedure was explained to the patient in detail  All questions were answered  Written and verbal informed consent was obtained  FINDINGS: LEFT E) CALCIFICATIONS Mammo stereotactic breast biopsy left (all inc): Images of the left breast calcifications in the upper outer quadrant at 1 o'clock in the posterior portion were obtained  The patient was placed upright on the biopsy table  A core needle biopsy was performed under stereotactic mammographic guidance using a superior approach  The skin was prepped in the usual fashion  Anesthesia was administered using 5 mL of lidocaine 1% and 20 mL of lidocaine 1% with epinephrine  An 8 G device was advanced  A clip was inserted into the target area   Estellaell clip Post-placement digital mammographic imaging demonstrates the clip was at the site  The patient tolerated the procedure well  There were no immediate complications  SPECIMEN: Radiography of the core specimens reveal calcification within several core samples  The specimens were sent to Pathology for evaluation  Impression:  Technically successful left breast stereotactic biopsy  Pathology results are pending  RECOMMENDATION:      - Waiting for pathology for the left breast  Workstation ID: [id number]        I reviewed the above imaging data  Advance Care Planning/Advance Directives:  Discussed disease status, cancer treatment plans and/or cancer treatment goals with the patient

## 2023-03-19 DIAGNOSIS — C50.412 MALIGNANT NEOPLASM OF UPPER-OUTER QUADRANT OF LEFT BREAST IN FEMALE, ESTROGEN RECEPTOR POSITIVE (HCC): ICD-10-CM

## 2023-03-19 DIAGNOSIS — Z17.0 MALIGNANT NEOPLASM OF UPPER-OUTER QUADRANT OF LEFT BREAST IN FEMALE, ESTROGEN RECEPTOR POSITIVE (HCC): ICD-10-CM

## 2023-03-20 RX ORDER — ANASTROZOLE 1 MG/1
TABLET ORAL
Qty: 90 TABLET | Refills: 1 | Status: SHIPPED | OUTPATIENT
Start: 2023-03-20

## 2023-08-14 ENCOUNTER — TELEPHONE (OUTPATIENT)
Dept: HEMATOLOGY ONCOLOGY | Facility: CLINIC | Age: 68
End: 2023-08-14

## 2023-08-14 ENCOUNTER — TELEPHONE (OUTPATIENT)
Dept: SURGICAL ONCOLOGY | Facility: CLINIC | Age: 68
End: 2023-08-14

## 2023-08-14 ENCOUNTER — OFFICE VISIT (OUTPATIENT)
Dept: SURGICAL ONCOLOGY | Facility: CLINIC | Age: 68
End: 2023-08-14
Payer: MEDICARE

## 2023-08-14 VITALS
BODY MASS INDEX: 29.02 KG/M2 | TEMPERATURE: 96.9 F | HEIGHT: 64 IN | HEART RATE: 105 BPM | WEIGHT: 170 LBS | RESPIRATION RATE: 20 BRPM | OXYGEN SATURATION: 98 % | DIASTOLIC BLOOD PRESSURE: 74 MMHG | SYSTOLIC BLOOD PRESSURE: 126 MMHG

## 2023-08-14 DIAGNOSIS — Z79.811 USE OF ANASTROZOLE (ARIMIDEX): ICD-10-CM

## 2023-08-14 DIAGNOSIS — Z17.0 MALIGNANT NEOPLASM OF UPPER-OUTER QUADRANT OF LEFT BREAST IN FEMALE, ESTROGEN RECEPTOR POSITIVE (HCC): Primary | ICD-10-CM

## 2023-08-14 DIAGNOSIS — C50.412 MALIGNANT NEOPLASM OF UPPER-OUTER QUADRANT OF LEFT BREAST IN FEMALE, ESTROGEN RECEPTOR POSITIVE (HCC): Primary | ICD-10-CM

## 2023-08-14 PROCEDURE — 99214 OFFICE O/P EST MOD 30 MIN: CPT

## 2023-08-14 RX ORDER — ANASTROZOLE 1 MG/1
1 TABLET ORAL DAILY
Qty: 90 TABLET | Refills: 1 | Status: SHIPPED | OUTPATIENT
Start: 2023-08-14

## 2023-08-14 NOTE — TELEPHONE ENCOUNTER
Patient called in stating she was exposed to covid over the weekend but tested herself and came out negative. Patient wants to know if she should cancel her appt.

## 2023-08-14 NOTE — TELEPHONE ENCOUNTER
Patient Call    Who are you speaking with? Patient    If it is not the patient, are they listed on an active communication consent form? N/A   What is the reason for this call? Patient calling in stating she was exposed to Covid. During the call, the line went silent and was disconnected. Does this require a call back? No   If a call back is required, please list best call back number N/A   If a call back is required, advise that a message will be forwarded to their care team and someone will return their call as soon as possible. Did you relay this information to the patient?  No

## 2023-08-14 NOTE — PROGRESS NOTES
Surgical Oncology Follow Up       [address] [facility] SURGICAL ONCOLOGY [place]  [address] [place] [address] [place]  [facility] [address]    Charmayne Huntsman  1955  [id number]  [address] [facility] SURGICAL ONCOLOGY [facility] [address]    Chief Complaint   Patient presents with   • Follow-up       Assessment/Plan:  1. Malignant neoplasm of upper-outer quadrant of left breast in female, estrogen receptor positive ([address])  - 6 mo follow up  - Mammo diagnostic bilateral w 3d & cad; Future    2. Use of anastrozole (Arimidex)  - continue use per medical oncology       Discussion/Summary:  Patient is a 78-year-old female presenting today for a six-month follow-up for left breast cancer diagnosed in November 2019. Pathology revealed invasive mammary carcinoma ER/NC positive, HER2 negative. She underwent genetic testing which was negative. She had a left breast lumpectomy with sentinel node biopsy with Dr. Zita Rutherford and completed whole breast radiation therapy. She is currently on anastrozole. We will get her scheduled for mammogram in Dec. Per patients request I renewed her anastrozole. She is seeing med/onc in January. There were no concerns on her cbe. I will see the patient back in 6 months or sooner should the need arise. She was instructed to call with any questions or concerns prior to this time. All questions were answered today. History of Present Illness:     Oncology History   Malignant neoplasm of upper-outer quadrant of left breast in female, estrogen receptor positive ([address])   12/23/2019 Biopsy    Left breast ultrasound-guided biopsy  2 o'clock, 10 cm from nipple  Invasive mammary carcinoma of no special type  Grade 1    NC 90  HER2 1+    Concordant. Appears unifocal - difficulty assessing accurate size on mammo vs US. 2.1 cm vs 1.1 cm; possible concern for adjacent mass on original US not able to be visualized on biopsy.  Left axilla US negative. Right breast clear. 1/17/2020 Genetic Testing    The following genes were evaluated: ALEKSANDAR, BARD1, BRCA1, BRCA2, BRIP1, CDH1, CHEK2, NBN, NF1, PALB2, PTEN, RAD50, STK11, TP53  Negative result. No pathogenic sequence variants or deletions/dupllications identified  [employer]ae     2/28/2020 Surgery    Left breast needle localized lumpectomy with sentinel lymph node biopsy  Invasive carcinoma of no special type  Grade 1  1.5 cm  Margins negative  0/1 Lymph node  Anatomic/Prognostic Stage IA     4/22/2020 -  Hormone Therapy    Anastrozole 1 mg daily  Dr. Bernardo May     5/11/2020 - 6/8/2020 Radiation    entire left breast to 4005cGy in 15 daily 267cGy fractions followed by an additional  1000cGy in 5 daily fractions to the lumpectomy cavity. Total dose to the lumpectomy cavity was 5005cGy. Total number of fractions: 20.      Dr Yarely Guillen          -Interval History:  Patient is a 68-year-old female presenting today for a six-month follow-up for left breast cancer diagnosed in November 2019. She is currently on anastrozole. Patient denies changes on her breast exam. She denies persistent headaches, bone pain, back pain, shortness of breath, or abdominal pain. Review of Systems:  Review of Systems   Constitutional: Negative for activity change, appetite change, fatigue and unexpected weight change. Respiratory: Negative for cough and shortness of breath. Cardiovascular: Negative for chest pain. Gastrointestinal: Negative for abdominal pain, diarrhea, nausea and vomiting. Endocrine: Negative for heat intolerance. Musculoskeletal: Positive for arthralgias. Negative for back pain and myalgias. Skin: Negative for rash. Neurological: Negative for weakness and headaches. Hematological: Negative for adenopathy.        Patient Active Problem List   Diagnosis   • DVT of upper extremity (deep vein thrombosis) (HCC)   • Type I diabetes mellitus, uncontrolled   • Encounter for gynecological examination without abnormal finding   • Malignant neoplasm of upper-outer quadrant of left breast in female, estrogen receptor positive ([address])   • Use of anastrozole (Arimidex)   • Hypertension associated with diabetes [facility])     Past Medical History:   Diagnosis Date   • BRCA gene mutation negative 01/2020    [employer]   • BRCA1 negative    • BRCA2 negative    • Breast cancer ([address]) 12/2019    left   • Diabetes mellitus ([address])    • History of radiation therapy 01/2020    left breast ca   • History of transfusion     2010   • Hypertension    • Pancreatitis     pancreatic cyst that burst   • Pulmonary embolism on left [facility])     and right side     Past Surgical History:   Procedure Laterality Date   • BREAST BIOPSY  2019   • BREAST CYST EXCISION Left 2009    benign   • BREAST LUMPECTOMY Left 02/28/2020    Procedure: BREAST NEEDLE LOCALIZED LUMPECTOMY (NEEDLE LOC AT 0900); Surgeon: Jackson Grove MD;  [facility];  Service: Surgical Oncology   • COLONOSCOPY     • EYE SURGERY Bilateral     Cataract   • FISTULA REPAIR      small intestine   • ILEOSTOMY      with reversal   • LYMPH NODE BIOPSY Left 02/28/2020    Procedure: SENTINEL LYMPH NODE BIOPSY; LYMPHATIC MAPPING WITH BLUE DYE AND RADIOACTIVE DYE (INJECT AT 1100 BY DR BENOIT IN THE OR);   Surgeon: Jackson Grove MD;  [facility];  Service: Surgical Oncology   • MAMMO NEEDLE LOCALIZATION LEFT (ALL INC) Left 02/28/2020   • MAMMO STEREOTACTIC BREAST BIOPSY LEFT (ALL INC) Left 1/6/2023   • TRACHEOSTOMY  2010    with repair   • US GUIDED BREAST BIOPSY LEFT COMPLETE Left 12/23/2019     Family History   Problem Relation Age of Onset   • Breast cancer Mother 43   • Cervical cancer Mother 58   • Breast cancer Sister 61   • No Known Problems Maternal Grandmother    • No Known Problems Paternal Grandmother    • No Known Problems Maternal Aunt    • Bone cancer Paternal Aunt 43   • No Known Problems Paternal Aunt    • Cancer Father         bladder   • Breast cancer Other      Social History     Socioeconomic History   • Marital status: /Civil Union     Spouse name: Not on file   • Number of children: Not on file   • Years of education: Not on file   • Highest education level: Not on file   Occupational History   • Not on file   Tobacco Use   • Smoking status: Never   • Smokeless tobacco: Never   Vaping Use   • Vaping Use: Never used   Substance and Sexual Activity   • Alcohol use: No   • Drug use: No   • Sexual activity: Yes     Partners: Male     Birth control/protection: Post-menopausal, Female Sterilization   Other Topics Concern   • Not on file   Social History Narrative   • Not on file     Social Determinants of Health     Financial Resource Strain: Not on file   Food Insecurity: Not on file   Transportation Needs: Not on file   Physical Activity: Not on file   Stress: Not on file   Social Connections: Not on file   Intimate Partner Violence: Not on file   Housing Stability: Not on file       Current Outpatient Medications:   •  anastrozole (ARIMIDEX) 1 mg tablet, take 1 tablet by mouth once daily, Disp: 90 tablet, Rfl: 1  •  Aspirin 81 MG EC tablet, Take by mouth, Disp: , Rfl:   •  Calcium-Vitamin D-Vitamin K 650-12.5-40 MG-MCG-MCG CHEW, , Disp: , Rfl:   •  Cholecalciferol (VITAMIN D3 PO), Take by mouth, Disp: , Rfl:   •  Cyanocobalamin (VITAMIN B 12) 100 MCG LOZG, Take by mouth, Disp: , Rfl:   •  fexofenadine (ALLEGRA) 60 MG tablet, Take by mouth, Disp: , Rfl:   •  FREESTYLE LITE test strip, use 1 TEST STRIP to TEST BLOOD SUGAR four times a day, Disp: , Rfl:   •  hydrocortisone 0.5 % cream, Apply topically 2 (two) times a day, Disp: , Rfl:   •  Insulin Disposable Pump (OmniPod 5 Pack) MISC, CHANGE POD EVERY 3 DAYS, Disp: , Rfl:   •  Insulin Infusion Pump KIT, by Does not apply route , Disp: , Rfl:   •  losartan (COZAAR) 50 mg tablet, Take 50 mg by mouth daily, Disp: , Rfl:   •  LOSARTAN POTASSIUM PO, Take 50 mg by mouth, Disp: , Rfl:   •  Nikki 128 5 % hypertonic ophthalmic solution, INSTILL 1 DROP INTO BOTH EYES EVERY EVENING AS DIRECTED, Disp: , Rfl:   •  NOVOLOG 100 UNIT/ML injection, , Disp: , Rfl:   •  Omega-3 Fatty Acids (FISH OIL) 1,000 mg, Take 1,000 mg by mouth, Disp: , Rfl:   •  Polyethylene Glycol 3350 (MIRALAX PO), , Disp: , Rfl:   •  Specialty Vitamins Products (MARTI-RX DIABETIC VITAMIN PO), Take by mouth daily, Disp: , Rfl:   •  oxyCODONE-acetaminophen (ROXICET) 5-325 mg/5 mL solution, Take by mouth every 4 (four) hours as needed for moderate pain (Patient not taking: Reported on 8/14/2023), Disp: , Rfl:   •  prednisoLONE acetate (PRED FORTE) 1 % ophthalmic suspension, instill 1 drop into right eye four times a day (Patient not taking: Reported on 8/14/2023), Disp: , Rfl:   Allergies   Allergen Reactions   • Penicillins Hives   • Ampicillin Rash   • Sulfa Antibiotics Fatigue     Vitals:    08/14/23 1014   BP: 126/74   Pulse: 105   Resp: 20   Temp: (!) 96.9 °F (36.1 °C)   SpO2: 98%       Physical Exam  Constitutional:       General: She is not in acute distress. Appearance: Normal appearance. Cardiovascular:      Rate and Rhythm: Normal rate and regular rhythm. Pulses: Normal pulses. Heart sounds: Normal heart sounds. Pulmonary:      Effort: Pulmonary effort is normal.      Breath sounds: Normal breath sounds. Chest:      Chest wall: No mass. Breasts:     Right: No swelling, bleeding, inverted nipple, mass, nipple discharge, skin change or tenderness. Left: Tenderness present. No swelling, bleeding, inverted nipple, mass, nipple discharge or skin change. Abdominal:      General: Abdomen is flat. Palpations: Abdomen is soft. Lymphadenopathy:      Upper Body:      Right upper body: No supraclavicular, axillary or pectoral adenopathy. Left upper body: No supraclavicular, axillary or pectoral adenopathy. Skin:     General: Skin is warm. Neurological:      General: No focal deficit present.       Mental Status: She is alert and oriented to person, place, and time. Psychiatric:         Mood and Affect: Mood normal.         Behavior: Behavior normal.           Results:    Imaging  No results found. I reviewed the above imaging data. Advance Care Planning/Advance Directives:  Discussed disease status, cancer treatment plans and/or cancer treatment goals with the patient.

## 2023-09-13 RX ORDER — SENNOSIDES 8.6 MG
650 CAPSULE ORAL
COMMUNITY

## 2023-09-14 ENCOUNTER — OFFICE VISIT (OUTPATIENT)
Dept: GASTROENTEROLOGY | Facility: CLINIC | Age: 68
End: 2023-09-14
Payer: MEDICARE

## 2023-09-14 ENCOUNTER — TELEPHONE (OUTPATIENT)
Dept: GASTROENTEROLOGY | Facility: CLINIC | Age: 68
End: 2023-09-14

## 2023-09-14 VITALS
HEART RATE: 83 BPM | HEIGHT: 64 IN | OXYGEN SATURATION: 98 % | BODY MASS INDEX: 29.19 KG/M2 | DIASTOLIC BLOOD PRESSURE: 75 MMHG | WEIGHT: 171 LBS | SYSTOLIC BLOOD PRESSURE: 128 MMHG

## 2023-09-14 DIAGNOSIS — Z83.71 FAMILY HISTORY OF COLONIC POLYPS: Primary | ICD-10-CM

## 2023-09-14 DIAGNOSIS — Z86.010 HISTORY OF COLON POLYPS: ICD-10-CM

## 2023-09-14 PROCEDURE — 99203 OFFICE O/P NEW LOW 30 MIN: CPT | Performed by: INTERNAL MEDICINE

## 2023-09-14 RX ORDER — UBIDECARENONE 75 MG
CAPSULE ORAL DAILY
COMMUNITY

## 2023-09-14 NOTE — TELEPHONE ENCOUNTER
From 9/14 OV Dr Yuni Sousa ordered a Colonoscopy patient just had surgery and Dr Yuni Sousa would like her to wait 3 to 6 months  I let patient know we would reach out to her in the beginning of December and we will pick a date   Reviewed and gave patient Dulco/Miralax prep Instructions  Patient is on Novolog Insulin pump patient is ging to call her Endo  for instructions

## 2023-09-15 NOTE — PROGRESS NOTES
[facility] - Outpatient Consultation  Denver Overland 77 y.o. female MRN: [id number]  Encounter: [id number]          ASSESSMENT AND PLAN:      1. Family history of colonic polyps    2. History of colon polyps    Schedule colonoscopy in 3-6 months    ______________________________________________________________________    HPI: Rona Tong is a 52-year-old female who comes the office to schedule a routine but delayed colonoscopy. Her mother had the diagnosis of colon polyp. The patient had a colonoscopy performed February 19, 2018 which showed evidence of colon polyps which were adenomas. She denies any change of her bowel habits. She is she denies diarrhea but does have constipation off-and-on. She denies abdominal pain, rectal bleeding, nausea, vomiting, heartburn, dysphagia, odynophagia. She has a history of a small hiatal hernia. She has had history of resection of the small intestine at [address] [place] [address] in 2016. Most recently she developed abscesses in her lower abdominal wall with spontaneous rupture. She has undergone surgery and still has a bandage over one of the wounds of the lower abdomen. Based on this complication, she would like to hold off on performing colonoscopies for several months until the lower abdominal wound has a time to heal.      REVIEW OF SYSTEMS:    CONSTITUTIONAL: Denies any fever, chills, rigors, and weight loss. HEENT: No earache or tinnitus. Denies hearing loss or visual disturbances. CARDIOVASCULAR: No chest pain or palpitations. RESPIRATORY: Denies any cough, hemoptysis, shortness of breath or dyspnea on exertion. GASTROINTESTINAL: As noted in the History of Present Illness. GENITOURINARY: No problems with urination. Denies any hematuria or dysuria. NEUROLOGIC: No dizziness or vertigo, denies headaches. MUSCULOSKELETAL: Denies any muscle or joint pain. SKIN: Denies skin rashes or itching.    ENDOCRINE: Denies excessive thirst. Denies intolerance to heat or cold. PSYCHOSOCIAL: Denies depression or anxiety. Denies any recent memory loss. Historical Information   Past Medical History:   Diagnosis Date   • BRCA gene mutation negative 01/2020    [employer]   • BRCA1 negative    • BRCA2 negative    • Breast cancer ([address]) 12/2019    left   • Diabetes mellitus ([address])    • History of radiation therapy 01/2020    left breast ca   • History of transfusion     2010   • Hypertension    • Pancreatitis     pancreatic cyst that burst   • Pulmonary embolism on left [facility])     and right side     Past Surgical History:   Procedure Laterality Date   • BREAST BIOPSY  2019   • BREAST CYST EXCISION Left 2009    benign   • BREAST LUMPECTOMY Left 02/28/2020    Procedure: BREAST NEEDLE LOCALIZED LUMPECTOMY (NEEDLE LOC AT 0900); Surgeon: Tu Yu MD;  [facility];  Service: Surgical Oncology   • COLONOSCOPY     • EYE SURGERY Bilateral     Cataract   • FISTULA REPAIR      small intestine   • ILEOSTOMY      with reversal   • LYMPH NODE BIOPSY Left 02/28/2020    Procedure: SENTINEL LYMPH NODE BIOPSY; LYMPHATIC MAPPING WITH BLUE DYE AND RADIOACTIVE DYE (INJECT AT 1100 BY DR BENOIT IN THE OR);   Surgeon: Tu Yu MD;  [facility];  Service: Surgical Oncology   • MAMMO NEEDLE LOCALIZATION LEFT (ALL INC) Left 02/28/2020   • MAMMO STEREOTACTIC BREAST BIOPSY LEFT (ALL INC) Left 1/6/2023   • TRACHEOSTOMY  2010    with repair   • US GUIDED BREAST BIOPSY LEFT COMPLETE Left 12/23/2019     Social History   Social History     Substance and Sexual Activity   Alcohol Use No     Social History     Substance and Sexual Activity   Drug Use No     Social History     Tobacco Use   Smoking Status Never   Smokeless Tobacco Never     Family History   Problem Relation Age of Onset   • Breast cancer Mother 43   • Cervical cancer Mother 58   • Breast cancer Sister 61   • No Known Problems Maternal Grandmother    • No Known Problems Paternal Grandmother    • No Known Problems Maternal Aunt    • Bone cancer Paternal Aunt 43   • No Known Problems Paternal Aunt    • Cancer Father         bladder   • Breast cancer Other        Meds/Allergies       Current Outpatient Medications:   •  acetaminophen (TYLENOL) 650 mg CR tablet  •  anastrozole (ARIMIDEX) 1 mg tablet  •  Aspirin 81 MG EC tablet  •  Calcium-Vitamin D-Vitamin K 650-12.5-40 MG-MCG-MCG CHEW  •  Cholecalciferol (VITAMIN D3 PO)  •  cyanocobalamin (VITAMIN B-12) 100 mcg tablet  •  fexofenadine (ALLEGRA) 60 MG tablet  •  FREESTYLE LITE test strip  •  hydrocortisone 0.5 % cream  •  Insulin Disposable Pump (OmniPod 5 Pack) MISC  •  losartan (COZAAR) 50 mg tablet  •  LOSARTAN POTASSIUM PO  •  Nikki 128 5 % hypertonic ophthalmic solution  •  NOVOLOG 100 UNIT/ML injection  •  Omega-3 Fatty Acids (FISH OIL) 1,000 mg  •  Polyethylene Glycol 3350 (MIRALAX PO)  •  Specialty Vitamins Products (MARTI-RX DIABETIC VITAMIN PO)  •  Cyanocobalamin (VITAMIN B 12) 100 MCG LOZG  •  Insulin Infusion Pump KIT    Allergies   Allergen Reactions   • Penicillins Hives   • Ampicillin Rash   • Sulfa Antibiotics Fatigue           Objective     Blood pressure 128/75, pulse 83, height 5' 4" (1.626 m), weight 77.6 kg (171 lb), SpO2 98 %, not currently breastfeeding. Body mass index is 29.35 kg/m². PHYSICAL EXAM:      General Appearance:   Alert, cooperative, no distress   HEENT:   Normocephalic, atraumatic, anicteric.     Neck:  Supple, symmetrical, trachea midline   Lungs:   Clear to auscultation bilaterally; no rales, rhonchi or wheezing; respirations unlabored    Heart[de-identified]   Regular rate and rhythm; no murmur, rub, or gallop.    Abdomen:   Soft, non-tender, non-distended; normal bowel sounds; no masses, no organomegaly, midline vertical scar, bandage present on the midline lower abdomen    Genitalia:   Deferred    Rectal:   Deferred    Extremities:  No cyanosis, clubbing or edema    Pulses:  2+ and symmetric    Skin:  No jaundice, rashes, or lesions  Lymph nodes:  No palpable cervical lymphadenopathy        Lab Results:   No visits with results within 1 Day(s) from this visit. Latest known visit with results is:   Hospital Outpatient Visit on 01/06/2023   Component Date Value   • Case Report 01/06/2023                      Value:Surgical Pathology Report                         Case: [id number]                                   Authorizing Provider:  Mariya Alford,   Collected:           01/06/2023 [address]                                                                         Ordering Location:     [address] [place] [address] Received:            01/06/2023 [address] [place] [address]                                                              Pathologist:           Ivett Anthony MD                                                         Specimen:    Breast, Left, Stereo bx left breast 1 o'clock. 3 cores with calcs. 8G 9CM                 • Final Diagnosis 01/06/2023                      Value: This result contains rich text formatting which cannot be displayed here. • Additional Information 01/06/2023                      Value: This result contains rich text formatting which cannot be displayed here. • Gross Description 01/06/2023                      Value: This result contains rich text formatting which cannot be displayed here. • Clinical Information 01/06/2023                      Value:Fixed in Formalin         Radiology Results:   No results found. Answers for HPI/ROS submitted by the patient on 9/11/2023  Frequency: rarely  Pain - numeric: 0/10  constipation: Yes  flatus: No  frequency: No  headaches: No  hematochezia: No  hematuria: No  melena: No  myalgias: No  nausea:  No  weight loss: No  vomiting: No  Diagnostic workup: CT scan, surgery, ultrasound

## 2023-12-04 DIAGNOSIS — Z17.0 MALIGNANT NEOPLASM OF UPPER-OUTER QUADRANT OF LEFT BREAST IN FEMALE, ESTROGEN RECEPTOR POSITIVE: ICD-10-CM

## 2023-12-04 DIAGNOSIS — C50.412 MALIGNANT NEOPLASM OF UPPER-OUTER QUADRANT OF LEFT BREAST IN FEMALE, ESTROGEN RECEPTOR POSITIVE: ICD-10-CM

## 2023-12-04 RX ORDER — ANASTROZOLE 1 MG/1
1 TABLET ORAL DAILY
Qty: 90 TABLET | Refills: 0 | Status: SHIPPED | OUTPATIENT
Start: 2023-12-04

## 2023-12-07 NOTE — TELEPHONE ENCOUNTER
Spoke with the patient and she is having issues with her incision recently. She will call us to schedule once she resolved these issues.

## 2023-12-21 ENCOUNTER — HOSPITAL ENCOUNTER (OUTPATIENT)
Dept: MAMMOGRAPHY | Facility: CLINIC | Age: 68
End: 2023-12-21
Payer: MEDICARE

## 2023-12-21 VITALS — HEIGHT: 64 IN | WEIGHT: 171 LBS | BODY MASS INDEX: 29.19 KG/M2

## 2023-12-21 DIAGNOSIS — C50.412 MALIGNANT NEOPLASM OF UPPER-OUTER QUADRANT OF LEFT BREAST IN FEMALE, ESTROGEN RECEPTOR POSITIVE: ICD-10-CM

## 2023-12-21 DIAGNOSIS — Z17.0 MALIGNANT NEOPLASM OF UPPER-OUTER QUADRANT OF LEFT BREAST IN FEMALE, ESTROGEN RECEPTOR POSITIVE: ICD-10-CM

## 2023-12-21 PROCEDURE — 77066 DX MAMMO INCL CAD BI: CPT

## 2023-12-21 PROCEDURE — G0279 TOMOSYNTHESIS, MAMMO: HCPCS

## 2024-01-10 ENCOUNTER — OFFICE VISIT (OUTPATIENT)
Dept: HEMATOLOGY ONCOLOGY | Facility: CLINIC | Age: 69
End: 2024-01-10
Payer: MEDICARE

## 2024-01-10 VITALS
HEIGHT: 64 IN | SYSTOLIC BLOOD PRESSURE: 126 MMHG | TEMPERATURE: 98.2 F | RESPIRATION RATE: 16 BRPM | OXYGEN SATURATION: 100 % | BODY MASS INDEX: 29.19 KG/M2 | HEART RATE: 82 BPM | DIASTOLIC BLOOD PRESSURE: 76 MMHG | WEIGHT: 171 LBS

## 2024-01-10 DIAGNOSIS — Z17.0 MALIGNANT NEOPLASM OF UPPER-OUTER QUADRANT OF LEFT BREAST IN FEMALE, ESTROGEN RECEPTOR POSITIVE: Primary | ICD-10-CM

## 2024-01-10 DIAGNOSIS — C50.412 MALIGNANT NEOPLASM OF UPPER-OUTER QUADRANT OF LEFT BREAST IN FEMALE, ESTROGEN RECEPTOR POSITIVE: Primary | ICD-10-CM

## 2024-01-10 DIAGNOSIS — I15.2 HYPERTENSION ASSOCIATED WITH DIABETES: ICD-10-CM

## 2024-01-10 DIAGNOSIS — E11.59 HYPERTENSION ASSOCIATED WITH DIABETES: ICD-10-CM

## 2024-01-10 PROCEDURE — 99214 OFFICE O/P EST MOD 30 MIN: CPT | Performed by: INTERNAL MEDICINE

## 2024-01-11 NOTE — PROGRESS NOTES
Hematology/Oncology Outpatient Follow- up Note  Shelley Driscoll 68 y.o. female MRN: @ Encounter: [id number]        Date:  1/11/2024        Assessment / Plan:    1 stage Ia invasive ductal carcinoma T1 cN0 M0.  ER/PA positive.  2 movement postmenopausal maintained on anastrozole approaching 3 years since beginning.      HPI: 67-year-old female with stage Ia left breast cancer % PA 90% H HER2 negative disease stage Ia.  Negative for BRCA.  Strong family history of breast cancer.  She underwent lumpectomy and sentinel lymph node biopsy biopsy resulting in no evidence of disease.  0 and subsequently has been on adjuvant normal therapy with anastrozole with minimal toxicity.  She had radiation therapy done as well.  She is doing well feeling well.  In 12/22/2019 left breast biopsy showed invasive ductal carcinoma grade 1.  She had a lumpectomy and sentinel lymph node biopsy by Dr. Ellis Hoffmann.  Lumpectomy showed 1.5 cm invasive ductal carcinoma grade 1.  There is no evidence of lymphovascular invasion.  1 sentinel lymph node negative for metastatic disease.  She is stated underwent radiation therapy as well.  She was started on anastrozole.  She is doing well tolerating well minimal hot flashes no major bone or pain noted.  Mammograms have been up-to-date are negative.      Interval History:    Doing well feeling well.      Cancer Staging:  Cancer Staging   Malignant neoplasm of upper-outer quadrant of left breast in female, estrogen receptor positive   Staging form: Breast, AJCC 8th Edition  - Pathologic: Stage IA (pT1c, pN0, cM0, G1, ER+, PA+, HER2-) - Unsigned  Histologic grading system: 3 grade system      Molecular Testing:     Previous Hematologic/ Oncologic History:    Oncology History   Malignant neoplasm of upper-outer quadrant of left breast in female, estrogen receptor positive    12/23/2019 Biopsy    Left breast ultrasound-guided biopsy  2 o'clock, 10 cm from nipple  Invasive mammary carcinoma of no  special type  Grade 1    MA 90  HER2 1+    Concordant. Appears unifocal - difficulty assessing accurate size on mammo vs US. 2.1 cm vs 1.1 cm; possible concern for adjacent mass on original US not able to be visualized on biopsy. Left axilla US negative. Right breast clear.     1/17/2020 Genetic Testing    The following genes were evaluated: ALEKSANDAR, BARD1, BRCA1, BRCA2, BRIP1, CDH1, CHEK2, NBN, NF1, PALB2, PTEN, RAD50, STK11, TP53  Negative result. No pathogenic sequence variants or deletions/dupllications identified  [facility]     2/28/2020 Surgery    Left breast needle localized lumpectomy with sentinel lymph node biopsy  Invasive carcinoma of no special type  Grade 1  1.5 cm  Margins negative  0/1 Lymph node  Anatomic/Prognostic Stage IA     4/22/2020 -  Hormone Therapy    Anastrozole 1 mg daily  Dr. Girard     5/11/2020 - 6/8/2020 Radiation    entire left breast to 4005cGy in 15 daily 267cGy fractions followed by an additional  1000cGy in 5 daily fractions to the lumpectomy cavity.  Total dose to the lumpectomy cavity was 5005cGy. Total number of fractions: 20.      Dr Davis         Current Hematologic/ Oncologic Treatment:       Cycle 1         Test Results:    Imaging: Mammo diagnostic bilateral w 3d & cad    Result Date: 12/21/2023  Narrative: DIAGNOSIS: Malignant neoplasm of upper-outer quadrant of left breast in female, estrogen receptor positive  TECHNIQUE: Digital diagnostic mammography was performed. Computer Aided Detection (CAD) analyzed all applicable images. COMPARISONS: Prior breast imaging dated: 01/06/2023, 12/20/2022, 12/15/2021, 12/14/2020, 02/28/2020, 12/23/2019, 12/19/2019, 12/12/2019, 10/11/2018, 07/07/2017, and 09/18/2015 RELEVANT HISTORY: Family Breast Cancer History: History of breast cancer in Mother, Sister, Other. Family Medical History: Family medical history includes breast cancer in 3 relatives (mother, other, sister). Personal History: Hormone history includes hormone replacement  therapy. Surgical history includes breast biopsy, lumpectomy, and breast excisional biopsy. Medical history includes breast cancer, BRCA 1 negative, and BRCA 2 negative. RISK ASSESSMENT: Tyrer-zick risk assessment reporting was suppressed due to the patient's history and/or demographic factors. TISSUE DENSITY: The breasts are heterogeneously dense, which may obscure small masses. INDICATION: Shelley Driscoll is a 68 y.o. female presenting for screening. FINDINGS: Bilateral There are no suspicious masses, grouped microcalcifications or areas of unexplained architectural distortion. The skin and nipple areolar complex are unremarkable.  Stable postop changes.     Impression:  Stable exam. ASSESSMENT/BI-RADS CATEGORY: Left: 2 - Benign Right: 2 - Benign Overall: 2 - Benign RECOMMENDATION:      - Diagnostic mammogram in 1 year for both breasts. Workstation ID: [id number]     DXA BONE DENSITY SCAN STANDARD 2 SITES    Result Date: 12/19/2023  Narrative: PROCEDURE: DXA bone mineral density examination was performed with a Hologic scanner. CLINICAL HISTORY: This is a 68 year old postmenopausal female that requires a bone density assessment. BONE DENSITY: Total Lumbar Spine: 0.792 grams/cm2 which correlates with a Z-score -0.2 and a T-score -2.3, classification of Osteopenia. Femoral Neck (Left): 0.598 grams/cm2 which correlates with a Z-score -0.4 and a T-score -2.3, classification of Osteopenia. Total Hip (Left): 0.648 grams/cm2 which correlates with a Z-score -0.9 and a T-score -2.4, classification of Osteopenia. A valid comparison for the lumbar spine and left hip cannot be made due to dissimilar scan types or analysis methods when compared to the baseline study from 11/21/2021. WHO Fracture Assessment Tool (FRAX) estimates 10 year fracture risk as follows: Major Osteoporotic Fracture: 13% Hip Fracture: 3.0% Interpretation: The patient has osteopenia as determined by WHO criteria. Based on the results of the patient's  "bone density assessment, the risk of future fracture increases approximately two fold for each 1.0 SD decrease in T-score. The 3.0% 10 year hip fracture risk as determined by the fractures evaluation indicates that pharmacologic intervention should be considered. Assessment: If drug therapy is instituted a repeat bone density assessment should be considered in one year; if not in 2 years. The National Osteoporosis Foundation Recommends That FDA Approved Medical Therapies Be Considered In Postmenopausal Women And Men Age Greater Than Or Equal To 50 Years With The Followin.  Hip Or Vertebral Fracture; 2.  T-Score Of Less Than Or Equal To -2.5 At The Spine Or Hip; 3.  Osteopenia And 10-Year Fracture Risk Probability By Frax Of Greater than Or Equal to 20% For Major Osteoporotic Fracture and Greater than Or Equal to 3% For Hip Fracture.    Comment: All Treatment Decisions, Including Pharmacologic Treatment, Require Clinical Judgment and Consideration Of Individual Patient Factors, Including Patient Preferences, Comorbidities, Previous Drug Use And Risk Factors not captured in the Frax Model, e.g. Fragility, Falls, Vitamin D Deficiency, Increased Bone turnover, and Interval Significant Decline In Bone Mineral Density. Workstation:[id number]            Labs:   Lab Results   Component Value Date    WBC 4.34 10/19/2022    HGB 11.2 (L) 10/19/2022    HCT 35.9 10/19/2022    MCV 88 10/19/2022     10/19/2022     Lab Results   Component Value Date    K 3.8 10/19/2022     10/19/2022    CO2 30 10/19/2022    BUN 15 10/19/2022    CREATININE 0.69 10/19/2022    CALCIUM 10.1 10/19/2022    CORRECTEDCA 10.9 (H) 10/19/2022    AST 12 10/19/2022    ALT 23 10/19/2022    ALKPHOS 98 10/19/2022    EGFR 89 10/19/2022         No results found for: \"SPEP\", \"UPEP\"    No results found for: \"PSA\"    No results found for: \"CEA\"    No results found for: \"\"    No results found for: \"AFP\"    No results found for: \"IRON\", \"TIBC\", " "\"FERRITIN\"    No results found for: \"RVTQJRRN33\"      ROS: Review of Systems   Constitutional: Negative.    All other systems reviewed and are negative.        Current Medications: Reviewed  Allergies: Reviewed  PMH/FH/SH:  Reviewed      Physical Exam:    Body surface area is 1.83 meters squared.    Wt Readings from Last 3 Encounters:   01/10/24 77.6 kg (171 lb)   12/21/23 77.6 kg (171 lb)   09/14/23 77.6 kg (171 lb)        Temp Readings from Last 3 Encounters:   01/10/24 98.2 °F (36.8 °C) (Temporal)   08/14/23 (!) 96.9 °F (36.1 °C)   01/31/23 (!) 96.9 °F (36.1 °C)        BP Readings from Last 3 Encounters:   01/10/24 126/76   09/14/23 128/75   08/14/23 126/74         Pulse Readings from Last 3 Encounters:   01/10/24 82   09/14/23 83   08/14/23 105     @LASTSAO2(3)@      Physical Exam  Constitutional:       Appearance: Normal appearance. She is normal weight.   HENT:      Head: Normocephalic and atraumatic.   Eyes:      Extraocular Movements: Extraocular movements intact.      Conjunctiva/sclera: Conjunctivae normal.      Pupils: Pupils are equal, round, and reactive to light.   Cardiovascular:      Rate and Rhythm: Normal rate and regular rhythm.      Heart sounds: Normal heart sounds.   Pulmonary:      Effort: Pulmonary effort is normal.      Breath sounds: Normal breath sounds.   Abdominal:      General: Abdomen is flat. Bowel sounds are normal.      Palpations: Abdomen is soft.   Musculoskeletal:         General: Normal range of motion.      Cervical back: Normal range of motion and neck supple.   Skin:     General: Skin is warm and dry.   Neurological:      General: No focal deficit present.      Mental Status: She is alert and oriented to person, place, and time. Mental status is at baseline.     No breast masses appreciated well-healed scar on left breast no axillary nodes increase      Goals and Barriers:  Current Goal: Prolong Survival from Cancer.   Barriers: None.      Patient's Capacity to Self Care:  " Patient is able to self care.    Code Status: [unfilled]  Advance Directive and Living Will:      Power of :

## 2024-01-24 NOTE — PROGRESS NOTES
Shelley Driscoll   1955    CC:  Yearly exam    A:  Yearly exam.     Problem List Items Addressed This Visit          Other    Malignant neoplasm of upper-outer quadrant of left breast in female, estrogen receptor positive      Other Visit Diagnoses       Encounter for gynecological examination (general) (routine) without abnormal findings    -  Primary    GYN exam for high-risk Medicare patient                P:   Pap testing complete. We reviewed ASCCP guidelines for Pap testing.   Breast screening per breast onc   Colon cancer screening due   DEXA with osteopenia, up to date    RTO one year for yearly exam or sooner as needed.      S:  69 y.o. female here for yearly exam. She is postmenopausal and has had no vaginal bleeding.  She denies vaginal discharge, itching, odor or dryness.     Sexual activity: She is sexually active without pain, bleeding or dryness.     STD testing: She does not want STD testing today.     Menopausal    Last Pap:  NILM/HPV -  Last Mammo:   Last Colonoscopy: 2018, 3-5yr recall  Last DEXA: 2023 osteopenia    No alcohol, drugs, smoking    Family hx of breast cancer: Mother, sister, self. BRCA neg  Family hx of ovarian cancer: denies  Family hx of colon cancer: denies      Current Outpatient Medications:     acetaminophen (TYLENOL) 650 mg CR tablet, Take 650 mg by mouth, Disp: , Rfl:     anastrozole (ARIMIDEX) 1 mg tablet, Take 1 tablet (1 mg total) by mouth daily, Disp: 90 tablet, Rfl: 0    Aspirin 81 MG EC tablet, Take by mouth, Disp: , Rfl:     Calcium-Vitamin D-Vitamin K 650-12.5-40 MG-MCG-MCG CHEW, , Disp: , Rfl:     Cholecalciferol (VITAMIN D3 PO), Take by mouth, Disp: , Rfl:     cyanocobalamin (VITAMIN B-12) 100 mcg tablet, Take by mouth daily, Disp: , Rfl:     fexofenadine (ALLEGRA) 60 MG tablet, Take by mouth, Disp: , Rfl:     FREESTYLE LITE test strip, use 1 TEST STRIP to TEST BLOOD SUGAR four times a day, Disp: , Rfl:     hydrocortisone 0.5 % cream, Apply topically  2 (two) times a day, Disp: , Rfl:     Insulin Disposable Pump (OmniPod 5 Pack) MISC, CHANGE POD EVERY 3 DAYS, Disp: , Rfl:     Insulin Lispro w/ Trans Port 100 UNIT/ML SOPN, Inject 100 Units under the skin daily, Disp: , Rfl:     losartan (COZAAR) 50 mg tablet, Take 50 mg by mouth daily, Disp: , Rfl:     LOSARTAN POTASSIUM PO, Take 50 mg by mouth, Disp: , Rfl:     Nikki 128 5 % hypertonic ophthalmic solution, INSTILL 1 DROP INTO BOTH EYES EVERY EVENING AS DIRECTED, Disp: , Rfl:     Omega-3 Fatty Acids (FISH OIL) 1,000 mg, Take 1,000 mg by mouth, Disp: , Rfl:     Polyethylene Glycol 3350 (MIRALAX PO), , Disp: , Rfl:     Specialty Vitamins Products (MARTI-RX DIABETIC VITAMIN PO), Take by mouth daily, Disp: , Rfl:     Cyanocobalamin (VITAMIN B 12) 100 MCG LOZG, Take by mouth (Patient not taking: Reported on 1/10/2024), Disp: , Rfl:     Insulin Infusion Pump KIT, by Does not apply route  (Patient not taking: Reported on 9/14/2023), Disp: , Rfl:     NOVOLOG 100 UNIT/ML injection, , Disp: , Rfl:   Social History     Socioeconomic History    Marital status: /Civil Union     Spouse name: Not on file    Number of children: Not on file    Years of education: Not on file    Highest education level: Not on file   Occupational History    Not on file   Tobacco Use    Smoking status: Never    Smokeless tobacco: Never   Vaping Use    Vaping status: Never Used   Substance and Sexual Activity    Alcohol use: No    Drug use: No    Sexual activity: Yes     Partners: Male     Birth control/protection: Post-menopausal, Female Sterilization   Other Topics Concern    Not on file   Social History Narrative    Not on file     Social Determinants of Health     Financial Resource Strain: Low Risk  (10/21/2023)    Received from [facility]    Overall Financial Resource Strain (CARDIA)     Difficulty of Paying Living Expenses: Not hard at all   Food Insecurity: No Food Insecurity (10/21/2023)    Received from [facility]  [facility]    Hunger Vital Sign     Worried About Running Out of Food in the Last Year: Never true     Ran Out of Food in the Last Year: Never true   Transportation Needs: No Transportation Needs (10/21/2023)    Received from [facility]    PRAPARE - Transportation     Lack of Transportation (Medical): No     Lack of Transportation (Non-Medical): No   Physical Activity: Not on file   Stress: Stress Concern Present (10/21/2023)    Received from [facility]    [place] [employer] - Occupational Stress Questionnaire     Feeling of Stress : Rather much   Social Connections: Moderately Integrated (10/21/2023)    Received from [facility]    Social Connection and Isolation Panel [NHANES]     Frequency of Communication with Friends and Family: Twice a week     Frequency of Social Gatherings with Friends and Family: Once a week     Attends [employer] Services: More than 4 times per year     Active Member of Clubs or Organizations: No     Attends Club or Organization Meetings: Not on file     Marital Status:    Intimate Partner Violence: Not At Risk (10/21/2023)    Received from [facility]    Humiliation, Afraid, Rape, and Kick questionnaire     Fear of Current or Ex-Partner: No     Emotionally Abused: No     Physically Abused: No     Sexually Abused: No   Housing Stability: Low Risk  (10/21/2023)    Received from [facility]    Housing Stability Vital Sign     Unable to Pay for Housing in the Last Year: No     Number of Places Lived in the Last Year: 1     Unstable Housing in the Last Year: No     Family History   Problem Relation Age of Onset    Breast cancer Mother 42    Cervical cancer Mother 62    Breast cancer Sister 63    No Known Problems Maternal Grandmother     No Known Problems Paternal Grandmother     No Known Problems Maternal Aunt     Bone cancer Paternal Aunt 42    No Known Problems Paternal  "Aunt     Cancer Father         bladder    Breast cancer Other      Past Medical History:   Diagnosis Date    BRCA gene mutation negative 01/2020    [facility]    BRCA1 negative     BRCA2 negative     Breast cancer (HCC) 12/2019    left    Diabetes mellitus (HCC)     History of radiation therapy 01/2020    left breast ca    History of transfusion     2010    Hypertension     Pancreatitis     pancreatic cyst that burst    Pulmonary embolism on left (HCC)     and right side        Review of Systems   Respiratory: Negative.    Cardiovascular: Negative.    Gastrointestinal: Negative for constipation and diarrhea.   Genitourinary: Negative for difficulty urinating, pelvic pain, vaginal bleeding, vaginal discharge, itching or odor.    O:  Blood pressure 165/100, height 5' 4\" (1.626 m), weight 77.1 kg (170 lb), not currently breastfeeding.    Patient appears well and is not in distress  Neck is supple without masses  Breasts are symmetrical without mass, tenderness, nipple discharge, skin changes or adenopathy.   Abdomen is soft and nontender without masses.   Vulva without lesions or rashes.  Urethral meatus and urethra are normal  Bladder is normal to palpation  Vagina is normal without discharge or bleeding.   Cervix is normal without discharge or lesion.   Uterus and adnexa are normal, mobile, nontender without palpable mass.  Rectovaginal exam without nodularity/masses/visible blood on glove   "

## 2024-01-25 ENCOUNTER — ANNUAL EXAM (OUTPATIENT)
Age: 69
End: 2024-01-25
Payer: MEDICARE

## 2024-01-25 VITALS
DIASTOLIC BLOOD PRESSURE: 100 MMHG | HEIGHT: 64 IN | SYSTOLIC BLOOD PRESSURE: 165 MMHG | WEIGHT: 170 LBS | BODY MASS INDEX: 29.02 KG/M2

## 2024-01-25 DIAGNOSIS — C50.412 MALIGNANT NEOPLASM OF UPPER-OUTER QUADRANT OF LEFT BREAST IN FEMALE, ESTROGEN RECEPTOR POSITIVE: ICD-10-CM

## 2024-01-25 DIAGNOSIS — Z01.419 ENCOUNTER FOR GYNECOLOGICAL EXAMINATION (GENERAL) (ROUTINE) WITHOUT ABNORMAL FINDINGS: Primary | ICD-10-CM

## 2024-01-25 DIAGNOSIS — Z17.0 MALIGNANT NEOPLASM OF UPPER-OUTER QUADRANT OF LEFT BREAST IN FEMALE, ESTROGEN RECEPTOR POSITIVE: ICD-10-CM

## 2024-01-25 DIAGNOSIS — Z91.89 GYN EXAM FOR HIGH-RISK MEDICARE PATIENT: ICD-10-CM

## 2024-01-25 PROCEDURE — G0101 CA SCREEN;PELVIC/BREAST EXAM: HCPCS | Performed by: STUDENT IN AN ORGANIZED HEALTH CARE EDUCATION/TRAINING PROGRAM

## 2024-02-19 ENCOUNTER — OFFICE VISIT (OUTPATIENT)
Dept: SURGICAL ONCOLOGY | Facility: CLINIC | Age: 69
End: 2024-02-19
Payer: MEDICARE

## 2024-02-19 VITALS
OXYGEN SATURATION: 96 % | HEART RATE: 80 BPM | TEMPERATURE: 97.3 F | HEIGHT: 64 IN | WEIGHT: 171 LBS | SYSTOLIC BLOOD PRESSURE: 128 MMHG | RESPIRATION RATE: 16 BRPM | BODY MASS INDEX: 29.19 KG/M2 | DIASTOLIC BLOOD PRESSURE: 72 MMHG

## 2024-02-19 DIAGNOSIS — Z17.0 MALIGNANT NEOPLASM OF UPPER-OUTER QUADRANT OF LEFT BREAST IN FEMALE, ESTROGEN RECEPTOR POSITIVE: Primary | ICD-10-CM

## 2024-02-19 DIAGNOSIS — Z79.811 USE OF ANASTROZOLE (ARIMIDEX): ICD-10-CM

## 2024-02-19 DIAGNOSIS — C50.412 MALIGNANT NEOPLASM OF UPPER-OUTER QUADRANT OF LEFT BREAST IN FEMALE, ESTROGEN RECEPTOR POSITIVE: Primary | ICD-10-CM

## 2024-02-19 PROCEDURE — 99214 OFFICE O/P EST MOD 30 MIN: CPT

## 2024-02-19 NOTE — PROGRESS NOTES
Surgical Oncology Follow Up       [facility] [place]  [address]  [place] [address]    Shelley Driscoll  1955  [id number]  [facility] [place]  [address]  [place] [address]    Chief Complaint   Patient presents with   • office visit       Assessment/Plan:  1. Malignant neoplasm of upper-outer quadrant of left breast in female, estrogen receptor positive   - 6 mo follow up    2. Use of anastrozole (Arimidex)  - continue use per medical oncology      Discussion/Summary: Patient is a 69-year-old female presenting today for a six-month follow-up for left breast cancer diagnosed in November 2019. Pathology revealed invasive mammary carcinoma ER/LA positive, HER2 negative. She underwent genetic testing which was negative. She had a left breast lumpectomy with sentinel node biopsy with Dr. Hoffmann and completed whole breast radiation therapy. She is currently on anastrozole. She had a b/l dx mammogram on 12/21/23 which was BIRADS 2 category 3 density. There were no concerns on her cbe. I will see the patient back in 6 months or sooner should the need arise. She was instructed to call with any questions or concerns prior to this time. All questions were answered today.        History of Present Illness:     Oncology History   Malignant neoplasm of upper-outer quadrant of left breast in female, estrogen receptor positive    12/23/2019 Biopsy    Left breast ultrasound-guided biopsy  2 o'clock, 10 cm from nipple  Invasive mammary carcinoma of no special type  Grade 1    LA 90  HER2 1+    Concordant. Appears unifocal - difficulty assessing accurate size on mammo vs US. 2.1 cm vs 1.1 cm; possible concern for adjacent mass on original US not able to be visualized on biopsy. Left axilla US negative. Right breast clear.     1/17/2020 Genetic Testing    The following genes were evaluated:  ALEKSANDAR, BARD1, BRCA1, BRCA2, BRIP1, CDH1, CHEK2, NBN, NF1, PALB2, PTEN, RAD50, STK11, TP53  Negative result. No pathogenic sequence variants or deletions/dupllications identified  [facility]     2/28/2020 Surgery    Left breast needle localized lumpectomy with sentinel lymph node biopsy  Invasive carcinoma of no special type  Grade 1  1.5 cm  Margins negative  0/1 Lymph node  Anatomic/Prognostic Stage IA     4/22/2020 -  Hormone Therapy    Anastrozole 1 mg daily  Dr. Girard     5/11/2020 - 6/8/2020 Radiation    entire left breast to 4005cGy in 15 daily 267cGy fractions followed by an additional  1000cGy in 5 daily fractions to the lumpectomy cavity.  Total dose to the lumpectomy cavity was 5005cGy. Total number of fractions: 20.      Dr Davis     1/11/2024 -  Cancer Staged    Staging form: Breast, AJCC 8th Edition  - Pathologic stage from 1/11/2024: ypT1c, pN0, cM0, ER+, IN+, HER2- - Signed by Leon Ricks MD on 1/11/2024  Histopathologic type: Infiltrating ductular carcinoma  Stage prefix: Post-therapy  Nuclear grade: G1  Multigene prognostic tests performed: MammaPrint            -Interval History: Patient is a 69-year-old female presenting today for a six-month follow-up for left breast cancer diagnosed in November 2019. She is currently on anastrozole. She had a b/l dx mammogram on 12/21/23 which was BIRADS 2 category 3 density. She has an abdominal fistula. Patient denies changes on her breast exam. She denies persistent headaches, bone pain, back pain, shortness of breath, or abdominal pain.      Review of Systems:  Review of Systems   Constitutional:  Negative for activity change, appetite change, fatigue and unexpected weight change.   Respiratory:  Negative for cough and shortness of breath.    Cardiovascular:  Negative for chest pain.   Gastrointestinal:  Negative for abdominal pain, diarrhea, nausea and vomiting.   Endocrine: Negative for heat intolerance.   Musculoskeletal:  Negative for arthralgias, back  pain and myalgias.   Skin:  Negative for rash.   Neurological:  Negative for weakness and headaches.   Hematological:  Negative for adenopathy.       Patient Active Problem List   Diagnosis   • DVT of upper extremity (deep vein thrombosis) (HCC)   • Type I diabetes mellitus, uncontrolled   • Encounter for gynecological examination without abnormal finding   • Malignant neoplasm of upper-outer quadrant of left breast in female, estrogen receptor positive    • Use of anastrozole (Arimidex)   • Hypertension associated with diabetes      Past Medical History:   Diagnosis Date   • BRCA gene mutation negative 01/2020    [facility]   • BRCA1 negative    • BRCA2 negative    • Breast cancer (HCC) 12/2019    left   • Diabetes mellitus (HCC)    • History of radiation therapy 01/2020    left breast ca   • History of transfusion     2010   • Hypertension    • Pancreatitis     pancreatic cyst that burst   • Pulmonary embolism on left (HCC)     and right side     Past Surgical History:   Procedure Laterality Date   • BREAST BIOPSY  2019   • BREAST CYST EXCISION Left 2009    benign   • BREAST LUMPECTOMY Left 02/28/2020    Procedure: BREAST NEEDLE LOCALIZED LUMPECTOMY (NEEDLE LOC AT 0900);  Surgeon: Ellis Hoffmann MD;  [facility];  Service: Surgical Oncology   • COLONOSCOPY     • EYE SURGERY Bilateral     Cataract   • FISTULA REPAIR      small intestine   • ILEOSTOMY      with reversal   • LYMPH NODE BIOPSY Left 02/28/2020    Procedure: SENTINEL LYMPH NODE BIOPSY; LYMPHATIC MAPPING WITH BLUE DYE AND RADIOACTIVE DYE (INJECT AT 1100 BY DR HOFFMANN IN THE OR);  Surgeon: Ellis Hoffmann MD;  [facility];  Service: Surgical Oncology   • MAMMO NEEDLE LOCALIZATION LEFT (ALL INC) Left 02/28/2020   • MAMMO STEREOTACTIC BREAST BIOPSY LEFT (ALL INC) Left 1/6/2023   • TRACHEOSTOMY  2010    with repair   • US GUIDED BREAST BIOPSY LEFT COMPLETE Left 12/23/2019     Family History   Problem Relation Age of Onset   • Breast cancer Mother 42   •  Cervical cancer Mother 62   • Breast cancer Sister 63   • No Known Problems Maternal Grandmother    • No Known Problems Paternal Grandmother    • No Known Problems Maternal Aunt    • Bone cancer Paternal Aunt 42   • No Known Problems Paternal Aunt    • Cancer Father         bladder   • Breast cancer Other      Social History     Socioeconomic History   • Marital status: /Civil Union     Spouse name: Not on file   • Number of children: Not on file   • Years of education: Not on file   • Highest education level: Not on file   Occupational History   • Not on file   Tobacco Use   • Smoking status: Never   • Smokeless tobacco: Never   Vaping Use   • Vaping status: Never Used   Substance and Sexual Activity   • Alcohol use: No   • Drug use: No   • Sexual activity: Yes     Partners: Male     Birth control/protection: Post-menopausal, Female Sterilization   Other Topics Concern   • Not on file   Social History Narrative   • Not on file     Social Determinants of Health     Financial Resource Strain: Low Risk  (10/21/2023)    Received from [facility]    Overall Financial Resource Strain (CARDIA)    • Difficulty of Paying Living Expenses: Not hard at all   Food Insecurity: No Food Insecurity (10/21/2023)    Received from [facility]    Hunger Vital Sign    • Worried About Running Out of Food in the Last Year: Never true    • Ran Out of Food in the Last Year: Never true   Transportation Needs: No Transportation Needs (10/21/2023)    Received from [facility]    PRAPARE - Transportation    • Lack of Transportation (Medical): No    • Lack of Transportation (Non-Medical): No   Physical Activity: Not on file   Stress: Stress Concern Present (10/21/2023)    Received from [facility]    [employer] - Occupational Stress Questionnaire    • Feeling of Stress : Rather much   Social Connections: Moderately Integrated (10/21/2023)     Received from [facility]    Social Connection and Isolation Panel [NHANES]    • Frequency of Communication with Friends and Family: Twice a week    • Frequency of Social Gatherings with Friends and Family: Once a week    • Attends Sabianism Services: More than 4 times per year    • Active Member of Clubs or Organizations: No    • Attends Club or Organization Meetings: Not on file    • Marital Status:    Intimate Partner Violence: Not At Risk (10/21/2023)    Received from [facility]    Humiliation, Afraid, Rape, and Kick questionnaire    • Fear of Current or Ex-Partner: No    • Emotionally Abused: No    • Physically Abused: No    • Sexually Abused: No   Housing Stability: Low Risk  (10/21/2023)    Received from [facility]    Housing Stability Vital Sign    • Unable to Pay for Housing in the Last Year: No    • Number of Places Lived in the Last Year: 1    • Unstable Housing in the Last Year: No       Current Outpatient Medications:   •  acetaminophen (TYLENOL) 650 mg CR tablet, Take 650 mg by mouth, Disp: , Rfl:   •  anastrozole (ARIMIDEX) 1 mg tablet, Take 1 tablet (1 mg total) by mouth daily, Disp: 90 tablet, Rfl: 0  •  Aspirin 81 MG EC tablet, Take by mouth, Disp: , Rfl:   •  Calcium-Vitamin D-Vitamin K 650-12.5-40 MG-MCG-MCG CHEW, , Disp: , Rfl:   •  Cholecalciferol (VITAMIN D3 PO), Take by mouth, Disp: , Rfl:   •  cyanocobalamin (VITAMIN B-12) 100 mcg tablet, Take by mouth daily, Disp: , Rfl:   •  fexofenadine (ALLEGRA) 60 MG tablet, Take by mouth, Disp: , Rfl:   •  FREESTYLE LITE test strip, use 1 TEST STRIP to TEST BLOOD SUGAR four times a day, Disp: , Rfl:   •  hydrocortisone 0.5 % cream, Apply topically 2 (two) times a day, Disp: , Rfl:   •  Insulin Disposable Pump (OmniPod 5 Pack) MISC, CHANGE POD EVERY 3 DAYS, Disp: , Rfl:   •  Insulin Infusion Pump KIT, Use, Disp: , Rfl:   •  Insulin Lispro w/ Trans Port 100 UNIT/ML SOPN, Inject 100 Units under  the skin daily, Disp: , Rfl:   •  losartan (COZAAR) 50 mg tablet, Take 50 mg by mouth daily, Disp: , Rfl:   •  LOSARTAN POTASSIUM PO, Take 50 mg by mouth, Disp: , Rfl:   •  Nikki 128 5 % hypertonic ophthalmic solution, INSTILL 1 DROP INTO BOTH EYES EVERY EVENING AS DIRECTED, Disp: , Rfl:   •  Omega-3 Fatty Acids (FISH OIL) 1,000 mg, Take 1,000 mg by mouth, Disp: , Rfl:   •  Polyethylene Glycol 3350 (MIRALAX PO), , Disp: , Rfl:   •  Specialty Vitamins Products (MARTI-RX DIABETIC VITAMIN PO), Take by mouth daily, Disp: , Rfl:   •  Cyanocobalamin (VITAMIN B 12) 100 MCG LOZG, Take by mouth (Patient not taking: Reported on 1/10/2024), Disp: , Rfl:   •  NOVOLOG 100 UNIT/ML injection, , Disp: , Rfl:   Allergies   Allergen Reactions   • Penicillins Hives   • Ampicillin Rash   • Sulfa Antibiotics Fatigue     Vitals:    02/19/24 1057   BP: 128/72   Pulse: 80   Resp: 16   Temp: (!) 97.3 °F (36.3 °C)   SpO2: 96%       Physical Exam  Constitutional:       General: She is not in acute distress.     Appearance: Normal appearance.   Cardiovascular:      Rate and Rhythm: Normal rate and regular rhythm.      Pulses: Normal pulses.      Heart sounds: Normal heart sounds.   Pulmonary:      Effort: Pulmonary effort is normal.      Breath sounds: Normal breath sounds.   Chest:      Chest wall: No mass.   Breasts:     Right: No swelling, bleeding, inverted nipple, mass, nipple discharge, skin change or tenderness.      Left: No swelling, bleeding, inverted nipple, mass, nipple discharge, skin change or tenderness.       Abdominal:      General: Abdomen is flat.      Palpations: Abdomen is soft.   Lymphadenopathy:      Upper Body:      Right upper body: No supraclavicular, axillary or pectoral adenopathy.      Left upper body: No supraclavicular, axillary or pectoral adenopathy.   Skin:     General: Skin is warm.   Neurological:      General: No focal deficit present.      Mental Status: She is alert and oriented to person, place, and time.    Psychiatric:         Mood and Affect: Mood normal.         Behavior: Behavior normal.           Results:    Imaging  No results found.    I reviewed the above imaging data.      Advance Care Planning/Advance Directives:  Discussed disease status, cancer treatment plans and/or cancer treatment goals with the patient.

## 2024-02-21 PROBLEM — Z01.419 ENCOUNTER FOR GYNECOLOGICAL EXAMINATION WITHOUT ABNORMAL FINDING: Status: RESOLVED | Noted: 2018-06-06 | Resolved: 2024-02-21

## 2024-02-27 DIAGNOSIS — C50.412 MALIGNANT NEOPLASM OF UPPER-OUTER QUADRANT OF LEFT BREAST IN FEMALE, ESTROGEN RECEPTOR POSITIVE: ICD-10-CM

## 2024-02-27 DIAGNOSIS — Z17.0 MALIGNANT NEOPLASM OF UPPER-OUTER QUADRANT OF LEFT BREAST IN FEMALE, ESTROGEN RECEPTOR POSITIVE: ICD-10-CM

## 2024-02-27 RX ORDER — ANASTROZOLE 1 MG/1
TABLET ORAL
Qty: 90 TABLET | Refills: 2 | Status: SHIPPED | OUTPATIENT
Start: 2024-02-27

## 2024-08-19 ENCOUNTER — OFFICE VISIT (OUTPATIENT)
Dept: SURGICAL ONCOLOGY | Facility: CLINIC | Age: 69
End: 2024-08-19
Payer: MEDICARE

## 2024-08-19 VITALS
RESPIRATION RATE: 16 BRPM | BODY MASS INDEX: 29.62 KG/M2 | HEART RATE: 80 BPM | DIASTOLIC BLOOD PRESSURE: 86 MMHG | SYSTOLIC BLOOD PRESSURE: 138 MMHG | TEMPERATURE: 96.9 F | WEIGHT: 173.5 LBS | HEIGHT: 64 IN | OXYGEN SATURATION: 96 %

## 2024-08-19 DIAGNOSIS — Z79.811 USE OF ANASTROZOLE (ARIMIDEX): ICD-10-CM

## 2024-08-19 DIAGNOSIS — Z17.0 MALIGNANT NEOPLASM OF UPPER-OUTER QUADRANT OF LEFT BREAST IN FEMALE, ESTROGEN RECEPTOR POSITIVE (HCC): Primary | ICD-10-CM

## 2024-08-19 DIAGNOSIS — C50.412 MALIGNANT NEOPLASM OF UPPER-OUTER QUADRANT OF LEFT BREAST IN FEMALE, ESTROGEN RECEPTOR POSITIVE (HCC): Primary | ICD-10-CM

## 2024-08-19 PROCEDURE — 99214 OFFICE O/P EST MOD 30 MIN: CPT

## 2024-08-19 NOTE — PROGRESS NOTES
Surgical Oncology Follow Up       [facility] [place]  [address]  [place] [address]    Shelley M Americo  1955  [id number]  [facility] [place]  [address]  [place] [address]    Chief Complaint   Patient presents with   • office visit       Assessment/Plan:  1. Malignant neoplasm of upper-outer quadrant of left breast in female, estrogen receptor positive (HCC)  - 6 mo f/u    2. Use of anastrozole (Arimidex)  - continue use per medical oncology       Discussion/Summary: Patient is a 69-year-old female presenting today for a six-month follow-up for left breast cancer diagnosed in November 2019. Pathology revealed invasive mammary carcinoma ER/AK positive, HER2 negative. She underwent genetic testing which was negative. She had a left breast lumpectomy with sentinel node biopsy with Dr. Hoffmann and completed whole breast radiation therapy. She is currently on anastrozole. She is scheduled for her mammogram on 12/26/24. There were no concerns on her cbe. She has some skin irritation in the left inframammary fold from the heat. I will see the patient back in 6 months or sooner should the need arise. She was instructed to call with any questions or concerns prior to this time. All questions were answered today.     History of Present Illness:     Oncology History   Malignant neoplasm of upper-outer quadrant of left breast in female, estrogen receptor positive (HCC)   12/23/2019 Biopsy    Left breast ultrasound-guided biopsy  2 o'clock, 10 cm from nipple  Invasive mammary carcinoma of no special type  Grade 1    AK 90  HER2 1+    Concordant. Appears unifocal - difficulty assessing accurate size on mammo vs US. 2.1 cm vs 1.1 cm; possible concern for adjacent mass on original US not able to be visualized on biopsy. Left axilla US negative. Right breast clear.     1/17/2020 Genetic  Testing    The following genes were evaluated: ALEKSANDAR, BARD1, BRCA1, BRCA2, BRIP1, CDH1, CHEK2, NBN, NF1, PALB2, PTEN, RAD50, STK11, TP53  Negative result. No pathogenic sequence variants or deletions/dupllications identified  [facility]     2/28/2020 Surgery    Left breast needle localized lumpectomy with sentinel lymph node biopsy  Invasive carcinoma of no special type  Grade 1  1.5 cm  Margins negative  0/1 Lymph node  Anatomic/Prognostic Stage IA     4/22/2020 -  Hormone Therapy    Anastrozole 1 mg daily  Dr. Girard     5/11/2020 - 6/8/2020 Radiation    entire left breast to 4005cGy in 15 daily 267cGy fractions followed by an additional  1000cGy in 5 daily fractions to the lumpectomy cavity.  Total dose to the lumpectomy cavity was 5005cGy. Total number of fractions: 20.      Dr Davis     1/11/2024 -  Cancer Staged    Staging form: Breast, AJCC 8th Edition  - Pathologic stage from 1/11/2024: ypT1c, pN0, cM0, ER+, MA+, HER2- - Signed by Leon Ricks MD on 1/11/2024  Histopathologic type: Infiltrating ductular carcinoma  Stage prefix: Post-therapy  Nuclear grade: G1  Multigene prognostic tests performed: MammaPrint            -Interval History: Patient is a 69-year-old female presenting today for a six-month follow-up for left breast cancer diagnosed in November 2019.  She is currently on anastrozole. She is scheduled for her mammogram on 12/26/24. She denies persistent headaches, bone pain, back pain, shortness of breath, or abdominal pain.     Review of Systems:  Review of Systems   Constitutional:  Negative for activity change, appetite change, fatigue and unexpected weight change.   Respiratory:  Negative for cough and shortness of breath.    Cardiovascular:  Negative for chest pain.   Gastrointestinal:  Negative for abdominal pain, diarrhea, nausea and vomiting.   Endocrine: Negative for heat intolerance.   Musculoskeletal:  Negative for arthralgias, back pain and myalgias.   Skin:  Negative for rash.    Neurological:  Negative for weakness and headaches.   Hematological:  Negative for adenopathy.       Patient Active Problem List   Diagnosis   • DVT of upper extremity (deep vein thrombosis) (HCC)   • Type I diabetes mellitus, uncontrolled   • Malignant neoplasm of upper-outer quadrant of left breast in female, estrogen receptor positive (HCC)   • Use of anastrozole (Arimidex)   • Hypertension associated with diabetes  (HCC)     Past Medical History:   Diagnosis Date   • BRCA gene mutation negative 01/2020    [facility]   • BRCA1 negative    • BRCA2 negative    • Breast cancer (HCC) 12/2019    left   • Diabetes mellitus (HCC)    • History of radiation therapy 01/2020    left breast ca   • History of transfusion     2010   • Hypertension    • Pancreatitis     pancreatic cyst that burst   • Pulmonary embolism on left (HCC)     and right side     Past Surgical History:   Procedure Laterality Date   • BREAST BIOPSY  2019   • BREAST CYST EXCISION Left 2009    benign   • BREAST LUMPECTOMY Left 02/28/2020    Procedure: BREAST NEEDLE LOCALIZED LUMPECTOMY (NEEDLE LOC AT 0900);  Surgeon: Ellis Hoffmann MD;  [facility];  Service: Surgical Oncology   • COLONOSCOPY     • EYE SURGERY Bilateral     Cataract   • FISTULA REPAIR      small intestine   • ILEOSTOMY      with reversal   • LYMPH NODE BIOPSY Left 02/28/2020    Procedure: SENTINEL LYMPH NODE BIOPSY; LYMPHATIC MAPPING WITH BLUE DYE AND RADIOACTIVE DYE (INJECT AT 1100 BY DR HOFFMANN IN THE OR);  Surgeon: Ellis Hoffmann MD;  [facility];  Service: Surgical Oncology   • MAMMO NEEDLE LOCALIZATION LEFT (ALL INC) Left 02/28/2020   • MAMMO STEREOTACTIC BREAST BIOPSY LEFT (ALL INC) Left 1/6/2023   • TRACHEOSTOMY  2010    with repair   • US GUIDED BREAST BIOPSY LEFT COMPLETE Left 12/23/2019     Family History   Problem Relation Age of Onset   • Breast cancer Mother 42   • Cervical cancer Mother 62   • Breast cancer Sister 63   • No Known Problems Maternal Grandmother    • No  Known Problems Paternal Grandmother    • No Known Problems Maternal Aunt    • Bone cancer Paternal Aunt 42   • No Known Problems Paternal Aunt    • Cancer Father         bladder   • Breast cancer Other      Social History     Socioeconomic History   • Marital status: /Civil Union     Spouse name: Not on file   • Number of children: Not on file   • Years of education: Not on file   • Highest education level: Not on file   Occupational History   • Not on file   Tobacco Use   • Smoking status: Never   • Smokeless tobacco: Never   Vaping Use   • Vaping status: Never Used   Substance and Sexual Activity   • Alcohol use: No   • Drug use: No   • Sexual activity: Yes     Partners: Male     Birth control/protection: Post-menopausal, Female Sterilization   Other Topics Concern   • Not on file   Social History Narrative   • Not on file     Social Determinants of Health     Financial Resource Strain: Low Risk  (10/21/2023)    [facility]    Overall Financial Resource Strain (CARDIA)    • Difficulty of Paying Living Expenses: Not hard at all   Food Insecurity: No Food Insecurity (10/21/2023)    [facility]    Hunger Vital Sign    • Worried About Running Out of Food in the Last Year: Never true    • Ran Out of Food in the Last Year: Never true   Transportation Needs: No Transportation Needs (10/21/2023)    [facility]    PRAPARE - Transportation    • Lack of Transportation (Medical): No    • Lack of Transportation (Non-Medical): No   Physical Activity: Not on file   Stress: Stress Concern Present (10/21/2023)    [facility]    [employer] - Occupational Stress Questionnaire    • Feeling of Stress : Rather much   Social Connections: Unknown (6/18/2024)     Received from [facility]    • How often do you feel lonely or isolated from those around you? (Adult - for ages 18 years and over): Not on file   Intimate Partner Violence: Not At Risk (10/21/2023)    [facility]    Humiliation, Afraid, Rape, and Kick questionnaire    • Fear of Current or Ex-Partner: No    • Emotionally Abused: No    • Physically Abused: No    • Sexually Abused: No   Housing Stability: Low Risk  (10/21/2023)    [facility]    Housing Stability Vital Sign    • Unable to Pay for Housing in the Last Year: No    • Number of Places Lived in the Last Year: 1    • Unstable Housing in the Last Year: No       Current Outpatient Medications:   •  acetaminophen (TYLENOL) 650 mg CR tablet, Take 650 mg by mouth, Disp: , Rfl:   •  anastrozole (ARIMIDEX) 1 mg tablet, TAKE 1 TABLET(1 MG) BY MOUTH DAILY, Disp: 90 tablet, Rfl: 2  •  Aspirin 81 MG EC tablet, Take by mouth, Disp: , Rfl:   •  Calcium-Vitamin D-Vitamin K 650-12.5-40 MG-MCG-MCG CHEW, , Disp: , Rfl:   •  Cholecalciferol (VITAMIN D3 PO), Take by mouth, Disp: , Rfl:   •  cyanocobalamin (VITAMIN B-12) 100 mcg tablet, Take by mouth daily, Disp: , Rfl:   •  fexofenadine (ALLEGRA) 60 MG tablet, Take by mouth, Disp: , Rfl:   •  hydrocortisone 0.5 % cream, Apply topically 2 (two) times a day, Disp: , Rfl:   •  Insulin Disposable Pump (OmniPod 5 Pack) MISC, CHANGE POD EVERY 3 DAYS, Disp: , Rfl:   •  Insulin Infusion Pump KIT, Use, Disp: , Rfl:   •  Insulin Lispro w/ Trans Port 100 UNIT/ML SOPN, Inject 100 Units under the skin daily, Disp: , Rfl:   •  losartan (COZAAR) 50 mg tablet, Take 50 mg by mouth daily, Disp: , Rfl:   •  Nikki 128 5 % hypertonic ophthalmic solution, INSTILL 1 DROP INTO BOTH EYES EVERY EVENING AS DIRECTED, Disp: , Rfl:   •  Omega-3 Fatty Acids (FISH OIL) 1,000 mg, Take 1,000 mg by mouth, Disp: , Rfl:   •   Polyethylene Glycol 3350 (MIRALAX PO), , Disp: , Rfl:   •  Specialty Vitamins Products (MARTI-RX DIABETIC VITAMIN PO), Take by mouth daily, Disp: , Rfl:   •  Cyanocobalamin (VITAMIN B 12) 100 MCG LOZG, Take by mouth (Patient not taking: Reported on 1/10/2024), Disp: , Rfl:   •  FREESTYLE LITE test strip, use 1 TEST STRIP to TEST BLOOD SUGAR four times a day, Disp: , Rfl:   •  LOSARTAN POTASSIUM PO, Take 50 mg by mouth (Patient not taking: Reported on 8/19/2024), Disp: , Rfl:   •  NOVOLOG 100 UNIT/ML injection, , Disp: , Rfl:   Allergies   Allergen Reactions   • Penicillins Hives   • Ampicillin Rash   • Sulfa Antibiotics Fatigue     Vitals:    08/19/24 1126   BP: 138/86   Pulse: 80   Resp: 16   Temp: (!) 96.9 °F (36.1 °C)   SpO2: 96%       Physical Exam  Constitutional:       General: She is not in acute distress.     Appearance: Normal appearance.   Cardiovascular:      Rate and Rhythm: Normal rate and regular rhythm.      Pulses: Normal pulses.      Heart sounds: Normal heart sounds.   Pulmonary:      Effort: Pulmonary effort is normal.      Breath sounds: Normal breath sounds.   Chest:      Chest wall: No mass.   Breasts:     Right: No swelling, bleeding, inverted nipple, mass, nipple discharge, skin change or tenderness.      Left: No swelling, bleeding, inverted nipple, mass, nipple discharge, skin change or tenderness.       Abdominal:      General: Abdomen is flat.      Palpations: Abdomen is soft.   Lymphadenopathy:      Upper Body:      Right upper body: No supraclavicular, axillary or pectoral adenopathy.      Left upper body: No supraclavicular, axillary or pectoral adenopathy.   Skin:     General: Skin is warm.   Neurological:      General: No focal deficit present.      Mental Status: She is alert and oriented to person, place, and time.   Psychiatric:         Mood and Affect: Mood normal.         Behavior: Behavior normal.           Results:    Imaging  No results found.    I reviewed the above imaging  data.      Advance Care Planning/Advance Directives:  Discussed disease status, cancer treatment plans and/or cancer treatment goals with the patient.

## 2024-12-04 DIAGNOSIS — Z17.0 MALIGNANT NEOPLASM OF UPPER-OUTER QUADRANT OF LEFT BREAST IN FEMALE, ESTROGEN RECEPTOR POSITIVE (HCC): ICD-10-CM

## 2024-12-04 DIAGNOSIS — C50.412 MALIGNANT NEOPLASM OF UPPER-OUTER QUADRANT OF LEFT BREAST IN FEMALE, ESTROGEN RECEPTOR POSITIVE (HCC): ICD-10-CM

## 2024-12-04 NOTE — TELEPHONE ENCOUNTER
Medication: Anastrozole (Arimidex)    Dose/Frequency: 1 mg/Daily    Quantity: 90    Pharmacy: [facility]    Office:   [] PCP/Provider -   [x] Speciality/Provider - Dr. Crowe    Does the patient have enough for 3 days?   [] Yes   [] No - Send as HP to POD  Unsure

## 2024-12-05 RX ORDER — ANASTROZOLE 1 MG/1
1 TABLET ORAL DAILY
Qty: 90 TABLET | Refills: 1 | Status: SHIPPED | OUTPATIENT
Start: 2024-12-05

## 2024-12-24 ENCOUNTER — TELEPHONE (OUTPATIENT)
Dept: HEMATOLOGY ONCOLOGY | Facility: CLINIC | Age: 69
End: 2024-12-24

## 2024-12-26 ENCOUNTER — HOSPITAL ENCOUNTER (OUTPATIENT)
Dept: MAMMOGRAPHY | Facility: CLINIC | Age: 69
End: 2024-12-26
Payer: MEDICARE

## 2024-12-26 DIAGNOSIS — Z17.0 ESTROGEN RECEPTOR POSITIVE: ICD-10-CM

## 2024-12-26 DIAGNOSIS — C50.412 MALIGNANT NEOPLASM OF UPPER-OUTER QUADRANT OF LEFT FEMALE BREAST, UNSPECIFIED ESTROGEN RECEPTOR STATUS (HCC): ICD-10-CM

## 2024-12-26 PROCEDURE — 77066 DX MAMMO INCL CAD BI: CPT

## 2024-12-26 PROCEDURE — G0279 TOMOSYNTHESIS, MAMMO: HCPCS

## 2025-01-06 ENCOUNTER — HOSPITAL ENCOUNTER (EMERGENCY)
Facility: HOSPITAL | Age: 70
Discharge: HOME | End: 2025-01-06
Attending: EMERGENCY MEDICINE | Admitting: EMERGENCY MEDICINE
Payer: COMMERCIAL

## 2025-01-06 ENCOUNTER — TELEPHONE (OUTPATIENT)
Age: 70
End: 2025-01-06

## 2025-01-06 ENCOUNTER — APPOINTMENT (EMERGENCY)
Dept: CT IMAGING | Facility: HOSPITAL | Age: 70
End: 2025-01-06
Payer: COMMERCIAL

## 2025-01-06 ENCOUNTER — OFFICE VISIT (OUTPATIENT)
Dept: SURGICAL ONCOLOGY | Facility: CLINIC | Age: 70
End: 2025-01-06
Payer: MEDICARE

## 2025-01-06 VITALS
OXYGEN SATURATION: 99 % | RESPIRATION RATE: 16 BRPM | HEART RATE: 90 BPM | SYSTOLIC BLOOD PRESSURE: 180 MMHG | WEIGHT: 174 LBS | HEIGHT: 64 IN | TEMPERATURE: 96.9 F | BODY MASS INDEX: 29.71 KG/M2 | DIASTOLIC BLOOD PRESSURE: 90 MMHG

## 2025-01-06 VITALS
SYSTOLIC BLOOD PRESSURE: 195 MMHG | HEART RATE: 100 BPM | DIASTOLIC BLOOD PRESSURE: 98 MMHG | TEMPERATURE: 97.8 F | RESPIRATION RATE: 18 BRPM | OXYGEN SATURATION: 97 %

## 2025-01-06 DIAGNOSIS — N61.0 BREAST INFECTION: Primary | ICD-10-CM

## 2025-01-06 DIAGNOSIS — N61.0 CELLULITIS OF LEFT BREAST: Primary | ICD-10-CM

## 2025-01-06 DIAGNOSIS — Z17.0 MALIGNANT NEOPLASM OF UPPER-OUTER QUADRANT OF LEFT BREAST IN FEMALE, ESTROGEN RECEPTOR POSITIVE (HCC): ICD-10-CM

## 2025-01-06 DIAGNOSIS — C50.412 MALIGNANT NEOPLASM OF UPPER-OUTER QUADRANT OF LEFT BREAST IN FEMALE, ESTROGEN RECEPTOR POSITIVE (HCC): ICD-10-CM

## 2025-01-06 LAB
ALBUMIN SERPL BCG-MCNC: 4.3 G/DL (ref 3.5–5)
ALP SERPL-CCNC: 90 U/L (ref 34–104)
ALT SERPL W P-5'-P-CCNC: 16 U/L (ref 7–52)
ANION GAP SERPL CALCULATED.3IONS-SCNC: 7 MMOL/L (ref 4–13)
AST SERPL W P-5'-P-CCNC: 17 U/L (ref 13–39)
BACTERIA UR QL AUTO: ABNORMAL /HPF
BASOPHILS # BLD AUTO: 0.02 THOUSANDS/ΜL (ref 0–0.1)
BASOPHILS NFR BLD AUTO: 0 % (ref 0–1)
BILIRUB SERPL-MCNC: 0.43 MG/DL (ref 0.2–1)
BILIRUB UR QL STRIP: NEGATIVE
BUN SERPL-MCNC: 10 MG/DL (ref 5–25)
CALCIUM SERPL-MCNC: 10.6 MG/DL (ref 8.4–10.2)
CAOX CRY URNS QL MICRO: ABNORMAL /HPF
CHLORIDE SERPL-SCNC: 106 MMOL/L (ref 96–108)
CLARITY UR: ABNORMAL
CO2 SERPL-SCNC: 28 MMOL/L (ref 21–32)
COLOR UR: YELLOW
CREAT SERPL-MCNC: 0.46 MG/DL (ref 0.6–1.3)
EOSINOPHIL # BLD AUTO: 0.02 THOUSAND/ΜL (ref 0–0.61)
EOSINOPHIL NFR BLD AUTO: 0 % (ref 0–6)
ERYTHROCYTE [DISTWIDTH] IN BLOOD BY AUTOMATED COUNT: 12.6 % (ref 11.6–15.1)
GFR SERPL CREATININE-BSD FRML MDRD: 100 ML/MIN/1.73SQ M
GLUCOSE SERPL-MCNC: 120 MG/DL (ref 65–140)
GLUCOSE UR STRIP-MCNC: NEGATIVE MG/DL
HCT VFR BLD AUTO: 40.5 % (ref 34.8–46.1)
HGB BLD-MCNC: 13.4 G/DL (ref 11.5–15.4)
HGB UR QL STRIP.AUTO: ABNORMAL
HYALINE CASTS #/AREA URNS LPF: ABNORMAL /LPF
IMM GRANULOCYTES # BLD AUTO: 0.01 THOUSAND/UL (ref 0–0.2)
IMM GRANULOCYTES NFR BLD AUTO: 0 % (ref 0–2)
KETONES UR STRIP-MCNC: ABNORMAL MG/DL
LACTATE SERPL-SCNC: 0.7 MMOL/L (ref 0.5–2)
LEUKOCYTE ESTERASE UR QL STRIP: NEGATIVE
LYMPHOCYTES # BLD AUTO: 1.42 THOUSANDS/ΜL (ref 0.6–4.47)
LYMPHOCYTES NFR BLD AUTO: 26 % (ref 14–44)
MCH RBC QN AUTO: 29 PG (ref 26.8–34.3)
MCHC RBC AUTO-ENTMCNC: 33.1 G/DL (ref 31.4–37.4)
MCV RBC AUTO: 88 FL (ref 82–98)
MONOCYTES # BLD AUTO: 0.4 THOUSAND/ΜL (ref 0.17–1.22)
MONOCYTES NFR BLD AUTO: 7 % (ref 4–12)
MUCOUS THREADS UR QL AUTO: ABNORMAL
NEUTROPHILS # BLD AUTO: 3.5 THOUSANDS/ΜL (ref 1.85–7.62)
NEUTS SEG NFR BLD AUTO: 67 % (ref 43–75)
NITRITE UR QL STRIP: NEGATIVE
NON-SQ EPI CELLS URNS QL MICRO: ABNORMAL /HPF
NRBC BLD AUTO-RTO: 0 /100 WBCS
PH UR STRIP.AUTO: 5 [PH]
PLATELET # BLD AUTO: 138 THOUSANDS/UL (ref 149–390)
PMV BLD AUTO: 9.6 FL (ref 8.9–12.7)
POTASSIUM SERPL-SCNC: 3.6 MMOL/L (ref 3.5–5.3)
PROCALCITONIN SERPL-MCNC: 0.06 NG/ML
PROT SERPL-MCNC: 7.1 G/DL (ref 6.4–8.4)
PROT UR STRIP-MCNC: NEGATIVE MG/DL
RBC # BLD AUTO: 4.62 MILLION/UL (ref 3.81–5.12)
RBC #/AREA URNS AUTO: ABNORMAL /HPF
SODIUM SERPL-SCNC: 141 MMOL/L (ref 135–147)
SP GR UR STRIP.AUTO: 1.02 (ref 1–1.03)
UROBILINOGEN UR STRIP-ACNC: <2 MG/DL
WBC # BLD AUTO: 5.37 THOUSAND/UL (ref 4.31–10.16)
WBC #/AREA URNS AUTO: ABNORMAL /HPF

## 2025-01-06 PROCEDURE — 36415 COLL VENOUS BLD VENIPUNCTURE: CPT | Performed by: EMERGENCY MEDICINE

## 2025-01-06 PROCEDURE — 99284 EMERGENCY DEPT VISIT MOD MDM: CPT

## 2025-01-06 PROCEDURE — 85025 COMPLETE CBC W/AUTO DIFF WBC: CPT | Performed by: EMERGENCY MEDICINE

## 2025-01-06 PROCEDURE — 71260 CT THORAX DX C+: CPT

## 2025-01-06 PROCEDURE — 96366 THER/PROPH/DIAG IV INF ADDON: CPT

## 2025-01-06 PROCEDURE — 84145 PROCALCITONIN (PCT): CPT | Performed by: EMERGENCY MEDICINE

## 2025-01-06 PROCEDURE — 81001 URINALYSIS AUTO W/SCOPE: CPT | Performed by: EMERGENCY MEDICINE

## 2025-01-06 PROCEDURE — 99213 OFFICE O/P EST LOW 20 MIN: CPT

## 2025-01-06 PROCEDURE — 87040 BLOOD CULTURE FOR BACTERIA: CPT | Performed by: EMERGENCY MEDICINE

## 2025-01-06 PROCEDURE — 96365 THER/PROPH/DIAG IV INF INIT: CPT

## 2025-01-06 PROCEDURE — 80053 COMPREHEN METABOLIC PANEL: CPT | Performed by: EMERGENCY MEDICINE

## 2025-01-06 PROCEDURE — 99285 EMERGENCY DEPT VISIT HI MDM: CPT

## 2025-01-06 PROCEDURE — 83605 ASSAY OF LACTIC ACID: CPT | Performed by: EMERGENCY MEDICINE

## 2025-01-06 RX ORDER — ONDANSETRON 4 MG/1
4 TABLET, ORALLY DISINTEGRATING ORAL EVERY 6 HOURS PRN
Qty: 20 TABLET | Refills: 0 | Status: SHIPPED | OUTPATIENT
Start: 2025-01-06

## 2025-01-06 RX ORDER — INSULIN LISPRO 100 [IU]/ML
INJECTION, SOLUTION INTRAVENOUS; SUBCUTANEOUS
COMMUNITY
Start: 2024-10-28

## 2025-01-06 RX ORDER — CEPHALEXIN 500 MG/1
500 CAPSULE ORAL EVERY 6 HOURS SCHEDULED
Qty: 28 CAPSULE | Refills: 0 | Status: SHIPPED | OUTPATIENT
Start: 2025-01-06 | End: 2025-01-13

## 2025-01-06 RX ORDER — DOXYCYCLINE 100 MG/1
100 CAPSULE ORAL 2 TIMES DAILY
Qty: 14 CAPSULE | Refills: 0 | Status: SHIPPED | OUTPATIENT
Start: 2025-01-06 | End: 2025-01-13

## 2025-01-06 RX ADMIN — IOHEXOL 85 ML: 350 INJECTION, SOLUTION INTRAVENOUS at 16:47

## 2025-01-06 RX ADMIN — CEFTRIAXONE SODIUM 1000 MG: 10 INJECTION, POWDER, FOR SOLUTION INTRAVENOUS at 15:53
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
## 2025-01-06 NOTE — DISCHARGE INSTRUCTIONS
Keflex and Doxycycline- take with food  Follow up with oncologist  Return to ER if symptoms worsen

## 2025-01-06 NOTE — TELEPHONE ENCOUNTER
FYI:  Pt calling and also sent a mychart message and will be sending photos for further assessment.    Pt stated yesterday woke up and did not feel great and noticed her L breast ONLY was red blotchy sore and felt warm and itchy  She took benadryl,Motrin and use ice with some relief.    Then stated this morning its the same and the nipple is sensitive     Had Mammo on 12/26/24. Last OV seen was 8/19 and has f/u appt sched 2/17/25.  Pt would like to be seen-Appt was given to pt for today at [place] with Katie at 1030.  Pls see attached photo in her mychart for further review.    Pls advise if any further information needed to pt.

## 2025-01-06 NOTE — ED PROVIDER NOTES
Time reflects when diagnosis was documented in both MDM as applicable and the Disposition within this note       Time User Action Codes Description Comment    1/6/2025  5:37 PM Cindy Rojo Add [N61.0] Cellulitis of left breast           ED Disposition       ED Disposition   Discharge    Condition   Stable    Date/Time   Mon Jan 6, 2025  5:37 PM    Comment   Shelley Driscoll discharge to home/self care.                   Assessment & Plan       Medical Decision Making  This patient presents with initial presentation of local erythema, warmth, swelling concerning for cellulitis.  Sensitivity/pain to light touch around the erythematous area.  No lymphatic spread visible and no fluid pockets or fluctuance concerning for abscess noted.  Low concern for osteomyelitis or DVT.  No immune compromise, bullae or pain out of proportion concerning for necrotizing fasciitis. CT chest with contrast resulted: Skin thickening over the left breast consistent with cellulitis.  No noted abscess. Patient to be discharged home with antibiotics and to follow-up with primary care provider/oncologist.  Patient stated that she is unable to take sulfa medications and penicillins.  Patient was given Keflex and doxycycline.  Return to the ER symptoms worsens or questions or concerns arise at home.        Amount and/or Complexity of Data Reviewed  Radiology: ordered. Decision-making details documented in ED Course.    Risk  Prescription drug management.        ED Course as of 01/06/25 1800   Mon Jan 06, 2025   1727 CT chest with contrast  Interval development of skin thickening over the left breast consistent with cellulitis. No underlying abscess.     Surgical clip in the left outer breast associated with scar in keeping with provided history of breast cancer.     Fatty liver and small hiatal hernia.            Medications   ceftriaxone (ROCEPHIN) 1 g/50 mL in dextrose IVPB (1,000 mg Intravenous New Bag 1/6/25 7909)   iohexol (OMNIPAQUE) 350  MG/ML injection (MULTI-DOSE) 85 mL (85 mL Intravenous Given 1/6/25 7629)       ED Risk Strat Scores                          SBIRT 20yo+      Flowsheet Row Most Recent Value   Initial Alcohol Screen: US AUDIT-C     1. How often do you have a drink containing alcohol? 0 Filed at: 01/06/2025 1244   2. How many drinks containing alcohol do you have on a typical day you are drinking?  0 Filed at: 01/06/2025 1244   3b. FEMALE Any Age, or MALE 65+: How often do you have 4 or more drinks on one occassion? 0 Filed at: 01/06/2025 1244   Audit-C Score 0 Filed at: 01/06/2025 1244   HYUN: How many times in the past year have you...    Used an illegal drug or used a prescription medication for non-medical reasons? Never Filed at: 01/06/2025 1244                            History of Present Illness       Chief Complaint   Patient presents with    Breast Problem     Pt c/o redness all over L breast that she noticed yesterday. Pt states it is warm to touch with some hives but denies pain. Went to [facility] but was sent here to [place] to see if there is any fluid/infection. Hx breast cancer.        Past Medical History:   Diagnosis Date    BRCA gene mutation negative 01/2020    [facility]    BRCA1 negative     BRCA2 negative     Breast cancer (HCC) 12/2019    left    Diabetes mellitus (HCC)     History of radiation therapy 01/2020    left breast ca    History of transfusion     2010    Hypertension     Pancreatitis     pancreatic cyst that burst    Pulmonary embolism on left (HCC)     and right side      Past Surgical History:   Procedure Laterality Date    BREAST BIOPSY  2019    BREAST CYST EXCISION Left 2009    benign    BREAST LUMPECTOMY Left 02/28/2020    Procedure: BREAST NEEDLE LOCALIZED LUMPECTOMY (NEEDLE LOC AT 0900);  Surgeon: Ellis Hoffmann MD;  [facility];  Service: Surgical Oncology    COLONOSCOPY      EYE SURGERY Bilateral     Cataract    FISTULA REPAIR      small intestine    ILEOSTOMY      with reversal  "   LYMPH NODE BIOPSY Left 02/28/2020    Procedure: SENTINEL LYMPH NODE BIOPSY; LYMPHATIC MAPPING WITH BLUE DYE AND RADIOACTIVE DYE (INJECT AT 1100 BY DR HOFFMANN IN THE OR);  Surgeon: Ellis Hoffmann MD;  Location[facility];  Service: Surgical Oncology    MAMMO NEEDLE LOCALIZATION LEFT (ALL INC) Left 02/28/2020    MAMMO STEREOTACTIC BREAST BIOPSY LEFT (ALL INC) Left 1/6/2023    TRACHEOSTOMY  2010    with repair    US GUIDED BREAST BIOPSY LEFT COMPLETE Left 12/23/2019      Family History   Problem Relation Age of Onset    Breast cancer Mother 42    Cervical cancer Mother 62    Breast cancer Sister 63    No Known Problems Maternal Grandmother     No Known Problems Paternal Grandmother     No Known Problems Maternal Aunt     Bone cancer Paternal Aunt 42    No Known Problems Paternal Aunt     Cancer Father         bladder    Breast cancer Other       Social History     Tobacco Use    Smoking status: Never    Smokeless tobacco: Never   Vaping Use    Vaping status: Never Used   Substance Use Topics    Alcohol use: No    Drug use: No      E-Cigarette/Vaping    E-Cigarette Use Never User       E-Cigarette/Vaping Substances    Nicotine No     THC No     CBD No     Flavoring No     Other No     Unknown No       I have reviewed and agree with the history as documented.     70 y/o female patient presents to the ER for evaluation of breast pain. Patient presents from surgical oncologist office for further evaluation.       Per chart review:  \"Patient is a 69 year old female presenting for erythema and soreness of the left breast. She sent a message via [employer] yesterday stating \"my whole left breast is splotchy and pink. It is warm and slightly swollen. I took benadryl, ibuprofen and put ice on breast. It looks like a tiny bug bite in the middle, up next to the breast. It itches at the spot under breast, but no real pain.\" Patient states that she woke up yesterday and her left breast was bright red. She has been afebrile but states " "she has felt warm and achey. Temp yesterday was 98.9. She is afebrile today. . She is hypertensive 180/90, but states she was stressed out driving in the snow storm. On exam, the left breast is diffusely red but she denies pain or tenderness on palpation. I used a marker to outline extent of erythema. There is an area of excoriated skin at the inframammary fold of the inner left breast that patient states is itchy. It appears she has a blistered area of skin at the 7 o'clock position 5 cm FN of the left breast. Patient mentions pruritus in this area but no nerve pain. There are no vesicular lesions or signs of abscess. There is a photo of the left breast in media. I had this evaluated by Dr. Cardarelli. Due to severity of erythema and quick onset, the patient was advised to go to the [place] ED to be evaluated and treated. Message was sent to breast surgeon Dr. Pa who is located in at the [facility]. I also sent a message to Dr. Ashli Thomas who is the on-call provider of infectious disease at [place] to make sure she is aware that the patient is going to the ED. I will f/u with the patients ED course and get her scheduled for a f/u visit once discharged. All questions were answered today. \"          Review of Systems   Constitutional:  Negative for chills and fever.   HENT:  Negative for ear pain and sore throat.    Eyes:  Negative for pain and visual disturbance.   Respiratory:  Negative for cough and shortness of breath.    Cardiovascular:  Negative for chest pain and palpitations.   Gastrointestinal:  Negative for abdominal pain and vomiting.   Genitourinary:  Negative for dysuria and hematuria.   Musculoskeletal:  Negative for arthralgias and back pain.   Skin:  Positive for rash. Negative for color change.   Neurological:  Negative for seizures and syncope.   All other systems reviewed and are negative.          Objective       ED Triage Vitals [01/06/25 1241]   Temperature Pulse Blood Pressure " Respirations SpO2 Patient Position - Orthostatic VS   97.8 °F (36.6 °C) 104 (!) 239/112 19 99 % Sitting      Temp Source Heart Rate Source BP Location FiO2 (%) Pain Score    Temporal Monitor Right arm -- --      Vitals      Date and Time Temp Pulse SpO2 Resp BP Pain Score FACES Pain Rating User   01/06/25 1557 -- 100 97 % 18 195/98 -- -- RJ   01/06/25 1241 97.8 °F (36.6 °C) 104 99 % 19 239/112 -- -- KW            Physical Exam  Vitals and nursing note reviewed.   Constitutional:       General: She is not in acute distress.     Appearance: She is well-developed.   HENT:      Head: Normocephalic and atraumatic.   Eyes:      Conjunctiva/sclera: Conjunctivae normal.   Cardiovascular:      Rate and Rhythm: Normal rate and regular rhythm.      Pulses: Normal pulses.      Heart sounds: Normal heart sounds.   Pulmonary:      Effort: Pulmonary effort is normal. No respiratory distress.      Breath sounds: Normal breath sounds.   Chest:   Breasts:     Left: Skin change and tenderness present.   Abdominal:      Palpations: Abdomen is soft.   Musculoskeletal:         General: No swelling.      Cervical back: Neck supple.   Skin:     General: Skin is warm and dry.      Capillary Refill: Capillary refill takes less than 2 seconds.   Neurological:      Mental Status: She is alert and oriented to person, place, and time. Mental status is at baseline.   Psychiatric:         Mood and Affect: Mood normal.         Behavior: Behavior normal.         Results Reviewed       Procedure Component Value Units Date/Time    Procalcitonin [[id number]]  (Normal) Collected: 01/06/25 1434    Lab Status: Final result Specimen: Blood from Arm, Left Updated: 01/06/25 1730     Procalcitonin 0.06 ng/ml     Urine Microscopic [[id number]]  (Abnormal) Collected: 01/06/25 1637    Lab Status: Final result Specimen: Urine, Clean Catch Updated: 01/06/25 1721     RBC, UA 10-20 /hpf      WBC, UA 2-4 /hpf      Epithelial Cells Occasional /hpf      Bacteria, UA None  Seen /hpf      MUCUS THREADS Occasional     Hyaline Casts, UA 0-3 /lpf      Ca Oxalate Farzana, UA Moderate /hpf     UA (URINE) with reflex to Scope [[id number]]  (Abnormal) Collected: 01/06/25 1637    Lab Status: Final result Specimen: Urine, Clean Catch Updated: 01/06/25 1702     Color, UA Yellow     Clarity, UA Turbid     Specific Gravity, UA 1.018     pH, UA 5.0     Leukocytes, UA Negative     Nitrite, UA Negative     Protein, UA Negative mg/dl      Glucose, UA Negative mg/dl      Ketones, UA 20 (1+) mg/dl      Urobilinogen, UA <2.0 mg/dl      Bilirubin, UA Negative     Occult Blood, UA Small    Lactic acid, plasma (w/reflex if result > 2.0) [[id number]]  (Normal) Collected: 01/06/25 1603    Lab Status: Final result Specimen: Blood Updated: 01/06/25 1614     LACTIC ACID 0.7 mmol/L     Narrative:      Result may be elevated if tourniquet was used during collection.    CBC and differential [[id number]]  (Abnormal) Collected: 01/06/25 1434    Lab Status: Final result Specimen: Blood from Arm, Right Updated: 01/06/25 1513     WBC 5.37 Thousand/uL      RBC 4.62 Million/uL      Hemoglobin 13.4 g/dL      Hematocrit 40.5 %      MCV 88 fL      MCH 29.0 pg      MCHC 33.1 g/dL      RDW 12.6 %      MPV 9.6 fL      Platelets 138 Thousands/uL      nRBC 0 /100 WBCs      Segmented % 67 %      Immature Grans % 0 %      Lymphocytes % 26 %      Monocytes % 7 %      Eosinophils Relative 0 %      Basophils Relative 0 %      Absolute Neutrophils 3.50 Thousands/µL      Absolute Immature Grans 0.01 Thousand/uL      Absolute Lymphocytes 1.42 Thousands/µL      Absolute Monocytes 0.40 Thousand/µL      Eosinophils Absolute 0.02 Thousand/µL      Basophils Absolute 0.02 Thousands/µL     Comprehensive metabolic panel [[id number]]  (Abnormal) Collected: 01/06/25 1434    Lab Status: Final result Specimen: Blood from Arm, Left Updated: 01/06/25 1502     Sodium 141 mmol/L      Potassium 3.6 mmol/L      Chloride 106 mmol/L      CO2 28 mmol/L      ANION  GAP 7 mmol/L      BUN 10 mg/dL      Creatinine 0.46 mg/dL      Glucose 120 mg/dL      Calcium 10.6 mg/dL      AST 17 U/L      ALT 16 U/L      Alkaline Phosphatase 90 U/L      Total Protein 7.1 g/dL      Albumin 4.3 g/dL      Total Bilirubin 0.43 mg/dL      eGFR 100 ml/min/1.73sq m     Narrative:      National Kidney Disease Foundation guidelines for Chronic Kidney Disease (CKD):     Stage 1 with normal or high GFR (GFR > 90 mL/min/1.73 square meters)    Stage 2 Mild CKD (GFR = 60-89 mL/min/1.73 square meters)    Stage 3A Moderate CKD (GFR = 45-59 mL/min/1.73 square meters)    Stage 3B Moderate CKD (GFR = 30-44 mL/min/1.73 square meters)    Stage 4 Severe CKD (GFR = 15-29 mL/min/1.73 square meters)    Stage 5 End Stage CKD (GFR <15 mL/min/1.73 square meters)  Note: GFR calculation is accurate only with a steady state creatinine    Blood culture #1 [[id number]] Collected: 01/06/25 1434    Lab Status: In process Specimen: Blood from Arm, Left Updated: 01/06/25 1443    Blood culture #2 [[id number]] Collected: 01/06/25 1434    Lab Status: In process Specimen: Blood from Arm, Right Updated: 01/06/25 1442            CT chest with contrast   Final Interpretation by Laz Bose MD (01/06 1722)      Interval development of skin thickening over the left breast consistent with cellulitis. No underlying abscess.      Surgical clip in the left outer breast associated with scar in keeping with provided history of breast cancer.      Fatty liver and small hiatal hernia.         Workstation performed: [id number]             Procedures    ED Medication and Procedure Management   Prior to Admission Medications   Prescriptions Last Dose Informant Patient Reported? Taking?   Aspirin 81 MG EC tablet  Self Yes No   Sig: Take by mouth   Calcium-Vitamin D-Vitamin K 650-12.5-40 MG-MCG-MCG CHEW  Self Yes No   Cholecalciferol (VITAMIN D3 PO)  Self Yes No   Sig: Take by mouth   Cyanocobalamin (VITAMIN B 12) 100 MCG LOZG  Self Yes No   Sig:  Take by mouth   FREESTYLE LITE test strip  Self Yes No   Sig: use 1 TEST STRIP to TEST BLOOD SUGAR four times a day   Patient not taking: Reported on 1/6/2025   HumaLOG 100 UNIT/ML injection   Yes No   Sig: INJECT 100 UNITS UNDER THE SKIN VIA INSULIN PUMP.  UNITS   Insulin Disposable Pump (OmniPod 5 Pack) MISC  Self Yes No   Sig: CHANGE POD EVERY 3 DAYS   Insulin Infusion Pump KIT  Self Yes No   Sig: Use   Insulin Lispro w/ Trans Port 100 UNIT/ML SOPN  Self Yes No   Sig: Inject 100 Units under the skin daily   LOSARTAN POTASSIUM PO  Self Yes No   Sig: Take 50 mg by mouth   Patient not taking: Reported on 8/19/2024   Nikki 128 5 % hypertonic ophthalmic solution  Self Yes No   Sig: INSTILL 1 DROP INTO BOTH EYES EVERY EVENING AS DIRECTED   NOVOLOG 100 UNIT/ML injection  Self Yes No   Patient not taking: Reported on 1/10/2024   Omega-3 Fatty Acids (FISH OIL) 1,000 mg  Self Yes No   Sig: Take 1,000 mg by mouth   Polyethylene Glycol 3350 (MIRALAX PO)  Self Yes No   Specialty Vitamins Products (MARTI-RX DIABETIC VITAMIN PO)  Self Yes No   Sig: Take by mouth daily   acetaminophen (TYLENOL) 650 mg CR tablet  Self Yes No   Sig: Take 650 mg by mouth   anastrozole (ARIMIDEX) 1 mg tablet   No No   Sig: Take 1 tablet (1 mg total) by mouth daily   cyanocobalamin (VITAMIN B-12) 100 mcg tablet  Self Yes No   Sig: Take by mouth daily   Patient not taking: Reported on 1/6/2025   fexofenadine (ALLEGRA) 60 MG tablet  Self Yes No   Sig: Take by mouth   glucose blood test strip   Yes No   Sig: Use 1 each 4 (four) times a day OTC test strips   hydrocortisone 0.5 % cream  Self Yes No   Sig: Apply topically 2 (two) times a day   losartan (COZAAR) 50 mg tablet  Self Yes No   Sig: Take 50 mg by mouth daily      Facility-Administered Medications: None     Patient's Medications   Discharge Prescriptions    CEPHALEXIN (KEFLEX) 500 MG CAPSULE    Take 1 capsule (500 mg total) by mouth every 6 (six) hours for 7 days       Start Date: 1/6/2025   End Date: 1/13/2025       Order Dose: 500 mg       Quantity: 28 capsule    Refills: 0    DOXYCYCLINE HYCLATE (VIBRAMYCIN) 100 MG CAPSULE    Take 1 capsule (100 mg total) by mouth 2 (two) times a day for 7 days       Start Date: 1/6/2025  End Date: 1/13/2025       Order Dose: 100 mg       Quantity: 14 capsule    Refills: 0    ONDANSETRON (ZOFRAN-ODT) 4 MG DISINTEGRATING TABLET    Take 1 tablet (4 mg total) by mouth every 6 (six) hours as needed for nausea or vomiting       Start Date: 1/6/2025  End Date: --       Order Dose: 4 mg       Quantity: 20 tablet    Refills: 0     No discharge procedures on file.  ED SEPSIS DOCUMENTATION   Time reflects when diagnosis was documented in both MDM as applicable and the Disposition within this note       Time User Action Codes Description Comment    1/6/2025  5:37 PM Cindy Rojo Add [N61.0] Cellulitis of left breast                  SELIN Fontenot  01/06/25 1800

## 2025-01-07 ENCOUNTER — TELEPHONE (OUTPATIENT)
Dept: SURGICAL ONCOLOGY | Facility: CLINIC | Age: 70
End: 2025-01-07

## 2025-01-07 NOTE — TELEPHONE ENCOUNTER
Received a phone call from patient.  Patient returning a voicemail message from Katie.  Patient can be reached at #[phone].

## 2025-01-07 NOTE — TELEPHONE ENCOUNTER
F.u with patient regarding left breast cellulitis. HTN in ED was not addressed by anyone. I recommend she take her BP at home over the next 2 days and f/u with myself with the readings. I will get her scheduled for a f.u appt with myself on 1/15 to re-assess cellulitis.

## 2025-01-07 NOTE — TELEPHONE ENCOUNTER
LVM for patient regarding ED course for left breast cellulitis. Request she return phone call to discuss f/u questions and management

## 2025-01-11 LAB
BACTERIA BLD CULT: NORMAL
BACTERIA BLD CULT: NORMAL

## 2025-01-14 ENCOUNTER — TELEPHONE (OUTPATIENT)
Age: 70
End: 2025-01-14

## 2025-01-14 NOTE — TELEPHONE ENCOUNTER
Spoke to patient regarding breast cellulitis and shingles development. She states that the redness on the breast is much improved. To be on the safe side we will cancel tomorrows appt and plan to f/u with her at her routine visit in February. I encouraged her to send my a [employer] message if redness comes back. She was agreeable. I will cancel tomorrows appt at this time.

## 2025-01-14 NOTE — TELEPHONE ENCOUNTER
TRISHI:  Pt returning call from Katie PENA today about her shingles and her sched appt geoff.    Per pt she never had shingles per pt per the PCP but he was treating her with Vacyclovir as a precaution. Last dose is geoff.Pt has f/u appt with PCP geoff afternoon as well.  Pt rash never developed into anything and nothing crusted over per pt.    Pls advise if pt keeping her appt geoff 1/15 with Katie PENA sched for 10AM Pt would need to know by 9AM geoff to leave her home.

## 2025-02-17 ENCOUNTER — OFFICE VISIT (OUTPATIENT)
Dept: SURGICAL ONCOLOGY | Facility: CLINIC | Age: 70
End: 2025-02-17
Payer: COMMERCIAL

## 2025-02-17 VITALS
OXYGEN SATURATION: 97 % | HEIGHT: 64 IN | SYSTOLIC BLOOD PRESSURE: 170 MMHG | BODY MASS INDEX: 29.71 KG/M2 | TEMPERATURE: 98 F | DIASTOLIC BLOOD PRESSURE: 92 MMHG | HEART RATE: 90 BPM | WEIGHT: 174 LBS

## 2025-02-17 DIAGNOSIS — C50.412 MALIGNANT NEOPLASM OF UPPER-OUTER QUADRANT OF LEFT BREAST IN FEMALE, ESTROGEN RECEPTOR POSITIVE (HCC): ICD-10-CM

## 2025-02-17 DIAGNOSIS — Z17.0 MALIGNANT NEOPLASM OF UPPER-OUTER QUADRANT OF LEFT BREAST IN FEMALE, ESTROGEN RECEPTOR POSITIVE (HCC): ICD-10-CM

## 2025-02-17 DIAGNOSIS — Z79.811 USE OF ANASTROZOLE (ARIMIDEX): ICD-10-CM

## 2025-02-17 DIAGNOSIS — Z08 ENCOUNTER FOR FOLLOW-UP EXAMINATION AFTER COMPLETED TREATMENT FOR MALIGNANT NEOPLASM: Primary | ICD-10-CM

## 2025-02-17 PROCEDURE — 99214 OFFICE O/P EST MOD 30 MIN: CPT

## 2025-02-17 RX ORDER — CLOTRIMAZOLE AND BETAMETHASONE DIPROPIONATE 10; .64 MG/G; MG/G
CREAM TOPICAL 2 TIMES DAILY
COMMUNITY
Start: 2025-01-29 | End: 2026-01-29

## 2025-02-17 RX ORDER — SERTRALINE HYDROCHLORIDE 25 MG/1
25 TABLET, FILM COATED ORAL DAILY
COMMUNITY
Start: 2025-01-29 | End: 2025-04-29

## 2025-02-17 NOTE — PROGRESS NOTES
Name: Shelley Driscoll      : 1955      MRN: [id number]  Encounter Provider: SELIN Rolle  Encounter Date: 2025   Encounter department: [facility]  :  Assessment & Plan  Encounter for follow-up examination after completed treatment for malignant neoplasm  - hem/onc 3/20/25  - 1 year f/u       Malignant neoplasm of upper-outer quadrant of left breast in female, estrogen receptor positive (HCC)  Patient is a 70-year-old female presenting today for a follow-up for left breast cancer diagnosed in 2019. Pathology revealed invasive mammary carcinoma ER/SD positive, HER2 negative. She underwent genetic testing which was negative. She had a left breast lumpectomy with sentinel node biopsy with Dr. Hoffmann and completed whole breast radiation therapy. She is currently on anastrozole. She will see med/onc next month to discuss possible discontinuation. She had a b/l dx mammo on 24 which was BIRADS 2 category 3 density. Screening mammo is scheduled for this year. Earlier this year she was sent to the ED for left breast cellulitis and then was treated for shingles on the left breast/chest wall. She mentions a lot of stress and anxiety that was increasing blood pressure. She is now on anxiety medication and feeling a lot better mentally. Today there were no concerns on her cbe. Patient denies changes on her breast exam. She denies persistent headaches, bone pain, back pain, shortness of breath, or abdominal pain. I will see the patient back in 1 year or sooner should the need arise. She was instructed to call with any questions or concerns prior to this time. All questions were answered today.        Use of anastrozole (Arimidex)  - continue use per medical oncology           Oncology History   Cancer Staging   Malignant neoplasm of upper-outer quadrant of left breast in female, estrogen receptor positive (HCC)  Staging form: Breast, AJCC 8th  Edition  - Pathologic stage from 1/11/2024: ypT1c, pN0, cM0, ER+, MD+, HER2- - Signed by Leon Ricks MD on 1/11/2024  Histopathologic type: Infiltrating ductular carcinoma  Stage prefix: Post-therapy  Nuclear grade: G1  Multigene prognostic tests performed: MammaPrint  Oncology History   Malignant neoplasm of upper-outer quadrant of left breast in female, estrogen receptor positive (HCC)   12/23/2019 Biopsy    Left breast ultrasound-guided biopsy  2 o'clock, 10 cm from nipple  Invasive mammary carcinoma of no special type  Grade 1    MD 90  HER2 1+    Concordant. Appears unifocal - difficulty assessing accurate size on mammo vs US. 2.1 cm vs 1.1 cm; possible concern for adjacent mass on original US not able to be visualized on biopsy. Left axilla US negative. Right breast clear.     1/17/2020 Genetic Testing    The following genes were evaluated: ALEKSANDAR, BARD1, BRCA1, BRCA2, BRIP1, CDH1, CHEK2, NBN, NF1, PALB2, PTEN, RAD50, STK11, TP53  Negative result. No pathogenic sequence variants or deletions/dupllications identified  [employer]     2/28/2020 Surgery    Left breast needle localized lumpectomy with sentinel lymph node biopsy  Invasive carcinoma of no special type  Grade 1  1.5 cm  Margins negative  0/1 Lymph node  Anatomic/Prognostic Stage IA     4/22/2020 -  Hormone Therapy    Anastrozole 1 mg daily  Dr. Girard     5/11/2020 - 6/8/2020 Radiation    entire left breast to 4005cGy in 15 daily 267cGy fractions followed by an additional  1000cGy in 5 daily fractions to the lumpectomy cavity.  Total dose to the lumpectomy cavity was 5005cGy. Total number of fractions: 20.      Dr Davis     1/11/2024 -  Cancer Staged    Staging form: Breast, AJCC 8th Edition  - Pathologic stage from 1/11/2024: ypT1c, pN0, cM0, ER+, MD+, HER2- - Signed by Leon Ricks MD on 1/11/2024  Histopathologic type: Infiltrating ductular carcinoma  Stage prefix: Post-therapy  Nuclear grade: G1  Multigene prognostic tests performed:  "MammaPrint          Review of Systems   Constitutional:  Negative for activity change, appetite change, fatigue and unexpected weight change.   Respiratory:  Negative for cough and shortness of breath.    Cardiovascular:  Negative for chest pain.   Gastrointestinal:  Negative for abdominal pain, diarrhea, nausea and vomiting.   Endocrine: Negative for heat intolerance.   Musculoskeletal:  Negative for arthralgias, back pain and myalgias.   Skin:  Negative for rash.   Neurological:  Negative for weakness and headaches.   Hematological:  Negative for adenopathy.    A complete review of systems is negative other than that noted above in the HPI.       Objective   /92   Pulse 90   Temp 98 °F (36.7 °C)   Ht 5' 4\" (1.626 m)   Wt 78.9 kg (174 lb)   LMP  (LMP Unknown)   SpO2 97%   BMI 29.87 kg/m²     Pain Screening:  Pain Score: 0-No pain  ECOG    Physical Exam  Constitutional:       General: She is not in acute distress.     Appearance: Normal appearance.   Cardiovascular:      Rate and Rhythm: Normal rate and regular rhythm.      Pulses: Normal pulses.      Heart sounds: Normal heart sounds.   Pulmonary:      Effort: Pulmonary effort is normal.      Breath sounds: Normal breath sounds.   Chest:      Chest wall: No mass.   Breasts:     Right: No swelling, bleeding, inverted nipple, mass, nipple discharge, skin change or tenderness.      Left: No swelling, bleeding, inverted nipple, mass, nipple discharge, skin change or tenderness.   Abdominal:      General: Abdomen is flat.      Palpations: Abdomen is soft.   Lymphadenopathy:      Upper Body:      Right upper body: No supraclavicular, axillary or pectoral adenopathy.      Left upper body: No supraclavicular, axillary or pectoral adenopathy.   Skin:     General: Skin is warm.   Neurological:      General: No focal deficit present.      Mental Status: She is alert and oriented to person, place, and time.   Psychiatric:         Mood and Affect: Mood normal.    "      Behavior: Behavior normal.          Labs: I have reviewed pertinent labs.   No visits with results within 1 Month(s) from this visit.   Latest known visit with results is:   Admission on 01/06/2025, Discharged on 01/06/2025   Component Date Value Ref Range Status   • WBC 01/06/2025 5.37  4.31 - 10.16 Thousand/uL Final   • RBC 01/06/2025 4.62  3.81 - 5.12 Million/uL Final   • Hemoglobin 01/06/2025 13.4  11.5 - 15.4 g/dL Final   • Hematocrit 01/06/2025 40.5  34.8 - 46.1 % Final   • MCV 01/06/2025 88  82 - 98 fL Final   • MCH 01/06/2025 29.0  26.8 - 34.3 pg Final   • MCHC 01/06/2025 33.1  31.4 - 37.4 g/dL Final   • RDW 01/06/2025 12.6  11.6 - 15.1 % Final   • MPV 01/06/2025 9.6  8.9 - 12.7 fL Final   • Platelets 01/06/2025 138 (L)  149 - 390 Thousands/uL Final    Manual Review of Smear Performed   • nRBC 01/06/2025 0  /100 WBCs Final   • Segmented % 01/06/2025 67  43 - 75 % Final   • Immature Grans % 01/06/2025 0  0 - 2 % Final   • Lymphocytes % 01/06/2025 26  14 - 44 % Final   • Monocytes % 01/06/2025 7  4 - 12 % Final   • Eosinophils Relative 01/06/2025 0  0 - 6 % Final   • Basophils Relative 01/06/2025 0  0 - 1 % Final   • Absolute Neutrophils 01/06/2025 3.50  1.85 - 7.62 Thousands/µL Final   • Absolute Immature Grans 01/06/2025 0.01  0.00 - 0.20 Thousand/uL Final   • Absolute Lymphocytes 01/06/2025 1.42  0.60 - 4.47 Thousands/µL Final   • Absolute Monocytes 01/06/2025 0.40  0.17 - 1.22 Thousand/µL Final   • Eosinophils Absolute 01/06/2025 0.02  0.00 - 0.61 Thousand/µL Final   • Basophils Absolute 01/06/2025 0.02  0.00 - 0.10 Thousands/µL Final   • Sodium 01/06/2025 141  135 - 147 mmol/L Final   • Potassium 01/06/2025 3.6  3.5 - 5.3 mmol/L Final   • Chloride 01/06/2025 106  96 - 108 mmol/L Final   • CO2 01/06/2025 28  21 - 32 mmol/L Final   • ANION GAP 01/06/2025 7  4 - 13 mmol/L Final   • BUN 01/06/2025 10  5 - 25 mg/dL Final   • Creatinine 01/06/2025 0.46 (L)  0.60 - 1.30 mg/dL Final    Standardized to IDMS  reference method   • Glucose 01/06/2025 120  65 - 140 mg/dL Final    If the patient is fasting, the ADA then defines impaired fasting glucose as > 100 mg/dL and diabetes as > or equal to 123 mg/dL.   • Calcium 01/06/2025 10.6 (H)  8.4 - 10.2 mg/dL Final   • AST 01/06/2025 17  13 - 39 U/L Final   • ALT 01/06/2025 16  7 - 52 U/L Final    Specimen collection should occur prior to Sulfasalazine administration due to the potential for falsely depressed results.    • Alkaline Phosphatase 01/06/2025 90  34 - 104 U/L Final   • Total Protein 01/06/2025 7.1  6.4 - 8.4 g/dL Final   • Albumin 01/06/2025 4.3  3.5 - 5.0 g/dL Final   • Total Bilirubin 01/06/2025 0.43  0.20 - 1.00 mg/dL Final    Use of this assay is not recommended for patients undergoing treatment with eltrombopag due to the potential for falsely elevated results.  N-acetyl-p-benzoquinone imine (metabolite of Acetaminophen) will generate erroneously low results in samples for patients that have taken an overdose of Acetaminophen.   • eGFR 01/06/2025 100  ml/min/1.73sq m Final   • LACTIC ACID 01/06/2025 0.7  0.5 - 2.0 mmol/L Final   • Procalcitonin 01/06/2025 0.06  <=0.25 ng/ml Final    Comment: Suspected Lower Respiratory Tract Infection (LRTI):  - LESS than or EQUAL to 0.25 ng/mL:   low likelihood for bacterial LRTI; antibiotics DISCOURAGED.  - GREATER than 0.25 ng/mL:   increased likelihood for bacterial LRTI; antibiotics ENCOURAGED.    Suspected Sepsis:  - Strongly consider initiating antibiotics in ALL UNSTABLE patients.  - LESS than or EQUAL to 0.5 ng/mL:   low likelihood for bacterial sepsis; antibiotics DISCOURAGED.  - GREATER than 0.5 ng/mL:   increased likelihood for bacterial sepsis; antibiotics ENCOURAGED.  - GREATER than 2 ng/mL:   high risk for severe sepsis / septic shock; antibiotics strongly ENCOURAGED.    Decisions on antibiotic use should not be based solely on Procalcitonin (PCT) levels. If PCT is low but uncertainty exists with stopping  antibiotics, repeat PCT in 6-24 hours to confirm the low level. If antibiotics are administered (regardless if initial PCT was high or low), repeat PCT every 1-2 days to consider early antibiotic cessation (when GREATER                            than 80% decrease from the peak OR when PCT drops below designated cutoffs, whichever comes first), so long as the infection is NOT one that typically requires prolonged treatment durations (e.g., bone/joint infections, endocarditis, Staph. aureus bacteremia).    Situations of FALSE-POSITIVE Procalcitonin values:  1) Newborns < 72 hours old  2) Massive stress from severe trauma / burns, major surgery, acute pancreatitis, cardiogenic / hemorrhagic shock, sickle cell crisis, or other organ perfusion abnormalities  3) Malaria and some Candidal infections  4) Treatment with agents that stimulate cytokines (e.g., OKT3, anti-lymphocyte globulins, alemtuzumab, IL-2, granulocyte transfusion [NOT GCSFs])  5) Chronic renal disease causes elevated baseline levels (consider GREATER than 0.75 ng/mL as an abnormal cut-off); initiating HD/CRRT may cause transient decreases  6) Paraneoplastic syndromes from medullary thyroid or SCLC, some forms of vasculitis, and acute [id number]                            disease    Situations of FALSE-NEGATIVE Procalcitonin values:  1) Too early in clinical course for PCT to have reached its peak (may repeat in 6-24 hours to confirm low level)  2) Localized infection WITHOUT systemic (SIRS / sepsis) response (e.g., an abscess, osteomyelitis, cystitis)  3) Mycobacteria (e.g., Tuberculosis, MAC)  4) Cystic fibrosis exacerbations     • Blood Culture 01/06/2025 No Growth After 5 Days.   Final   • Blood Culture 01/06/2025 No Growth After 5 Days.   Final   • Color, UA 01/06/2025 Yellow   Final   • Clarity,  01/06/2025 Turbid   Final   • Specific Gravity,  01/06/2025 1.018  1.003 - 1.030 Final   • pH,  01/06/2025 5.0  4.5, 5.0, 5.5, 6.0, 6.5, 7.0, 7.5,  8.0 Final   • Leukocytes, UA 01/06/2025 Negative  Negative Final   • Nitrite, UA 01/06/2025 Negative  Negative Final   • Protein, UA 01/06/2025 Negative  Negative mg/dl Final   • Glucose, UA 01/06/2025 Negative  Negative mg/dl Final   • Ketones, UA 01/06/2025 20 (1+) (A)  Negative mg/dl Final   • Urobilinogen, UA 01/06/2025 <2.0  <2.0 mg/dl mg/dl Final   • Bilirubin, UA 01/06/2025 Negative  Negative Final   • Occult Blood, UA 01/06/2025 Small (A)  Negative Final   • RBC, UA 01/06/2025 10-20 (A)  None Seen, 1-2 /hpf Final   • WBC, UA 01/06/2025 2-4 (A)  None Seen, 1-2 /hpf Final   • Epithelial Cells 01/06/2025 Occasional  None Seen, Occasional /hpf Final   • Bacteria, UA 01/06/2025 None Seen  None Seen, Occasional /hpf Final   • MUCUS THREADS 01/06/2025 Occasional (A)  None Seen Final   • Hyaline Casts, UA 01/06/2025 0-3 (A)  None Seen /lpf Final   • Ca Oxalate Farzana, UA 01/06/2025 Moderate (A)  None Seen /hpf Final

## 2025-02-17 NOTE — ASSESSMENT & PLAN NOTE
Patient is a 70-year-old female presenting today for a follow-up for left breast cancer diagnosed in November 2019. Pathology revealed invasive mammary carcinoma ER/HI positive, HER2 negative. She underwent genetic testing which was negative. She had a left breast lumpectomy with sentinel node biopsy with Dr. Hoffmann and completed whole breast radiation therapy. She is currently on anastrozole. She will see med/onc next month to discuss possible discontinuation. She had a b/l dx mammo on 12/26/24 which was BIRADS 2 category 3 density. Screening mammo is scheduled for this year. Earlier this year she was sent to the ED for left breast cellulitis and then was treated for shingles on the left breast/chest wall. She mentions a lot of stress and anxiety that was increasing blood pressure. She is now on anxiety medication and feeling a lot better mentally. Today there were no concerns on her cbe. Patient denies changes on her breast exam. She denies persistent headaches, bone pain, back pain, shortness of breath, or abdominal pain. I will see the patient back in 1 year or sooner should the need arise. She was instructed to call with any questions or concerns prior to this time. All questions were answered today.

## 2025-03-19 PROBLEM — Z08 ENCOUNTER FOR FOLLOW-UP EXAMINATION AFTER COMPLETED TREATMENT FOR MALIGNANT NEOPLASM: Status: RESOLVED | Noted: 2025-02-17 | Resolved: 2025-03-19

## 2025-03-20 ENCOUNTER — OFFICE VISIT (OUTPATIENT)
Dept: HEMATOLOGY ONCOLOGY | Facility: CLINIC | Age: 70
End: 2025-03-20
Payer: COMMERCIAL

## 2025-03-20 VITALS
RESPIRATION RATE: 18 BRPM | WEIGHT: 176 LBS | TEMPERATURE: 97.6 F | OXYGEN SATURATION: 99 % | HEART RATE: 79 BPM | BODY MASS INDEX: 30.05 KG/M2 | HEIGHT: 64 IN | SYSTOLIC BLOOD PRESSURE: 144 MMHG | DIASTOLIC BLOOD PRESSURE: 82 MMHG

## 2025-03-20 DIAGNOSIS — E11.59 HYPERTENSION ASSOCIATED WITH DIABETES  (HCC): ICD-10-CM

## 2025-03-20 DIAGNOSIS — C50.412 MALIGNANT NEOPLASM OF UPPER-OUTER QUADRANT OF LEFT BREAST IN FEMALE, ESTROGEN RECEPTOR POSITIVE (HCC): Primary | ICD-10-CM

## 2025-03-20 DIAGNOSIS — I15.2 HYPERTENSION ASSOCIATED WITH DIABETES  (HCC): ICD-10-CM

## 2025-03-20 DIAGNOSIS — Z17.0 MALIGNANT NEOPLASM OF UPPER-OUTER QUADRANT OF LEFT BREAST IN FEMALE, ESTROGEN RECEPTOR POSITIVE (HCC): Primary | ICD-10-CM

## 2025-03-20 DIAGNOSIS — E10.9 TYPE 1 DIABETES MELLITUS WITHOUT COMPLICATION (HCC): ICD-10-CM

## 2025-03-20 PROCEDURE — G2211 COMPLEX E/M VISIT ADD ON: HCPCS | Performed by: INTERNAL MEDICINE

## 2025-03-20 PROCEDURE — 99215 OFFICE O/P EST HI 40 MIN: CPT | Performed by: INTERNAL MEDICINE

## 2025-03-20 NOTE — ASSESSMENT & PLAN NOTE
On losartan.  Management as per PCP.  Lab Results   Component Value Date    HGBA1C 7.3 (H) 10/09/2024

## 2025-03-20 NOTE — ASSESSMENT & PLAN NOTE
Management as per endocrinology  Lab Results   Component Value Date    HGBA1C 7.3 (H) 10/09/2024

## 2025-03-20 NOTE — ASSESSMENT & PLAN NOTE
Cancer Staging   Malignant neoplasm of upper-outer quadrant of left breast in female, estrogen receptor positive (HCC)  Staging form: Breast, AJCC 8th Edition  - Pathologic stage from 1/11/2024: ypT1c, pN0, cM0, ER+, [place]+, HER2- - Signed by Leon Ricks MD on 1/11/2024    Initial diagnosis and 2019.  S/p lumpectomy with Dr. Hoffmann on 2/28/2020.  Started anastrozole 1 mg daily in April 2020.  Underwent radiation from 5/11/2020 - 6/8/2020.    Patient has been on Arimidex for almost 5 years.  With her node-negative disease I believe she can stop the Arimidex after she has completed her current prescription (patient states she has approximately 60 pills left).  Initial plan was also to complete 5 years of adjuvant endocrine treatment.  Patient has been taking calcium and vitamin D supplements.  Continue yearly mammograms.

## 2025-03-20 NOTE — PROGRESS NOTES
Name: Shelley Driscoll      : 1955      MRN: [id number]  Encounter Provider: Deshaun Mckeon MD  Encounter Date: 3/20/2025   Encounter department: [facility] [place]  :  Assessment & Plan  Malignant neoplasm of upper-outer quadrant of left breast in female, estrogen receptor positive (HCC)   Cancer Staging   Malignant neoplasm of upper-outer quadrant of left breast in female, estrogen receptor positive (HCC)  Staging form: Breast, AJCC 8th Edition  - Pathologic stage from 2024: ypT1c, pN0, cM0, ER+, SD+, HER2- - Signed by Leon Ricks MD on 2024    Initial diagnosis and 2019.  S/p lumpectomy with Dr. Hoffmann on 2020.  Started anastrozole 1 mg daily in 2020.  Underwent radiation from 2020 - 2020.    Patient has been on Arimidex for almost 5 years.  With her node-negative disease I believe she can stop the Arimidex after she has completed her current prescription (patient states she has approximately 60 pills left).  Initial plan was also to complete 5 years of adjuvant endocrine treatment.  Patient has been taking calcium and vitamin D supplements.  Continue yearly mammograms.           Hypertension associated with diabetes  (HCC)  On losartan.  Management as per PCP.  Lab Results   Component Value Date    HGBA1C 7.3 (H) 10/09/2024            Type 1 diabetes mellitus without complication (HCC)  Management as per endocrinology  Lab Results   Component Value Date    HGBA1C 7.3 (H) 10/09/2024                Return in about 6 months (around 2025) for Office Visit.    History of Present Illness   Chief Complaint   Patient presents with    Follow-up   HPI: 70-year-old postmenopausal woman who was found to have abnormality in her left breast, based on a screening mammography.  In 2019, left breast biopsy showed invasive ductal carcinoma, grade 1. This was % positive, SD 90% positive, HER2 negative disease.  She has strong  family history of breast cancer in her mother as well as sister.  Therefore, she underwent genetic testing which was negative for BRCA gene mutation.  She subsequently underwent lumpectomy and sentinel lymph node biopsy by Dr. Hoffmann.  Lumpectomy showed 1.5 cm of invasive ductal carcinoma, grade 1. There was no evidence of lymphovascular invasion.  1 sentinel lymph node was negative for metastatic disease.  This is the initial medical oncology consultation virtually to discuss adjuvant therapy.  She feels well.  She has no breast related symptomatology.  Her weight is stable.  She has no respiratory symptoms.  She has well-controlled diabetes and hypertension.  She her mother was diagnosed with breast cancer in her early 40s.  Her sister was diagnosed with breast cancer at age of 62. She is a lifetime never smoker.     Interval History:  Presents today as a transfer care from Dr. Ricks and for follow-up of breast cancer.  She has been tolerating anastrozole fairly well.  She has been on anastrozole since April 2020 and the plan was to continue anastrozole for 5 years.  She is now at the 5 years soco.  Denies any hot flashes or night sweats.  Continues to take calcium and vitamin D supplements.  Developed cellulitis of the left breast in January 2025 for which she was treated with antibiotics however that caused the patient to develop anxiety.  Patient has been started on Zoloft for her anxiety/depression by her PCP.    Oncology History   Cancer Staging   Malignant neoplasm of upper-outer quadrant of left breast in female, estrogen receptor positive (HCC)  Staging form: Breast, AJCC 8th Edition  - Pathologic stage from 1/11/2024: ypT1c, pN0, cM0, ER+, MN+, HER2- - Signed by Leon Ricks MD on 1/11/2024  Histopathologic type: Infiltrating ductular carcinoma  Stage prefix: Post-therapy  Nuclear grade: G1  Multigene prognostic tests performed: MammaPrint  Oncology History   Malignant neoplasm of upper-outer quadrant of  "left breast in female, estrogen receptor positive (HCC)   12/23/2019 Biopsy    Left breast ultrasound-guided biopsy  2 o'clock, 10 cm from nipple  Invasive mammary carcinoma of no special type  Grade 1    LA 90  HER2 1+    Concordant. Appears unifocal - difficulty assessing accurate size on mammo vs US. 2.1 cm vs 1.1 cm; possible concern for adjacent mass on original US not able to be visualized on biopsy. Left axilla US negative. Right breast clear.     1/17/2020 Genetic Testing    The following genes were evaluated: ALEKSANDAR, BARD1, BRCA1, BRCA2, BRIP1, CDH1, CHEK2, NBN, NF1, PALB2, PTEN, RAD50, STK11, TP53  Negative result. No pathogenic sequence variants or deletions/dupllications identified  [employer]     2/28/2020 Surgery    Left breast needle localized lumpectomy with sentinel lymph node biopsy  Invasive carcinoma of no special type  Grade 1  1.5 cm  Margins negative  0/1 Lymph node  Anatomic/Prognostic Stage IA     4/22/2020 -  Hormone Therapy    Anastrozole 1 mg daily  Dr. Girard     5/11/2020 - 6/8/2020 Radiation    entire left breast to 4005cGy in 15 daily 267cGy fractions followed by an additional  1000cGy in 5 daily fractions to the lumpectomy cavity.  Total dose to the lumpectomy cavity was 5005cGy. Total number of fractions: 20.      Dr Davis     1/11/2024 -  Cancer Staged    Staging form: Breast, AJCC 8th Edition  - Pathologic stage from 1/11/2024: ypT1c, pN0, cM0, ER+, LA+, HER2- - Signed by Leon Ricks MD on 1/11/2024  Histopathologic type: Infiltrating ductular carcinoma  Stage prefix: Post-therapy  Nuclear grade: G1  Multigene prognostic tests performed: MammaPrint          Pertinent Medical History   03/20/25: reviewed.      Review of Systems  14 point review of systems was negative except that mentioned in HPI.        Objective   /82 (BP Location: Right arm, Patient Position: Sitting, Cuff Size: Adult)   Pulse 79   Temp 97.6 °F (36.4 °C) (Temporal)   Resp 18   Ht 5' 4\" (1.626 m)   " Wt 79.8 kg (176 lb)   LMP  (LMP Unknown)   SpO2 99%   BMI 30.21 kg/m²     Pain Screening:  Pain Score: 0-No pain  ECOG   1  Physical Exam  Vitals and nursing note reviewed.   Constitutional:       General: She is not in acute distress.     Appearance: Normal appearance.   HENT:      Head: Normocephalic and atraumatic.      Mouth/Throat:      Mouth: Mucous membranes are moist.      Pharynx: Oropharynx is clear.   Eyes:      General: No scleral icterus.     Extraocular Movements: Extraocular movements intact.      Conjunctiva/sclera: Conjunctivae normal.   Cardiovascular:      Rate and Rhythm: Normal rate and regular rhythm.      Pulses: Normal pulses.      Heart sounds: No murmur heard.  Pulmonary:      Effort: Pulmonary effort is normal.      Breath sounds: Normal breath sounds. No wheezing, rhonchi or rales.   Abdominal:      General: Bowel sounds are normal.      Palpations: Abdomen is soft.      Tenderness: There is no abdominal tenderness.   Musculoskeletal:         General: No swelling or tenderness. Normal range of motion.      Cervical back: Neck supple.   Lymphadenopathy:      Cervical: No cervical adenopathy.   Skin:     General: Skin is warm and dry.      Coloration: Skin is not pale.      Findings: No bruising, erythema or rash.   Neurological:      General: No focal deficit present.      Mental Status: She is alert and oriented to person, place, and time.      Motor: No weakness.   Psychiatric:         Mood and Affect: Mood normal.         Behavior: Behavior normal.         Labs: I have reviewed the following labs:  Lab Results   Component Value Date/Time    WBC 5.37 01/06/2025 02:34 PM    RBC 4.62 01/06/2025 02:34 PM    Hemoglobin 13.4 01/06/2025 02:34 PM    Hematocrit 40.5 01/06/2025 02:34 PM    MCV 88 01/06/2025 02:34 PM    MCH 29.0 01/06/2025 02:34 PM    RDW 12.6 01/06/2025 02:34 PM    Platelets 138 (L) 01/06/2025 02:34 PM    Segmented % 67 01/06/2025 02:34 PM    Lymphocytes % 26 01/06/2025 02:34  PM    Monocytes % 7 01/06/2025 02:34 PM    Eosinophils Relative 0 01/06/2025 02:34 PM    Basophils Relative 0 01/06/2025 02:34 PM    Immature Grans % 0 01/06/2025 02:34 PM    Absolute Neutrophils 3.50 01/06/2025 02:34 PM     Lab Results   Component Value Date/Time    Potassium 3.6 01/06/2025 02:34 PM    Potassium 3.9 10/09/2024 08:55 AM    Chloride 106 01/06/2025 02:34 PM    Chloride 104 10/09/2024 08:55 AM    Carbon Dioxide 28 10/09/2024 08:55 AM    CO2 28 01/06/2025 02:34 PM    BUN 10 01/06/2025 02:34 PM    BUN 19 10/09/2024 08:55 AM    Creatinine 0.46 (L) 01/06/2025 02:34 PM    Creatinine 0.54 10/09/2024 08:55 AM    Calcium 10.6 (H) 01/06/2025 02:34 PM    Calcium 10.1 10/09/2024 08:55 AM    AST 17 01/06/2025 02:34 PM    AST 19 10/09/2024 08:55 AM    ALT 16 01/06/2025 02:34 PM    ALT 18 10/09/2024 08:55 AM    Alkaline Phosphatase 90 01/06/2025 02:34 PM    Alkaline Phosphatase 83 10/09/2024 08:55 AM    Total Protein 7.1 01/06/2025 02:34 PM    Protein, Total 7.0 10/09/2024 08:55 AM    Albumin 4.3 01/06/2025 02:34 PM    ALBUMIN 4.1 10/09/2024 08:55 AM    Total Bilirubin 0.43 01/06/2025 02:34 PM    Total Bilirubin 0.4 10/09/2024 08:55 AM    eGFRcr 98 10/09/2024 08:55 AM    eGFR 100 01/06/2025 02:34 PM             Disclaimer: This document was prepared using [employer] technology. If a word or phrase is confusing, or does not make sense, this is likely due to recognition error which was not discovered during this clinician's review. If you believe an error has occurred, please contact me through [employer] service line for [employer].      Follow Up    All questions were answered to the patient's satisfaction during this encounter. The patient knows the contact information for our office and knows to reach out for any relevant concerns related to this encounter. They are to call for any temperature 100.4 or higher, new symptoms including but not restricted to shaking chills, decreased appetite, nausea, vomiting,  diarrhea, increased fatigue, shortness of breath or chest pain, confusion, and not feeling the strength to come to the clinic. For all other listed problems and medical diagnosis in their chart - they are managed by PCP and/or other specialists, which the patient acknowledges.     I spent 43 minutes reviewing the records (labs, clinician notes, outside records, medical history, ordering medicine/tests/procedures, monitoring of anti-neoplastic toxicities, interpreting the imaging/labs previously done) and coordination of care as well as direct time with the patient today, of which greater than 50% of the time was spent in counseling and coordination of care with the patient/family.

## (undated) DEVICE — 3M™ STERI-STRIP™ REINFORCED ADHESIVE SKIN CLOSURES, R1547, 1/2 IN X 4 IN (12 MM X 100 MM), 6 STRIPS/ENVELOPE: Brand: 3M™ STERI-STRIP™

## (undated) DEVICE — NEEDLE 25G X 1 1/2

## (undated) DEVICE — LIGHT HANDLE COVER SLEEVE DISP BLUE STELLAR

## (undated) DEVICE — INTENDED FOR TISSUE SEPARATION, AND OTHER PROCEDURES THAT REQUIRE A SHARP SURGICAL BLADE TO PUNCTURE OR CUT.: Brand: BARD-PARKER SAFETY BLADES SIZE 15, STERILE

## (undated) DEVICE — CHLORAPREP HI-LITE 26ML ORANGE

## (undated) DEVICE — DRAPE EQUIPMENT RF WAND

## (undated) DEVICE — DRAPE PROBE NEO-PROBE/ULTRASOUND

## (undated) DEVICE — MEDI-VAC YANK SUCT HNDL W/TPRD BULBOUS TIP: Brand: CARDINAL HEALTH

## (undated) DEVICE — VIAL DECANTER

## (undated) DEVICE — BETHLEHEM UNIVERSAL MINOR GEN: Brand: CARDINAL HEALTH

## (undated) DEVICE — TUBING SUCTION 5MM X 12 FT

## (undated) DEVICE — SMOKE EVACUATION TUBING WITH 8 IN INTEGRAL WAND AND SPONGE GUARD: Brand: BUFFALO FILTER

## (undated) DEVICE — MARGIN MARKER SET

## (undated) DEVICE — SUT VICRYL 3-0 SH 27 IN J416H

## (undated) DEVICE — PENCIL ELECTROSURG E-Z CLEAN -0035H

## (undated) DEVICE — SUT MONOCRYL 4-0 PS-2 18 IN Y496G

## (undated) DEVICE — SYRINGE 1ML TB 26G X 3/8 SAFETY

## (undated) DEVICE — GLOVE SRG BIOGEL 7